# Patient Record
Sex: MALE | Race: BLACK OR AFRICAN AMERICAN | NOT HISPANIC OR LATINO | Employment: UNEMPLOYED | URBAN - METROPOLITAN AREA
[De-identification: names, ages, dates, MRNs, and addresses within clinical notes are randomized per-mention and may not be internally consistent; named-entity substitution may affect disease eponyms.]

---

## 2017-06-01 ENCOUNTER — HOSPITAL ENCOUNTER (INPATIENT)
Facility: HOSPITAL | Age: 32
LOS: 3 days | Discharge: HOME | End: 2017-06-04
Attending: EMERGENCY MEDICINE | Admitting: INTERNAL MEDICINE

## 2017-06-01 ENCOUNTER — APPOINTMENT (OUTPATIENT)
Dept: GENERAL RADIOLOGY | Facility: HOSPITAL | Age: 32
End: 2017-06-01

## 2017-06-01 DIAGNOSIS — J45.52 SEVERE PERSISTENT ASTHMA WITH STATUS ASTHMATICUS: Primary | ICD-10-CM

## 2017-06-01 LAB
ALBUMIN SERPL-MCNC: 4.2 G/DL (ref 3.5–5.2)
ALBUMIN/GLOB SERPL: 1.1 G/DL
ALP SERPL-CCNC: 61 U/L (ref 39–117)
ALT SERPL W P-5'-P-CCNC: 21 U/L (ref 1–41)
ANION GAP SERPL CALCULATED.3IONS-SCNC: 11.2 MMOL/L
ARTERIAL PATENCY WRIST A: POSITIVE
AST SERPL-CCNC: 22 U/L (ref 1–40)
ATMOSPHERIC PRESS: 750 MMHG
BASE EXCESS BLDA CALC-SCNC: 0.6 MMOL/L (ref 0–2)
BASOPHILS # BLD AUTO: 0.08 10*3/MM3 (ref 0–0.2)
BASOPHILS NFR BLD AUTO: 0.8 % (ref 0–1.5)
BDY SITE: ABNORMAL
BILIRUB SERPL-MCNC: 0.6 MG/DL (ref 0.1–1.2)
BUN BLD-MCNC: 10 MG/DL (ref 6–20)
BUN/CREAT SERPL: 8.6 (ref 7–25)
CALCIUM SPEC-SCNC: 9.2 MG/DL (ref 8.6–10.5)
CHLORIDE SERPL-SCNC: 98 MMOL/L (ref 98–107)
CO2 SERPL-SCNC: 27.8 MMOL/L (ref 22–29)
CREAT BLD-MCNC: 1.16 MG/DL (ref 0.76–1.27)
DEPRECATED RDW RBC AUTO: 45.3 FL (ref 37–54)
EOSINOPHIL # BLD AUTO: 1.84 10*3/MM3 (ref 0–0.7)
EOSINOPHIL NFR BLD AUTO: 17.6 % (ref 0.3–6.2)
ERYTHROCYTE [DISTWIDTH] IN BLOOD BY AUTOMATED COUNT: 14.1 % (ref 11.5–14.5)
GFR SERPL CREATININE-BSD FRML MDRD: 74 ML/MIN/1.73
GFR SERPL CREATININE-BSD FRML MDRD: 90 ML/MIN/1.73
GLOBULIN UR ELPH-MCNC: 3.9 GM/DL
GLUCOSE BLD-MCNC: 89 MG/DL (ref 65–99)
GLUCOSE BLDC GLUCOMTR-MCNC: 115 MG/DL (ref 70–130)
GLUCOSE BLDC GLUCOMTR-MCNC: 116 MG/DL (ref 70–130)
GLUCOSE BLDC GLUCOMTR-MCNC: 82 MG/DL (ref 70–130)
HCO3 BLDA-SCNC: 26.2 MMOL/L (ref 22–28)
HCT VFR BLD AUTO: 47.2 % (ref 40.4–52.2)
HGB BLD-MCNC: 16 G/DL (ref 13.7–17.6)
HOROWITZ INDEX BLD+IHG-RTO: 40 %
IMM GRANULOCYTES # BLD: 0.03 10*3/MM3 (ref 0–0.03)
IMM GRANULOCYTES NFR BLD: 0.3 % (ref 0–0.5)
INR PPP: 1.11 (ref 0.9–1.1)
LYMPHOCYTES # BLD AUTO: 3.73 10*3/MM3 (ref 0.9–4.8)
LYMPHOCYTES NFR BLD AUTO: 35.7 % (ref 19.6–45.3)
MCH RBC QN AUTO: 29.9 PG (ref 27–32.7)
MCHC RBC AUTO-ENTMCNC: 33.9 G/DL (ref 32.6–36.4)
MCV RBC AUTO: 88.2 FL (ref 79.8–96.2)
MODALITY: ABNORMAL
MONOCYTES # BLD AUTO: 1.38 10*3/MM3 (ref 0.2–1.2)
MONOCYTES NFR BLD AUTO: 13.2 % (ref 5–12)
NEUTROPHILS # BLD AUTO: 3.38 10*3/MM3 (ref 1.9–8.1)
NEUTROPHILS NFR BLD AUTO: 32.4 % (ref 42.7–76)
NT-PROBNP SERPL-MCNC: 29.7 PG/ML (ref 5–450)
O2 A-A PPRESDIFF RESPIRATORY: 0.5 MMHG
PCO2 BLDA: 44.1 MM HG (ref 35–45)
PH BLDA: 7.38 PH UNITS (ref 7.35–7.45)
PLATELET # BLD AUTO: 274 10*3/MM3 (ref 140–500)
PMV BLD AUTO: 11 FL (ref 6–12)
PO2 BLDA: 120.3 MM HG (ref 80–100)
POTASSIUM BLD-SCNC: 4.1 MMOL/L (ref 3.5–5.2)
PROT SERPL-MCNC: 8.1 G/DL (ref 6–8.5)
PROTHROMBIN TIME: 13.9 SECONDS (ref 11.7–14.2)
RBC # BLD AUTO: 5.35 10*6/MM3 (ref 4.6–6)
SAO2 % BLDCOA: 98.6 % (ref 92–99)
SET MECH RESP RATE: 14
SODIUM BLD-SCNC: 137 MMOL/L (ref 136–145)
TOTAL RATE: 15 BREATHS/MINUTE
TROPONIN T SERPL-MCNC: <0.01 NG/ML (ref 0–0.03)
WBC NRBC COR # BLD: 10.44 10*3/MM3 (ref 4.5–10.7)

## 2017-06-01 PROCEDURE — 85610 PROTHROMBIN TIME: CPT | Performed by: EMERGENCY MEDICINE

## 2017-06-01 PROCEDURE — 94660 CPAP INITIATION&MGMT: CPT

## 2017-06-01 PROCEDURE — 99285 EMERGENCY DEPT VISIT HI MDM: CPT

## 2017-06-01 PROCEDURE — 94799 UNLISTED PULMONARY SVC/PX: CPT

## 2017-06-01 PROCEDURE — 25010000002 ENOXAPARIN PER 10 MG: Performed by: INTERNAL MEDICINE

## 2017-06-01 PROCEDURE — 93010 ELECTROCARDIOGRAM REPORT: CPT | Performed by: INTERNAL MEDICINE

## 2017-06-01 PROCEDURE — 82803 BLOOD GASES ANY COMBINATION: CPT

## 2017-06-01 PROCEDURE — 93005 ELECTROCARDIOGRAM TRACING: CPT | Performed by: EMERGENCY MEDICINE

## 2017-06-01 PROCEDURE — 36600 WITHDRAWAL OF ARTERIAL BLOOD: CPT

## 2017-06-01 PROCEDURE — 80053 COMPREHEN METABOLIC PANEL: CPT | Performed by: EMERGENCY MEDICINE

## 2017-06-01 PROCEDURE — 36415 COLL VENOUS BLD VENIPUNCTURE: CPT

## 2017-06-01 PROCEDURE — 25010000002 METHYLPREDNISOLONE PER 125 MG: Performed by: INTERNAL MEDICINE

## 2017-06-01 PROCEDURE — 71010 HC CHEST PA OR AP: CPT

## 2017-06-01 PROCEDURE — 25010000002 METHYLPREDNISOLONE PER 125 MG: Performed by: EMERGENCY MEDICINE

## 2017-06-01 PROCEDURE — 83880 ASSAY OF NATRIURETIC PEPTIDE: CPT | Performed by: EMERGENCY MEDICINE

## 2017-06-01 PROCEDURE — 85025 COMPLETE CBC W/AUTO DIFF WBC: CPT | Performed by: EMERGENCY MEDICINE

## 2017-06-01 PROCEDURE — 82962 GLUCOSE BLOOD TEST: CPT

## 2017-06-01 PROCEDURE — 84484 ASSAY OF TROPONIN QUANT: CPT | Performed by: EMERGENCY MEDICINE

## 2017-06-01 PROCEDURE — 25010000002 MAGNESIUM SULFATE 2 GM/50ML SOLUTION: Performed by: EMERGENCY MEDICINE

## 2017-06-01 RX ORDER — IPRATROPIUM BROMIDE AND ALBUTEROL SULFATE 2.5; .5 MG/3ML; MG/3ML
3 SOLUTION RESPIRATORY (INHALATION) EVERY 6 HOURS PRN
Status: DISCONTINUED | OUTPATIENT
Start: 2017-06-01 | End: 2017-06-01

## 2017-06-01 RX ORDER — IPRATROPIUM BROMIDE AND ALBUTEROL SULFATE 2.5; .5 MG/3ML; MG/3ML
3 SOLUTION RESPIRATORY (INHALATION)
Status: DISCONTINUED | OUTPATIENT
Start: 2017-06-01 | End: 2017-06-04 | Stop reason: HOSPADM

## 2017-06-01 RX ORDER — ONDANSETRON 4 MG/1
4 TABLET, FILM COATED ORAL EVERY 6 HOURS PRN
Status: DISCONTINUED | OUTPATIENT
Start: 2017-06-01 | End: 2017-06-04 | Stop reason: HOSPADM

## 2017-06-01 RX ORDER — SODIUM CHLORIDE 0.9 % (FLUSH) 0.9 %
10 SYRINGE (ML) INJECTION AS NEEDED
Status: DISCONTINUED | OUTPATIENT
Start: 2017-06-01 | End: 2017-06-03

## 2017-06-01 RX ORDER — ALBUTEROL SULFATE 2.5 MG/3ML
2.5 SOLUTION RESPIRATORY (INHALATION) AS NEEDED
Status: DISCONTINUED | OUTPATIENT
Start: 2017-06-01 | End: 2017-06-01

## 2017-06-01 RX ORDER — BUDESONIDE 0.5 MG/2ML
0.5 INHALANT ORAL
Status: DISCONTINUED | OUTPATIENT
Start: 2017-06-01 | End: 2017-06-01

## 2017-06-01 RX ORDER — IPRATROPIUM BROMIDE AND ALBUTEROL SULFATE 2.5; .5 MG/3ML; MG/3ML
3 SOLUTION RESPIRATORY (INHALATION) EVERY 4 HOURS PRN
Status: DISCONTINUED | OUTPATIENT
Start: 2017-06-01 | End: 2017-06-04 | Stop reason: HOSPADM

## 2017-06-01 RX ORDER — ONDANSETRON 4 MG/1
4 TABLET, ORALLY DISINTEGRATING ORAL EVERY 6 HOURS PRN
Status: DISCONTINUED | OUTPATIENT
Start: 2017-06-01 | End: 2017-06-04 | Stop reason: HOSPADM

## 2017-06-01 RX ORDER — CALCIUM CARBONATE 200(500)MG
2 TABLET,CHEWABLE ORAL 2 TIMES DAILY PRN
Status: DISCONTINUED | OUTPATIENT
Start: 2017-06-01 | End: 2017-06-04 | Stop reason: HOSPADM

## 2017-06-01 RX ORDER — ACETAMINOPHEN 650 MG/1
650 SUPPOSITORY RECTAL EVERY 4 HOURS PRN
Status: DISCONTINUED | OUTPATIENT
Start: 2017-06-01 | End: 2017-06-04 | Stop reason: HOSPADM

## 2017-06-01 RX ORDER — ONDANSETRON 2 MG/ML
4 INJECTION INTRAMUSCULAR; INTRAVENOUS EVERY 6 HOURS PRN
Status: DISCONTINUED | OUTPATIENT
Start: 2017-06-01 | End: 2017-06-04 | Stop reason: HOSPADM

## 2017-06-01 RX ORDER — IPRATROPIUM BROMIDE AND ALBUTEROL SULFATE 2.5; .5 MG/3ML; MG/3ML
3 SOLUTION RESPIRATORY (INHALATION) ONCE
Status: COMPLETED | OUTPATIENT
Start: 2017-06-01 | End: 2017-06-01

## 2017-06-01 RX ORDER — ALBUTEROL SULFATE 2.5 MG/3ML
2.5 SOLUTION RESPIRATORY (INHALATION)
Status: COMPLETED | OUTPATIENT
Start: 2017-06-01 | End: 2017-06-01

## 2017-06-01 RX ORDER — SODIUM CHLORIDE 0.9 % (FLUSH) 0.9 %
1-10 SYRINGE (ML) INJECTION AS NEEDED
Status: DISCONTINUED | OUTPATIENT
Start: 2017-06-01 | End: 2017-06-04 | Stop reason: HOSPADM

## 2017-06-01 RX ORDER — METHYLPREDNISOLONE SODIUM SUCCINATE 125 MG/2ML
60 INJECTION, POWDER, LYOPHILIZED, FOR SOLUTION INTRAMUSCULAR; INTRAVENOUS EVERY 6 HOURS
Status: DISCONTINUED | OUTPATIENT
Start: 2017-06-01 | End: 2017-06-01

## 2017-06-01 RX ORDER — MAGNESIUM HYDROXIDE/ALUMINUM HYDROXICE/SIMETHICONE 120; 1200; 1200 MG/30ML; MG/30ML; MG/30ML
30 SUSPENSION ORAL EVERY 6 HOURS PRN
Status: DISCONTINUED | OUTPATIENT
Start: 2017-06-01 | End: 2017-06-04 | Stop reason: HOSPADM

## 2017-06-01 RX ORDER — METHYLPREDNISOLONE SODIUM SUCCINATE 125 MG/2ML
60 INJECTION, POWDER, LYOPHILIZED, FOR SOLUTION INTRAMUSCULAR; INTRAVENOUS EVERY 6 HOURS
Status: DISCONTINUED | OUTPATIENT
Start: 2017-06-01 | End: 2017-06-02

## 2017-06-01 RX ORDER — ALBUTEROL SULFATE 2.5 MG/3ML
2.5 SOLUTION RESPIRATORY (INHALATION) ONCE
Status: COMPLETED | OUTPATIENT
Start: 2017-06-01 | End: 2017-06-01

## 2017-06-01 RX ORDER — ALBUTEROL SULFATE 2.5 MG/3ML
SOLUTION RESPIRATORY (INHALATION)
Status: COMPLETED
Start: 2017-06-01 | End: 2017-06-01

## 2017-06-01 RX ORDER — BISACODYL 10 MG
10 SUPPOSITORY, RECTAL RECTAL DAILY PRN
Status: DISCONTINUED | OUTPATIENT
Start: 2017-06-01 | End: 2017-06-04 | Stop reason: HOSPADM

## 2017-06-01 RX ORDER — ACETAMINOPHEN 325 MG/1
650 TABLET ORAL EVERY 4 HOURS PRN
Status: DISCONTINUED | OUTPATIENT
Start: 2017-06-01 | End: 2017-06-04 | Stop reason: HOSPADM

## 2017-06-01 RX ORDER — MAGNESIUM SULFATE HEPTAHYDRATE 40 MG/ML
2 INJECTION, SOLUTION INTRAVENOUS ONCE
Status: COMPLETED | OUTPATIENT
Start: 2017-06-01 | End: 2017-06-01

## 2017-06-01 RX ORDER — LISINOPRIL 5 MG/1
5 TABLET ORAL DAILY
COMMUNITY
End: 2022-11-13

## 2017-06-01 RX ORDER — BISACODYL 5 MG/1
5 TABLET, DELAYED RELEASE ORAL DAILY PRN
Status: DISCONTINUED | OUTPATIENT
Start: 2017-06-01 | End: 2017-06-04 | Stop reason: HOSPADM

## 2017-06-01 RX ORDER — BUDESONIDE 0.5 MG/2ML
0.5 INHALANT ORAL
Status: DISCONTINUED | OUTPATIENT
Start: 2017-06-01 | End: 2017-06-04 | Stop reason: HOSPADM

## 2017-06-01 RX ORDER — BUDESONIDE AND FORMOTEROL FUMARATE DIHYDRATE 160; 4.5 UG/1; UG/1
2 AEROSOL RESPIRATORY (INHALATION)
COMMUNITY
End: 2018-07-31 | Stop reason: HOSPADM

## 2017-06-01 RX ORDER — METHYLPREDNISOLONE SODIUM SUCCINATE 125 MG/2ML
125 INJECTION, POWDER, LYOPHILIZED, FOR SOLUTION INTRAMUSCULAR; INTRAVENOUS ONCE
Status: COMPLETED | OUTPATIENT
Start: 2017-06-01 | End: 2017-06-01

## 2017-06-01 RX ADMIN — IPRATROPIUM BROMIDE AND ALBUTEROL SULFATE 3 ML: .5; 3 SOLUTION RESPIRATORY (INHALATION) at 19:53

## 2017-06-01 RX ADMIN — ALBUTEROL SULFATE 2.5 MG: 2.5 SOLUTION RESPIRATORY (INHALATION) at 13:23

## 2017-06-01 RX ADMIN — METHYLPREDNISOLONE SODIUM SUCCINATE 125 MG: 125 INJECTION, POWDER, FOR SOLUTION INTRAMUSCULAR; INTRAVENOUS at 13:21

## 2017-06-01 RX ADMIN — Medication 10 ML: at 18:27

## 2017-06-01 RX ADMIN — ALBUTEROL SULFATE 2.5 MG: 2.5 SOLUTION RESPIRATORY (INHALATION) at 14:08

## 2017-06-01 RX ADMIN — ACETAMINOPHEN 650 MG: 325 TABLET ORAL at 20:16

## 2017-06-01 RX ADMIN — IPRATROPIUM BROMIDE AND ALBUTEROL SULFATE 3 ML: .5; 3 SOLUTION RESPIRATORY (INHALATION) at 17:07

## 2017-06-01 RX ADMIN — IPRATROPIUM BROMIDE AND ALBUTEROL SULFATE 3 ML: .5; 3 SOLUTION RESPIRATORY (INHALATION) at 13:23

## 2017-06-01 RX ADMIN — MAGNESIUM SULFATE HEPTAHYDRATE 2 G: 40 INJECTION, SOLUTION INTRAVENOUS at 13:22

## 2017-06-01 RX ADMIN — ENOXAPARIN SODIUM 40 MG: 40 INJECTION SUBCUTANEOUS at 14:54

## 2017-06-01 RX ADMIN — BUDESONIDE 0.5 MG: 0.5 INHALANT RESPIRATORY (INHALATION) at 19:53

## 2017-06-01 RX ADMIN — ALBUTEROL SULFATE 2.5 MG: 2.5 SOLUTION RESPIRATORY (INHALATION) at 13:24

## 2017-06-01 RX ADMIN — ALBUTEROL SULFATE 2.5 MG: 2.5 SOLUTION RESPIRATORY (INHALATION) at 14:23

## 2017-06-01 RX ADMIN — ALBUTEROL SULFATE 2.5 MG: 2.5 SOLUTION RESPIRATORY (INHALATION) at 14:38

## 2017-06-01 RX ADMIN — METHYLPREDNISOLONE SODIUM SUCCINATE 60 MG: 125 INJECTION, POWDER, FOR SOLUTION INTRAMUSCULAR; INTRAVENOUS at 18:27

## 2017-06-01 NOTE — H&P
[place] [facility]  HISTORY AND PHYSICAL   [facility]        Patient Identification:  Name: Craig Bolanos  Age: 31 y.o.  Sex: male  :  1985  MRN: [id number]                     Primary Care Physician: No Known Provider    Chief Complaint:  Shortness of breath with wheezing    History of Present Illness:   31-year-old Afro-American male with history of asthma presents with increasing shortness of breath that started earlier today.  Saturations 88% on nonrebreather per EMS.  He's had previous hospitalizations at [facility] with intubation ×3 in the past.  He's been out of his medications in the last several days but didn't get them filled and has been using his inhaled medicines much more frequently last 24 hours without much benefit.  Denies productive cough.  Complains of chest tightness.  Started on BiPAP in the emergency room for increased work of breathing.  Received bronchodilators continuous IV steroids.  Will require admission to the intensive care unit for further treatment of status asthmaticus.  If worsens further will likely require intubation and mechanical ventilation again.  Shortness of breath with wheezing and cough is described as severe.  Cough is nonproductive.  Chest pain is a squeezing like without radiation.    Past Medical History:  Past Medical History:   Diagnosis Date   • Asthma    • COPD (chronic obstructive pulmonary disease)    • Hypertension      Past Surgical History:  No past surgical history on file.   Home Meds:    (Not in a hospital admission)    Allergies:  No Known Allergies  Immunizations:    There is no immunization history on file for this patient.  Social History:   Social History     Social History Narrative   • No narrative on file     Social History   Substance Use Topics   • Smoking status: Not on file   • Smokeless tobacco: Not on file   • Alcohol use Not on file     Family History:  No family history on file.     Review of Systems  Denies  "fevers or chills  Denies nausea or vomiting  No new vision or hearing changes  Positive chest tightness with shortness of breath and wheezing  No diarrhea, hematemesis or hematochezia, no dysuria or frequency  No musculoskeletal complaints  No heat or cold intolerance  No skin rashes  No dizziness or confusion.  No seizure activity  No new anxiety or depression  12 system review of systems performed and all else negative    Objective:  tMax 24 hrs: Temp (24hrs), Av.8 °F (37.1 °C), Min:98.8 °F (37.1 °C), Max:98.8 °F (37.1 °C)    Vitals Ranges:   Temp:  [98.8 °F (37.1 °C)] 98.8 °F (37.1 °C)  Heart Rate:  [123-140] 136  Resp:  [26-28] 28  BP: (129-149)/(100-109) 149/109      Exam:  BP (!) 149/109  Pulse (!) 136  Temp 98.8 °F (37.1 °C) (Tympanic)   Resp 28  Ht 68\" (172.7 cm)  Wt 140 lb (63.5 kg)  SpO2 95%  BMI 21.29 kg/m2    GENERAL APPEARANCE:   · Well developed  · Well nourished  · Moderate acute respiratory distress   EYES:    · PERRL                                                                           · Conjunctiva normal  · Sclera non-icteric.  HENT:   · Atraumatic, normocephalic  · External ears and nose normal  · Moist mucus membranes and no ulcers  NECK:  · Thyroid not enlarged  · Trachea midline   RESPIRATORY:    · labored breathing   · Diminished bilateral breath sounds  · No rales.  Diffuse bilateral wheezing  · No dullness  CARDIOVASCULAR:    · Tachycardic  · Normal S1, S2  · No murmur  · Lower extremity edema: none    GI:   · Bowel sounds normal  · Abdomen soft , non-distended, non-tender  · No abdominal masses.    MUSCULOSKELETAL:  · Normal movement of extremities  · No tenderness, no deformities  · No clubbing or cyanosis   Skin:    · No visible rashes  · No palpable nodules  PSYCHIATRIC:  · Speech and behavior appropriate  · Normal mood and affect  · Oriented to person, place and time  NEUROLOGIC:  .    Data Review:  Comprehensive Metabolic Panel [[id number]] Collected: 17 1318    "     Lab Status: Final result Specimen: Blood Updated: 06/01/17 1351        Glucose 89 mg/dL         BUN 10 mg/dL         Creatinine 1.16 mg/dL         Sodium 137 mmol/L         Potassium 4.1 mmol/L         Chloride 98 mmol/L         CO2 27.8 mmol/L         Calcium 9.2 mg/dL         Total Protein 8.1 g/dL         Albumin 4.20 g/dL         ALT (SGPT) 21 U/L         AST (SGOT) 22 U/L         Alkaline Phosphatase 61 U/L         Total Bilirubin 0.6 mg/dL         eGFR Non African Amer 74 mL/min/1.73         eGFR  African Amer 90 mL/min/1.73         Globulin 3.9 gm/dL         A/G Ratio 1.1 g/dL         BUN/Creatinine Ratio 8.6        Anion Gap 11.2 mmol/L        Protime-INR [[id number]] (Abnormal) Collected: 06/01/17 1318       Lab Status: Final result Specimen: Blood Updated: 06/01/17 1341        Protime 13.9 Seconds         INR 1.11 (H)       Troponin [[id number]] (Normal) Collected: 06/01/17 1318       Lab Status: Final result Specimen: Blood Updated: 06/01/17 1351        Troponin T <0.010 ng/mL        Narrative:         Troponin T Reference Ranges:  Less than 0.03 ng/mL:    Negative for AMI  0.03 to 0.09 ng/mL:      Indeterminant for AMI  Greater than 0.09 ng/mL: Positive for AMI       BNP [[id number]] (Normal) Collected: 06/01/17 1318       Lab Status: Final result Specimen: Blood Updated: 06/01/17 1349        proBNP 29.7 pg/mL        Narrative:         Among patients with dyspnea, NT-proBNP is highly sensitive for the detection of acute congestive heart failure. In addition NT-proBNP of <300 pg/ml effectively rules out acute congestive heart failure with 99% negative predictive value.       CBC Auto Differential [[id number]] (Abnormal) Collected: 06/01/17 1318       Lab Status: Final result Specimen: Blood Updated: 06/01/17 1332        WBC 10.44 10*3/mm3         RBC 5.35 10*6/mm3         Hemoglobin 16.0 g/dL         Hematocrit 47.2 %         MCV 88.2 fL         MCH 29.9 pg         MCHC 33.9 g/dL         RDW 14.1 %          RDW-SD 45.3 fl         MPV 11.0 fL         Platelets 274 10*3/mm3         Neutrophil % 32.4 (L) %         Lymphocyte % 35.7 %         Monocyte % 13.2 (H) %         Eosinophil % 17.6 (H) %         Basophil % 0.8 %         Immature Grans % 0.3 %         Neutrophils, Absolute 3.38 10*3/mm3         Lymphocytes, Absolute 3.73 10*3/mm3         Monocytes, Absolute 1.38 (H) 10*3/mm3         Eosinophils, Absolute 1.84 (H) 10*3/mm3         Basophils, Absolute 0.08 10*3/mm3         Immature Grans, Absolute 0.03 10*3/mm3               chest X-ray: No acute disease    Assessment:  Status asthmaticus  Acute hypoxemic respiratory failure secondary to above  History hypertension    Plan:  We'll require admission to the intensive care unit.  Continue scheduled bronchodilators.  Continue inhaled and IV steroids.  If worsens will require intubation.  On BiPAP for increased work of breathing.  Stat ABG has been ordered but results are not back yet.  ICU core measures    Cristian Andrea MD  [facility], [facility]  Pulmonary and Critical Care Medicine    6/1/2017  3:12 PM

## 2017-06-01 NOTE — ED PROVIDER NOTES
EMERGENCY DEPARTMENT ENCOUNTER    CHIEF COMPLAINT  Chief Complaint: SOA  History given by: patient, EMS  History limited by: pt cannot speak due to severity of SOA  Room Number: 12/12  PMD: No Known Provider      HPI:  Pt is a 31 y.o. male w/ hx of asthma who presents complaining of SOA onset earlier today. Per EMS pt was 88% on non-rebreather PTA post MN x 1. Pt denies any other complaints, medical problems or hx. Pt has been intubated previously and admitted to ICU x3. Pt is prescribed medications for hypertension but has not taken them recently.      Duration:  Onset earlier today  Onset: gradual  Timing: constant  Location: chest  Radiation: n/a  Quality: difficulty breathing  Intensity/Severity: severe  Progression: worsening  Associated Symptoms: none  Aggravating Factors: none  Alleviating Factors: none  Previous Episodes: hx of asthma  Treatment before arrival: multiple albuterol including 1 per EMS    PAST MEDICAL HISTORY  Active Ambulatory Problems     Diagnosis Date Noted   • No Active Ambulatory Problems     Resolved Ambulatory Problems     Diagnosis Date Noted   • No Resolved Ambulatory Problems     Past Medical History:   Diagnosis Date   • Asthma    • COPD (chronic obstructive pulmonary disease)    • Hypertension        PAST SURGICAL HISTORY  No past surgical history on file.    FAMILY HISTORY  No family history on file.    SOCIAL HISTORY  Social History     Social History   • Marital status: Single     Spouse name: N/A   • Number of children: N/A   • Years of education: N/A     Occupational History   • Not on file.     Social History Main Topics   • Smoking status: Not on file   • Smokeless tobacco: Not on file   • Alcohol use Not on file   • Drug use: Not on file   • Sexual activity: Not on file     Other Topics Concern   • Not on file     Social History Narrative   • No narrative on file       ALLERGIES  Review of patient's allergies indicates no known allergies.    REVIEW OF SYSTEMS  Review of  Systems   Unable to perform ROS: Acuity of condition   Respiratory: Positive for shortness of breath.        PHYSICAL EXAM  ED Triage Vitals   Temp Heart Rate Resp BP SpO2   -- 06/01/17 1315 06/01/17 1315 -- 06/01/17 1316    123 26  100 %      Temp src Heart Rate Source Patient Position BP Location FiO2 (%)   -- 06/01/17 1315 -- -- --    Monitor          Physical Exam   Constitutional: He is oriented to person, place, and time. He appears distressed (severely).   HENT:   Head: Normocephalic and atraumatic.   Eyes: EOM are normal.   Neck: Normal range of motion.   Cardiovascular: Regular rhythm, normal heart sounds and intact distal pulses.  Tachycardia present.    No murmur heard.  Pulses:       Posterior tibial pulses are 2+ on the right side, and 2+ on the left side.   Pulmonary/Chest: Accessory muscle usage present. Tachypnea noted. He is in respiratory distress (severe). He has decreased breath sounds. He has wheezes. He has rhonchi. He has no rales.   Poor air movement heard bilaterally.    Abdominal: Soft. Bowel sounds are normal. There is no tenderness.   Musculoskeletal: Normal range of motion. He exhibits no edema.   Neurological: He is alert and oriented to person, place, and time.   Skin: Skin is warm and dry.   Psychiatric: Affect normal.   Nursing note and vitals reviewed.      LAB RESULTS  Lab Results (last 24 hours)     Procedure Component Value Units Date/Time    Comprehensive Metabolic Panel [[id number]] Collected:  06/01/17 1318    Specimen:  Blood Updated:  06/01/17 1351     Glucose 89 mg/dL      BUN 10 mg/dL      Creatinine 1.16 mg/dL      Sodium 137 mmol/L      Potassium 4.1 mmol/L      Chloride 98 mmol/L      CO2 27.8 mmol/L      Calcium 9.2 mg/dL      Total Protein 8.1 g/dL      Albumin 4.20 g/dL      ALT (SGPT) 21 U/L      AST (SGOT) 22 U/L      Alkaline Phosphatase 61 U/L      Total Bilirubin 0.6 mg/dL      eGFR Non African Amer 74 mL/min/1.73      eGFR  African Amer 90 mL/min/1.73       Globulin 3.9 gm/dL      A/G Ratio 1.1 g/dL      BUN/Creatinine Ratio 8.6     Anion Gap 11.2 mmol/L     CBC & Differential [[id number]] Collected:  06/01/17 1318    Specimen:  Blood Updated:  06/01/17 1332    Narrative:       The following orders were created for panel order CBC & Differential.  Procedure                               Abnormality         Status                     ---------                               -----------         ------                     CBC Auto Differential[[id number]]        Abnormal            Final result                 Please view results for these tests on the individual orders.    Protime-INR [[id number]]  (Abnormal) Collected:  06/01/17 1318    Specimen:  Blood Updated:  06/01/17 1341     Protime 13.9 Seconds      INR 1.11 (H)    Troponin [[id number]]  (Normal) Collected:  06/01/17 1318    Specimen:  Blood Updated:  06/01/17 1351     Troponin T <0.010 ng/mL     Narrative:       Troponin T Reference Ranges:  Less than 0.03 ng/mL:    Negative for AMI  0.03 to 0.09 ng/mL:      Indeterminant for AMI  Greater than 0.09 ng/mL: Positive for AMI    BNP [[id number]]  (Normal) Collected:  06/01/17 1318    Specimen:  Blood Updated:  06/01/17 1349     proBNP 29.7 pg/mL     Narrative:       Among patients with dyspnea, NT-proBNP is highly sensitive for the detection of acute congestive heart failure. In addition NT-proBNP of <300 pg/ml effectively rules out acute congestive heart failure with 99% negative predictive value.    CBC Auto Differential [[id number]]  (Abnormal) Collected:  06/01/17 1318    Specimen:  Blood Updated:  06/01/17 1332     WBC 10.44 10*3/mm3      RBC 5.35 10*6/mm3      Hemoglobin 16.0 g/dL      Hematocrit 47.2 %      MCV 88.2 fL      MCH 29.9 pg      MCHC 33.9 g/dL      RDW 14.1 %      RDW-SD 45.3 fl      MPV 11.0 fL      Platelets 274 10*3/mm3      Neutrophil % 32.4 (L) %      Lymphocyte % 35.7 %      Monocyte % 13.2 (H) %      Eosinophil % 17.6 (H) %      Basophil % 0.8 %       Immature Grans % 0.3 %      Neutrophils, Absolute 3.38 10*3/mm3      Lymphocytes, Absolute 3.73 10*3/mm3      Monocytes, Absolute 1.38 (H) 10*3/mm3      Eosinophils, Absolute 1.84 (H) 10*3/mm3      Basophils, Absolute 0.08 10*3/mm3      Immature Grans, Absolute 0.03 10*3/mm3         I ordered the above labs and reviewed the results      RADIOLOGY  XR Chest 1 View   Final Result   The lungs are well-expanded and clear and the heart and hilar structures  are normal. There is no acute disease.     I ordered the above noted radiological studies. Interpreted by radiologist. Reviewed by me in PACS.       PROCEDURES  Critical Care  Performed by: TEVIN WONG  Authorized by: TEVIN WONG   Total critical care time: 35 minutes  Critical care time was exclusive of separately billable procedures and treating other patients.  Critical care was necessary to treat or prevent imminent or life-threatening deterioration of the following conditions: respiratory failure.  Critical care was time spent personally by me on the following activities: discussions with consultants, evaluation of patient's response to treatment, obtaining history from patient or surrogate, ordering and review of laboratory studies, pulse oximetry, review of old charts, development of treatment plan with patient or surrogate, examination of patient, ordering and performing treatments and interventions, ordering and review of radiographic studies and re-evaluation of patient's condition.      EKG           EKG time: 1335  Rhythm/Rate: Sinus tach rate in 130s  P waves and SC: normal  QRS, axis: normal   ST and T waves: normal     Interpreted Contemporaneously by me, independently viewed  No old for comparison      PROGRESS AND CONSULTS  ED Course     1309  Ordered blood work, PT-INR, troponin, BNP for further evaluation.    1312  Pt is 98% on non-rebreather.    1314  Ordered Duo-Neb, albuterol, Solu-medrol for SOA.    1317  No pneumothorax seen on  CXR.    1337  Rechecked pt who is more comfortable and able to speak a few sentences. Pt states he feels that he needs to cough but is unable to and that he has family en route. Pt understands and agrees with the plan. All questions answered.    1401  Rechecked pt who is c/o increased SOA. D/w pt plan to give more breathing tx and admit pt to ICU. Pt able to speak in short sentences and requests MD to call his mother. Called pt's mother who states family can be at the hospital w/in the next 30 minutes.   O2 96 % non-rebreather, HR 130s, /109  Ordered repeat albuterol. Placed call to admit pt to CCU.    1412  Call returned from Dr. Andrea, pulmonology, who will admit pt to CCU and recommends putting pt on BiPAP.  Rechecked pt who states he has not had a BiPAP before. D/w pt plan to BiPAP pt. Pt understands and agrees with the plan. All questions answered.  Ordered BiPAP for SOA.    1431  BP: 129/100 HR: (!) 136 Temp: 98.8 °F (37.1 °C) (Tympanic) O2 sat: 95%  Rechecked pt who appears to be more comfortable. Spoke w/ RT who are on the way w/ BiPAP.    1502  Rechecked pt who appears more comfortable on BiPAP.  BP: (!) 132/96  HR: (!) 120 Temp: 98.8 °F (37.1 °C) (Tympanic) O2 sat: 95%      MEDICAL DECISION MAKING  Results were reviewed/discussed with the patient and they were also made aware of online access. Pt also made aware that some labs, such as cultures, will not be resulted during ER visit and follow up with PMD is necessary.     MDM  Number of Diagnoses or Management Options  Severe persistent asthma with status asthmaticus:      Amount and/or Complexity of Data Reviewed  Clinical lab tests: ordered and reviewed (Lab work is unremarkable.)  Tests in the radiology section of CPT®: ordered and reviewed (CXR is negative acute.)  Tests in the medicine section of CPT®: reviewed and ordered (See EKG procedure note.)  Decide to obtain previous medical records or to obtain history from someone other than the  patient: yes  Obtain history from someone other than the patient: yes  Review and summarize past medical records: yes  Discuss the patient with other providers: yes (Dr. Andrea, pulmonology)  Independent visualization of images, tracings, or specimens: yes    Critical Care  Total time providing critical care: 30-74 minutes    Patient Progress  Patient progress: stable         DIAGNOSIS  Final diagnoses:   Severe persistent asthma with status asthmaticus       DISPOSITION  ADMISSION TO CCU    Discussed treatment plan and reason for admission with pt/family and admitting physician.  Pt/family voiced understanding of the plan for admission for further testing/treatment as needed.       Latest Documented Vital Signs:  As of 3:20 PM  BP- 132/96 HR- 86 Temp- 98.8 °F (37.1 °C) (Tympanic) O2 sat- 95%    --  Documentation assistance provided by mike Walker for Dr. Oconnell.  Information recorded by the scribe was done at my direction and has been verified and validated by me.     Abdulaziz Walker  06/01/17 1520       Sofia Oconnell MD  06/02/17 0103

## 2017-06-01 NOTE — PLAN OF CARE
Problem: Patient Care Overview (Adult)  Goal: Plan of Care Review  Outcome: Ongoing (interventions implemented as appropriate)    06/01/17 1646   Coping/Psychosocial Response Interventions   Plan Of Care Reviewed With patient;mother   Patient Care Overview   Progress improving   Outcome Evaluation   Outcome Summary/Follow up Plan Admitted from ER for asthma exacerbation. Patient tolerated transfer on 2 liters NC, however patient placed back on bipap d/t c/o increased SOA and chest tightness. Symptoms improved once placed back on Bipap. No c/o pain. VSS. No skin issues. NPO until tolerate BiPAP being off for extended period of time. Continue to monitor.       Goal: Adult Individualization and Mutuality  Outcome: Ongoing (interventions implemented as appropriate)  Goal: Discharge Needs Assessment  Outcome: Ongoing (interventions implemented as appropriate)    Problem: Activity Intolerance (Adult)  Goal: Identify Related Risk Factors and Signs and Symptoms  Outcome: Outcome(s) achieved Date Met:  06/01/17 06/01/17 1646   Activity Intolerance   Activity Intolerance: Related Risk Factors impaired pulmonary function;O2 supply/demand imbalance   Signs and Symptoms (Activity Intolerance) dyspnea/shortness of breath       Goal: Activity Tolerance  Outcome: Ongoing (interventions implemented as appropriate)    06/01/17 1646   Activity Intolerance (Adult)   Activity Tolerance making progress toward outcome       Goal: Effective Energy Conservation Techniques  Outcome: Ongoing (interventions implemented as appropriate)    06/01/17 1646   Activity Intolerance (Adult)   Effective Energy Conservation Techniques making progress toward outcome         Problem: Anxiety (Adult)  Goal: Identify Related Risk Factors and Signs and Symptoms  Outcome: Outcome(s) achieved Date Met:  06/01/17 06/01/17 1646   Anxiety   Related Risk Factors (Anxiety) perceived threat   Signs and Symptoms (Anxiety) perceived dangers/fears;sense of  impending doom       Goal: Reduction/Resolution  Outcome: Ongoing (interventions implemented as appropriate)    06/01/17 1646   Anxiety (Adult)   Reduction/Resolution making progress toward outcome         Problem: Respiratory Insufficiency (Adult)  Goal: Identify Related Risk Factors and Signs and Symptoms  Outcome: Outcome(s) achieved Date Met:  06/01/17 06/01/17 1646   Respiratory Insufficiency   Related Risk Factors (Respiratory Insufficiency) activity intolerance;other (see comments);physiological factors  (asthma)   Signs and Symptoms (Respiratory Insufficiency) abnormal breath sounds;blood pressure/heart rate changes;breathing pattern changes;decreased oxygen saturation;dyspnea;shortness of breath;respiratory rate alterations;use of accessory muscles       Goal: Acid/Base Balance  Outcome: Ongoing (interventions implemented as appropriate)    06/01/17 1646   Respiratory Insufficiency (Adult)   Acid/Base Balance making progress toward outcome       Goal: Effective Ventilation  Outcome: Ongoing (interventions implemented as appropriate)    06/01/17 1646   Respiratory Insufficiency (Adult)   Effective Ventilation making progress toward outcome

## 2017-06-02 LAB
ARTERIAL PATENCY WRIST A: POSITIVE
ATMOSPHERIC PRESS: 751 MMHG
BASE EXCESS BLDA CALC-SCNC: 2.3 MMOL/L (ref 0–2)
BDY SITE: ABNORMAL
GAS FLOW AIRWAY: 3 LPM
GLUCOSE BLDC GLUCOMTR-MCNC: 109 MG/DL (ref 70–130)
GLUCOSE BLDC GLUCOMTR-MCNC: 123 MG/DL (ref 70–130)
GLUCOSE BLDC GLUCOMTR-MCNC: 132 MG/DL (ref 70–130)
GLUCOSE BLDC GLUCOMTR-MCNC: 140 MG/DL (ref 70–130)
HCO3 BLDA-SCNC: 27.4 MMOL/L (ref 22–28)
MODALITY: ABNORMAL
PCO2 BLDA: 42.8 MM HG (ref 35–45)
PH BLDA: 7.41 PH UNITS (ref 7.35–7.45)
PO2 BLDA: 95.6 MM HG (ref 80–100)
SAO2 % BLDCOA: 97.5 % (ref 92–99)
TOTAL RATE: 20 BREATHS/MINUTE

## 2017-06-02 PROCEDURE — 25010000002 ENOXAPARIN PER 10 MG: Performed by: INTERNAL MEDICINE

## 2017-06-02 PROCEDURE — 36600 WITHDRAWAL OF ARTERIAL BLOOD: CPT

## 2017-06-02 PROCEDURE — 82962 GLUCOSE BLOOD TEST: CPT

## 2017-06-02 PROCEDURE — 94799 UNLISTED PULMONARY SVC/PX: CPT

## 2017-06-02 PROCEDURE — 25010000002 METHYLPREDNISOLONE PER 125 MG: Performed by: INTERNAL MEDICINE

## 2017-06-02 PROCEDURE — 82803 BLOOD GASES ANY COMBINATION: CPT

## 2017-06-02 RX ORDER — LISINOPRIL 5 MG/1
5 TABLET ORAL
Status: DISCONTINUED | OUTPATIENT
Start: 2017-06-02 | End: 2017-06-04 | Stop reason: HOSPADM

## 2017-06-02 RX ORDER — METHYLPREDNISOLONE SODIUM SUCCINATE 125 MG/2ML
60 INJECTION, POWDER, LYOPHILIZED, FOR SOLUTION INTRAMUSCULAR; INTRAVENOUS EVERY 12 HOURS
Status: DISCONTINUED | OUTPATIENT
Start: 2017-06-03 | End: 2017-06-03

## 2017-06-02 RX ADMIN — IPRATROPIUM BROMIDE AND ALBUTEROL SULFATE 3 ML: .5; 3 SOLUTION RESPIRATORY (INHALATION) at 07:53

## 2017-06-02 RX ADMIN — IPRATROPIUM BROMIDE AND ALBUTEROL SULFATE 3 ML: .5; 3 SOLUTION RESPIRATORY (INHALATION) at 23:18

## 2017-06-02 RX ADMIN — ENOXAPARIN SODIUM 40 MG: 40 INJECTION SUBCUTANEOUS at 08:27

## 2017-06-02 RX ADMIN — METHYLPREDNISOLONE SODIUM SUCCINATE 60 MG: 125 INJECTION, POWDER, FOR SOLUTION INTRAMUSCULAR; INTRAVENOUS at 23:42

## 2017-06-02 RX ADMIN — ACETAMINOPHEN 650 MG: 325 TABLET ORAL at 12:01

## 2017-06-02 RX ADMIN — METHYLPREDNISOLONE SODIUM SUCCINATE 60 MG: 125 INJECTION, POWDER, FOR SOLUTION INTRAMUSCULAR; INTRAVENOUS at 06:45

## 2017-06-02 RX ADMIN — IPRATROPIUM BROMIDE AND ALBUTEROL SULFATE 3 ML: .5; 3 SOLUTION RESPIRATORY (INHALATION) at 12:21

## 2017-06-02 RX ADMIN — LISINOPRIL 5 MG: 5 TABLET ORAL at 11:57

## 2017-06-02 RX ADMIN — BUDESONIDE 0.5 MG: 0.5 INHALANT RESPIRATORY (INHALATION) at 07:53

## 2017-06-02 RX ADMIN — IPRATROPIUM BROMIDE AND ALBUTEROL SULFATE 3 ML: .5; 3 SOLUTION RESPIRATORY (INHALATION) at 15:52

## 2017-06-02 RX ADMIN — METHYLPREDNISOLONE SODIUM SUCCINATE 60 MG: 125 INJECTION, POWDER, FOR SOLUTION INTRAMUSCULAR; INTRAVENOUS at 01:16

## 2017-06-02 RX ADMIN — IPRATROPIUM BROMIDE AND ALBUTEROL SULFATE 3 ML: .5; 3 SOLUTION RESPIRATORY (INHALATION) at 04:23

## 2017-06-02 RX ADMIN — BUDESONIDE 0.5 MG: 0.5 INHALANT RESPIRATORY (INHALATION) at 19:41

## 2017-06-02 RX ADMIN — IPRATROPIUM BROMIDE AND ALBUTEROL SULFATE 3 ML: .5; 3 SOLUTION RESPIRATORY (INHALATION) at 00:07

## 2017-06-02 RX ADMIN — IPRATROPIUM BROMIDE AND ALBUTEROL SULFATE 3 ML: .5; 3 SOLUTION RESPIRATORY (INHALATION) at 19:41

## 2017-06-02 RX ADMIN — METHYLPREDNISOLONE SODIUM SUCCINATE 60 MG: 125 INJECTION, POWDER, FOR SOLUTION INTRAMUSCULAR; INTRAVENOUS at 12:01

## 2017-06-02 NOTE — PAYOR COMM NOTE
"Alex Bolanos (31 y.o. Male)              ATTENTION; CRISSY GARZONS FOR YOUR REVIEW, PATIENT ADMITTED TO CORONARY CARE UNIT.          REPLY TO UR DEPT, BY  [phone]       Date of Birth Social Security Number Address Home Phone MRN    1985  [id number] [facility] [address] [phone] [id number]    [employer] Marital Status          None Single       Admission Date Admission Type Admitting Provider Attending Provider Department, Room/Bed    17 Emergency Cristian Andrea MD Esterle, Cristian Herron MD [facility] CORONARY CARE, /    Discharge Date Discharge Disposition Discharge Destination                      Attending Provider: Cristian Andrea MD     Allergies:  No Known Allergies    Isolation:  None   Infection:  None   Code Status:  FULL    Ht:  69\" (175.3 cm)   Wt:  135 lb 9.6 oz (61.5 kg)    Admission Cmt:  None   Principal Problem:  None                Active Insurance as of 2017     Primary Coverage     Payor Plan Insurance Group Employer/Plan Group    PASSPORT PASSPORT      Payor Plan Address Payor Plan Phone Number Effective From Effective To    [address] [phone] 1997     [place] [address]       Subscriber Name Subscriber Birth Date Member ID       ALEX BOLANOS 1985 [id number]                 Emergency Contacts      (Rel.) Home Phone Work Phone Mobile Phone    Ann Bolanos (Mother) [phone] -- --               History & Physical      Cristian Andrea MD at 2017  3:12 PM              [facility] PULMONARY C[facility]  HISTORY AND PHYSICAL   [facility]        Patient Identification:  Name: Alex Bolanos  Age: 31 y.o.  Sex: male  :  1985  MRN: [id number]                     Primary Care Physician: No Known Provider    Chief Complaint:  Shortness of breath with wheezing    History of Present Illness:   31-year-old Afro-American male with history of asthma presents with increasing " shortness of breath that started earlier today.  Saturations 88% on nonrebreather per EMS.  He's had previous hospitalizations at [facility] with intubation ×3 in the past.  He's been out of his medications in the last several days but didn't get them filled and has been using his inhaled medicines much more frequently last 24 hours without much benefit.  Denies productive cough.  Complains of chest tightness.  Started on BiPAP in the emergency room for increased work of breathing.  Received bronchodilators continuous IV steroids.  Will require admission to the intensive care unit for further treatment of status asthmaticus.  If worsens further will likely require intubation and mechanical ventilation again.  Shortness of breath with wheezing and cough is described as severe.  Cough is nonproductive.  Chest pain is a squeezing like without radiation.    Past Medical History:  Past Medical History:   Diagnosis Date   • Asthma    • COPD (chronic obstructive pulmonary disease)    • Hypertension      Past Surgical History:  No past surgical history on file.   Home Meds:    (Not in a hospital admission)    Allergies:  No Known Allergies  Immunizations:  Family History:  No family history on file.     Review of Systems  Denies fevers or chills  Denies nausea or vomiting  No new vision or hearing changes  Positive chest tightness with shortness of breath and wheezing  No diarrhea, hematemesis or hematochezia, no dysuria or frequency  No musculoskeletal complaints  No heat or cold intolerance  No skin rashes  No dizziness or confusion.  No seizure activity  No new anxiety or depression  12 system review of systems performed and all else negative    Objective:  tMax 24 hrs: Temp (24hrs), Av.8 °F (37.1 °C), Min:98.8 °F (37.1 °C), Max:98.8 °F (37.1 °C)    Vitals Ranges:   Temp:  [98.8 °F (37.1 °C)] 98.8 °F (37.1 °C)  Heart Rate:  [123-140] 136  Resp:  [26-28] 28  BP: (129-149)/(100-109) 149/109      Exam:  BP (!) 149/109   "Pulse (!) 136  Temp 98.8 °F (37.1 °C) (Tympanic)   Resp 28  Ht 68\" (172.7 cm)  Wt 140 lb (63.5 kg)  SpO2 95%  BMI 21.29 kg/m2    GENERAL APPEARANCE:   · Well developed  · Well nourished  · Moderate acute respiratory distress   EYES:    · PERRL                                                                           · Conjunctiva normal  · Sclera non-icteric.  HENT:   · Atraumatic, normocephalic  · External ears and nose normal  · Moist mucus membranes and no ulcers  NECK:  · Thyroid not enlarged  · Trachea midline   RESPIRATORY:    · labored breathing   · Diminished bilateral breath sounds  · No rales.  Diffuse bilateral wheezing  · No dullness  CARDIOVASCULAR:    · Tachycardic  · Normal S1, S2  · No murmur  · Lower extremity edema: none    GI:   · Bowel sounds normal  · Abdomen soft , non-distended, non-tender  · No abdominal masses.    MUSCULOSKELETAL:  · Normal movement of extremities  · No tenderness, no deformities  · No clubbing or cyanosis   Skin:    · No visible rashes  · No palpable nodules  PSYCHIATRIC:  · Speech and behavior appropriate  · Normal mood and affect  · Oriented to person, place and time  NEUROLOGIC:  .    Data Review:  Comprehensive Metabolic Panel [[id number]] Collected: 06/01/17 1318        Lab Status: Final result Specimen: Blood Updated: 06/01/17 1351        Glucose 89 mg/dL         BUN 10 mg/dL         Creatinine 1.16 mg/dL         Sodium 137 mmol/L         Potassium 4.1 mmol/L         Chloride 98 mmol/L         CO2 27.8 mmol/L         Calcium 9.2 mg/dL         Total Protein 8.1 g/dL         Albumin 4.20 g/dL         ALT (SGPT) 21 U/L         AST (SGOT) 22 U/L         Alkaline Phosphatase 61 U/L         Total Bilirubin 0.6 mg/dL         eGFR Non African Amer 74 mL/min/1.73         eGFR  African Amer 90 mL/min/1.73         Globulin 3.9 gm/dL         A/G Ratio 1.1 g/dL         BUN/Creatinine Ratio 8.6        Anion Gap 11.2 mmol/L        Protime-INR [[id number]] (Abnormal) Collected: " 06/01/17 1318       Lab Status: Final result Specimen: Blood Updated: 06/01/17 1341        Protime 13.9 Seconds         INR 1.11 (H)       Troponin [[id number]] (Normal) Collected: 06/01/17 1318       Lab Status: Final result Specimen: Blood Updated: 06/01/17 1351        Troponin T <0.010 ng/mL        Narrative:         Troponin T Reference Ranges:  Less than 0.03 ng/mL:    Negative for AMI  0.03 to 0.09 ng/mL:      Indeterminant for AMI  Greater than 0.09 ng/mL: Positive for AMI       BNP [[id number]] (Normal) Collected: 06/01/17 1318       Lab Status: Final result Specimen: Blood Updated: 06/01/17 1349        proBNP 29.7 pg/mL        Narrative:         Among patients with dyspnea, NT-proBNP is highly sensitive for the detection of acute congestive heart failure. In addition NT-proBNP of <300 pg/ml effectively rules out acute congestive heart failure with 99% negative predictive value.       CBC Auto Differential [[id number]] (Abnormal) Collected: 06/01/17 1318       Lab Status: Final result Specimen: Blood Updated: 06/01/17 1332        WBC 10.44 10*3/mm3         RBC 5.35 10*6/mm3         Hemoglobin 16.0 g/dL         Hematocrit 47.2 %         MCV 88.2 fL         MCH 29.9 pg         MCHC 33.9 g/dL         RDW 14.1 %         RDW-SD 45.3 fl         MPV 11.0 fL         Platelets 274 10*3/mm3         Neutrophil % 32.4 (L) %         Lymphocyte % 35.7 %         Monocyte % 13.2 (H) %         Eosinophil % 17.6 (H) %         Basophil % 0.8 %         Immature Grans % 0.3 %         Neutrophils, Absolute 3.38 10*3/mm3         Lymphocytes, Absolute 3.73 10*3/mm3         Monocytes, Absolute 1.38 (H) 10*3/mm3         Eosinophils, Absolute 1.84 (H) 10*3/mm3         Basophils, Absolute 0.08 10*3/mm3         Immature Grans, Absolute 0.03 10*3/mm3               chest X-ray: No acute disease    Assessment:  Status asthmaticus  Acute hypoxemic respiratory failure secondary to above  History hypertension    Plan:  We'll require admission to  the intensive care unit.  Continue scheduled bronchodilators.  Continue inhaled and IV steroids.  If worsens will require intubation.  On BiPAP for increased work of breathing.  Stat ABG has been ordered but results are not back yet.  ICU core measures    Cristian Andrea MD  [facility], [facility]  Pulmonary and Critical Care Medicine    6/1/2017  3:12 PM     Electronically signed by Cristian Andrea MD at 6/1/2017  3:17 PM           Emergency Department Notes      Sofia Oconnell MD at 6/1/2017  1:10 PM      Procedure Orders:    1. Critical Care [[id number]] ordered by Sofia Oconnell MD at 06/01/17 1406                 EMERGENCY DEPARTMENT ENCOUNTER    CHIEF COMPLAINT  Chief Complaint: SOA  History given by: patient, EMS  History limited by: pt cannot speak due to severity of SOA  Room Number: 12/12  PMD: No Known Provider      HPI:  Pt is a 31 y.o. male w/ hx of asthma who presents complaining of SOA onset earlier today. Per EMS pt was 88% on non-rebreather PTA post MN x 1. Pt denies any other complaints, medical problems or hx. Pt has been intubated previously and admitted to ICU x3. Pt is prescribed medications for hypertension but has not taken them recently.      Duration:  Onset earlier today  Onset: gradual  Timing: constant  Location: chest  Radiation: n/a  Quality: difficulty breathing  Intensity/Severity: severe  Progression: worsening  Associated Symptoms: none  Aggravating Factors: none  Alleviating Factors: none  Previous Episodes: hx of asthma  Treatment before arrival: multiple albuterol including 1 per EMS    PAST MEDICAL HISTORY  Active Ambulatory Problems     Diagnosis Date Noted   • No Active Ambulatory Problems     Resolved Ambulatory Problems     Diagnosis Date Noted   • No Resolved Ambulatory Problems     Past Medical History:   Diagnosis Date   • Asthma    • COPD (chronic obstructive pulmonary disease)    • Hypertension        PAST SURGICAL HISTORY  No past surgical history on  file.    FAMILY HISTORY  No family history on file.    SOCIAL HISTORY  Social History     Social History   • Marital status: Single     Spouse name: N/A   • Number of children: N/A   • Years of education: N/A     Occupational History   • Not on file.     Social History Main Topics   • Smoking status: Not on file   • Smokeless tobacco: Not on file   • Alcohol use Not on file   • Drug use: Not on file   • Sexual activity: Not on file     Other Topics Concern   • Not on file     Social History Narrative   • No narrative on file       ALLERGIES  Review of patient's allergies indicates no known allergies.    REVIEW OF SYSTEMS  Review of Systems   Unable to perform ROS: Acuity of condition   Respiratory: Positive for shortness of breath.        PHYSICAL EXAM  ED Triage Vitals   Temp Heart Rate Resp BP SpO2   -- 06/01/17 1315 06/01/17 1315 -- 06/01/17 1316    123 26  100 %      Temp src Heart Rate Source Patient Position BP Location FiO2 (%)   -- 06/01/17 1315 -- -- --    Monitor          Physical Exam   Constitutional: He is oriented to person, place, and time. He appears distressed (severely).   HENT:   Head: Normocephalic and atraumatic.   Eyes: EOM are normal.   Neck: Normal range of motion.   Cardiovascular: Regular rhythm, normal heart sounds and intact distal pulses.  Tachycardia present.    No murmur heard.  Pulses:       Posterior tibial pulses are 2+ on the right side, and 2+ on the left side.   Pulmonary/Chest: Accessory muscle usage present. Tachypnea noted. He is in respiratory distress (severe). He has decreased breath sounds. He has wheezes. He has rhonchi. He has no rales.   Poor air movement heard bilaterally.    Abdominal: Soft. Bowel sounds are normal. There is no tenderness.   Musculoskeletal: Normal range of motion. He exhibits no edema.   Neurological: He is alert and oriented to person, place, and time.   Skin: Skin is warm and dry.   Psychiatric: Affect normal.   Nursing note and vitals  reviewed.      LAB RESULTS  Lab Results (last 24 hours)     Procedure Component Value Units Date/Time    Comprehensive Metabolic Panel [[id number]] Collected:  06/01/17 1318    Specimen:  Blood Updated:  06/01/17 1351     Glucose 89 mg/dL      BUN 10 mg/dL      Creatinine 1.16 mg/dL      Sodium 137 mmol/L      Potassium 4.1 mmol/L      Chloride 98 mmol/L      CO2 27.8 mmol/L      Calcium 9.2 mg/dL      Total Protein 8.1 g/dL      Albumin 4.20 g/dL      ALT (SGPT) 21 U/L      AST (SGOT) 22 U/L      Alkaline Phosphatase 61 U/L      Total Bilirubin 0.6 mg/dL      eGFR Non African Amer 74 mL/min/1.73      eGFR  African Amer 90 mL/min/1.73      Globulin 3.9 gm/dL      A/G Ratio 1.1 g/dL      BUN/Creatinine Ratio 8.6     Anion Gap 11.2 mmol/L     CBC & Differential [[id number]] Collected:  06/01/17 1318    Specimen:  Blood Updated:  06/01/17 1332    Narrative:       The following orders were created for panel order CBC & Differential.  Procedure                               Abnormality         Status                     ---------                               -----------         ------                     CBC Auto Differential[[id number]]        Abnormal            Final result                 Please view results for these tests on the individual orders.    Protime-INR [[id number]]  (Abnormal) Collected:  06/01/17 1318    Specimen:  Blood Updated:  06/01/17 1341     Protime 13.9 Seconds      INR 1.11 (H)    Troponin [[id number]]  (Normal) Collected:  06/01/17 1318    Specimen:  Blood Updated:  06/01/17 1351     Troponin T <0.010 ng/mL     Narrative:       Troponin T Reference Ranges:  Less than 0.03 ng/mL:    Negative for AMI  0.03 to 0.09 ng/mL:      Indeterminant for AMI  Greater than 0.09 ng/mL: Positive for AMI    BNP [[id number]]  (Normal) Collected:  06/01/17 1318    Specimen:  Blood Updated:  06/01/17 1349     proBNP 29.7 pg/mL     Narrative:       Among patients with dyspnea, NT-proBNP is highly sensitive for the  detection of acute congestive heart failure. In addition NT-proBNP of <300 pg/ml effectively rules out acute congestive heart failure with 99% negative predictive value.    CBC Auto Differential [[id number]]  (Abnormal) Collected:  06/01/17 1318    Specimen:  Blood Updated:  06/01/17 1332     WBC 10.44 10*3/mm3      RBC 5.35 10*6/mm3      Hemoglobin 16.0 g/dL      Hematocrit 47.2 %      MCV 88.2 fL      MCH 29.9 pg      MCHC 33.9 g/dL      RDW 14.1 %      RDW-SD 45.3 fl      MPV 11.0 fL      Platelets 274 10*3/mm3      Neutrophil % 32.4 (L) %      Lymphocyte % 35.7 %      Monocyte % 13.2 (H) %      Eosinophil % 17.6 (H) %      Basophil % 0.8 %      Immature Grans % 0.3 %      Neutrophils, Absolute 3.38 10*3/mm3      Lymphocytes, Absolute 3.73 10*3/mm3      Monocytes, Absolute 1.38 (H) 10*3/mm3      Eosinophils, Absolute 1.84 (H) 10*3/mm3      Basophils, Absolute 0.08 10*3/mm3      Immature Grans, Absolute 0.03 10*3/mm3         I ordered the above labs and reviewed the results      RADIOLOGY  XR Chest 1 View   Final Result   The lungs are well-expanded and clear and the heart and hilar structures  are normal. There is no acute disease.     I ordered the above noted radiological studies. Interpreted by radiologist. Reviewed by me in PACS.       PROCEDURES  Critical Care  Performed by: TEVIN WONG  Authorized by: TEVIN WONG   Total critical care time: 35 minutes  Critical care time was exclusive of separately billable procedures and treating other patients.  Critical care was necessary to treat or prevent imminent or life-threatening deterioration of the following conditions: respiratory failure.  Critical care was time spent personally by me on the following activities: discussions with consultants, evaluation of patient's response to treatment, obtaining history from patient or surrogate, ordering and review of laboratory studies, pulse oximetry, review of old charts, development of treatment plan with patient or  surrogate, examination of patient, ordering and performing treatments and interventions, ordering and review of radiographic studies and re-evaluation of patient's condition.      EKG           EKG time: 1335  Rhythm/Rate: Sinus tach rate in 130s  P waves and [place]: normal  QRS, axis: normal   ST and T waves: normal     Interpreted Contemporaneously by me, independently viewed  No old for comparison      PROGRESS AND CONSULTS  ED Course     1309  Ordered blood work, PT-INR, troponin, BNP for further evaluation.    1312  Pt is 98% on non-rebreather.    1314  Ordered Duo-Neb, albuterol, Solu-medrol for SOA.    1317  No pneumothorax seen on CXR.    1337  Rechecked pt who is more comfortable and able to speak a few sentences. Pt states he feels that he needs to cough but is unable to and that he has family en route. Pt understands and agrees with the plan. All questions answered.    1401  Rechecked pt who is c/o increased SOA. D/w pt plan to give more breathing tx and admit pt to ICU. Pt able to speak in short sentences and requests MD to call his mother. Called pt's mother who states family can be at the hospital w/in the next 30 minutes.   O2 96 % non-rebreather, HR 130s, /109  Ordered repeat albuterol. Placed call to admit pt to CCU.    1412  Call returned from Dr. Andrea, pulmonology, who will admit pt to CCU and recommends putting pt on BiPAP.  Rechecked pt who states he has not had a BiPAP before. D/w pt plan to BiPAP pt. Pt understands and agrees with the plan. All questions answered.  Ordered BiPAP for SOA.    1431  BP: 129/100 HR: (!) 136 Temp: 98.8 °F (37.1 °C) (Tympanic) O2 sat: 95%  Rechecked pt who appears to be more comfortable. Spoke w/ RT who are on the way w/ BiPAP.    1502  Rechecked pt who appears more comfortable on BiPAP.  BP: (!) 132/96  HR: (!) 120 Temp: 98.8 °F (37.1 °C) (Tympanic) O2 sat: 95%      MEDICAL DECISION MAKING  Results were reviewed/discussed with the patient and they were also  made aware of online access. Pt also made aware that some labs, such as cultures, will not be resulted during ER visit and follow up with PMD is necessary.     MDM  Number of Diagnoses or Management Options  Severe persistent asthma with status asthmaticus:      Amount and/or Complexity of Data Reviewed  Clinical lab tests: ordered and reviewed (Lab work is unremarkable.)  Tests in the radiology section of CPT®:  ordered and reviewed (CXR is negative acute.)  Tests in the medicine section of CPT®:  reviewed and ordered (See EKG procedure note.)  Decide to obtain previous medical records or to obtain history from someone other than the patient: yes  Obtain history from someone other than the patient: yes  Review and summarize past medical records: yes  Discuss the patient with other providers: yes (Dr. Andrea, pulmonology)  Independent visualization of images, tracings, or specimens: yes    Critical Care  Total time providing critical care: 30-74 minutes    Patient Progress  Patient progress: stable         DIAGNOSIS  Final diagnoses:   Severe persistent asthma with status asthmaticus       DISPOSITION  ADMISSION TO CCU    Discussed treatment plan and reason for admission with pt/family and admitting physician.  Pt/family voiced understanding of the plan for admission for further testing/treatment as needed.       Latest Documented Vital Signs:  As of 3:20 PM  BP- 132/96 HR- 86 Temp- 98.8 °F (37.1 °C) (Tympanic) O2 sat- 95%    --  Documentation assistance provided by mike Walker for Dr. Oconnell.  Information recorded by the scribe was done at my direction and has been verified and validated by me.     Abdulaziz Walker  06/01/17 1520       Sofia Oconnell MD  06/02/17 0103       Electronically signed by Sofia Oconnell MD at 6/2/2017  1:03 AM        Vital Signs (last 24 hours)       06/01 0700  -  06/02 0659 06/02 0700  -  06/02 0942   Most Recent    Temp (°F) 98.2 -  98.8      97.5     97.5 (36.4)    Heart Rate 74  "-  (!)140    71 -  88     88    Resp 16 -  28      18     18    /61 -  (!) 149/109    124/73 -  126/76     124/73    SpO2 (%) 90 -  100    95 -  97     95          Lines, Drains & Airways    Active LDAs     Name:   Placement date:   Placement time:   Site:   Days:    Peripheral IV Line - Single Lumen 06/01/17 1315 median cubital vein (antecubital fossa), left 20 gauge  06/01/17    1315      less than 1    Peripheral IV Line - Single Lumen 06/01/17 1316 basilic vein (medial side of arm), right 20 gauge  06/01/17    1316      less than 1         Inactive LDAs     None                Hospital Medications (active)       Dose Frequency Start End    acetaminophen (TYLENOL) suppository 650 mg 650 mg Every 4 Hours PRN 6/1/2017     Sig - Route: Insert 1 suppository into the rectum Every 4 (Four) Hours As Needed for Fever (temperature greater than 101.5 F or headache). - Rectal    Linked Group 1:  \"Or\" Linked Group Details        acetaminophen (TYLENOL) tablet 650 mg 650 mg Every 4 Hours PRN 6/1/2017     Sig - Route: Take 2 tablets by mouth Every 4 (Four) Hours As Needed for Headache or Fever (fever greater than 101.5 F). - Oral    Linked Group 1:  \"Or\" Linked Group Details        albuterol (PROVENTIL) nebulizer solution 0.083% 2.5 mg/3mL 2.5 mg Once 6/1/2017 6/1/2017    Sig - Route: Take 2.5 mg by nebulization 1 (One) Time. - Nebulization    albuterol (PROVENTIL) nebulizer solution 0.083% 2.5 mg/3mL 2.5 mg Once 6/1/2017 6/1/2017    Sig - Route: Take 2.5 mg by nebulization 1 (One) Time. - Nebulization    albuterol (PROVENTIL) nebulizer solution 0.083% 2.5 mg/3mL 2.5 mg Every 15 Minutes 6/1/2017 6/1/2017    Sig - Route: Take 2.5 mg by nebulization Every 15 (Fifteen) Minutes. - Nebulization    aluminum-magnesium hydroxide-simethicone (MAALOX/MYLANTA) suspension 30 mL 30 mL Every 6 Hours PRN 6/1/2017     Sig - Route: Take 30 mL by mouth Every 6 (Six) Hours As Needed for Heartburn. - Oral    bisacodyl (DULCOLAX) EC tablet 5 " mg 5 mg Daily PRN 6/1/2017     Sig - Route: Take 1 tablet by mouth Daily As Needed for Constipation. - Oral    bisacodyl (DULCOLAX) suppository 10 mg 10 mg Daily PRN 6/1/2017     Sig - Route: Insert 1 suppository into the rectum Daily As Needed for Constipation. - Rectal    budesonide (PULMICORT) nebulizer solution 0.5 mg 0.5 mg 2 Times Daily - RT 6/1/2017     Sig - Route: Take 2 mL by nebulization 2 (Two) Times a Day. - Nebulization    calcium carbonate (TUMS) chewable tablet 500 mg (200 mg elemental) 2 tablet 2 Times Daily PRN 6/1/2017     Sig - Route: Chew 1,000 mg 2 (Two) Times a Day As Needed for Heartburn. - Oral    enoxaparin (LOVENOX) syringe 40 mg 40 mg Daily 6/1/2017     Sig - Route: Inject 0.4 mL under the skin Daily. - Subcutaneous    ipratropium-albuterol (DUO-NEB) nebulizer solution 3 mL 3 mL Once 6/1/2017 6/1/2017    Sig - Route: Take 3 mL by nebulization 1 (One) Time. - Nebulization    ipratropium-albuterol (DUO-NEB) nebulizer solution 3 mL 3 mL Every 4 Hours - RT 6/1/2017     Sig - Route: Take 3 mL by nebulization Every 4 (Four) Hours. - Nebulization    ipratropium-albuterol (DUO-NEB) nebulizer solution 3 mL 3 mL Every 4 Hours PRN 6/1/2017     Sig - Route: Take 3 mL by nebulization Every 4 (Four) Hours As Needed for Shortness of Air. - Nebulization    magnesium sulfate 2g/50 mL (PREMIX) infusion 2 g Once 6/1/2017 6/1/2017    Sig - Route: Infuse 50 mL into a venous catheter 1 (One) Time. - Intravenous    methylPREDNISolone sodium succinate (SOLU-Medrol) injection 125 mg 125 mg Once 6/1/2017 6/1/2017    Sig - Route: Infuse 2 mL into a venous catheter 1 (One) Time. - Intravenous    methylPREDNISolone sodium succinate (SOLU-Medrol) injection 60 mg 60 mg Every 6 Hours 6/1/2017     Sig - Route: Infuse 0.96 mL into a venous catheter Every 6 (Six) Hours. - Intravenous    ondansetron (ZOFRAN) injection 4 mg 4 mg Every 6 Hours PRN 6/1/2017     Sig - Route: Infuse 2 mL into a venous catheter Every 6 (Six)  "Hours As Needed for Nausea or Vomiting. - Intravenous    Linked Group 2:  \"Or\" Linked Group Details        ondansetron (ZOFRAN) tablet 4 mg 4 mg Every 6 Hours PRN 6/1/2017     Sig - Route: Take 1 tablet by mouth Every 6 (Six) Hours As Needed for Nausea or Vomiting. - Oral    Linked Group 2:  \"Or\" Linked Group Details        ondansetron ODT (ZOFRAN-ODT) disintegrating tablet 4 mg 4 mg Every 6 Hours PRN 6/1/2017     Sig - Route: Take 1 tablet by mouth Every 6 (Six) Hours As Needed for Nausea or Vomiting. - Oral    Linked Group 2:  \"Or\" Linked Group Details        pneumococcal polysaccharide 23-valent (PNEUMOVAX-23) vaccine 0.5 mL 0.5 mL During Hospitalization 6/1/2017     Sig - Route: Inject 0.5 mL into the shoulder, thigh, or buttocks During Hospitalization for Immunization. - Intramuscular    sodium chloride 0.9 % flush 1-10 mL 1-10 mL As Needed 6/1/2017     Sig - Route: Infuse 1-10 mL into a venous catheter As Needed for Line Care. - Intravenous    sodium chloride 0.9 % flush 10 mL 10 mL As Needed 6/1/2017     Sig - Route: Infuse 10 mL into a venous catheter As Needed for Line Care. - Intravenous    Linked Group 3:  \"And\" Linked Group Details        albuterol (PROVENTIL) nebulizer solution 0.083% 2.5 mg/3mL (Discontinued) 2.5 mg As Needed 6/1/2017 6/1/2017    Sig - Route: Take 2.5 mg by nebulization As Needed for Wheezing or Shortness of Air. - Nebulization    budesonide (PULMICORT) nebulizer solution 0.5 mg (Discontinued) 0.5 mg 2 Times Daily - RT 6/1/2017 6/1/2017    Sig - Route: Take 2 mL by nebulization 2 (Two) Times a Day. - Nebulization    ipratropium-albuterol (DUO-NEB) nebulizer solution 3 mL (Discontinued) 3 mL Every 6 Hours PRN 6/1/2017 6/1/2017    Sig - Route: Take 3 mL by nebulization Every 6 (Six) Hours As Needed for Wheezing or Shortness of Air. - Nebulization    methylPREDNISolone sodium succinate (SOLU-Medrol) injection 60 mg (Discontinued) 60 mg Every 6 Hours 6/1/2017 6/1/2017    Sig - Route: " Infuse 0.96 mL into a venous catheter Every 6 (Six) Hours. - Intravenous                       Plan of Care  Date of Service: 6/2/2017 6:29 AM  Martha Wheeler RN      Problem: Patient Care Overview (Adult)  Goal: Plan of Care Review  Outcome: Ongoing (interventions implemented as appropriate)     06/02/17 0627   Coping/Psychosocial Response Interventions   Plan Of Care Reviewed With patient   Patient Care Overview   Progress improving   Outcome Evaluation   Outcome Summary/Follow up Plan Pt. off bipap all evening. Oxygen sats 94-96% on 3L NC. No reports of CP or SOA. Wheezes still heard on expiration. VSS through night. Morning ABGs improved. Continuing to monitor.           Problem: Activity Intolerance (Adult)  Goal: Activity Tolerance  Outcome: Ongoing (interventions implemented as appropriate)     Problem: Anxiety (Adult)  Goal: Reduction/Resolution  Outcome: Ongoing (interventions implemented as appropriate)     Problem: Respiratory Insufficiency (Adult)  Goal: Acid/Base Balance  Outcome: Ongoing (interventions implemented as appropriate)  Goal: Effective Ventilation  Outcome: Ongoing (interventions implemented as appropriate)

## 2017-06-02 NOTE — PLAN OF CARE
Problem: Patient Care Overview (Adult)  Goal: Plan of Care Review  Outcome: Ongoing (interventions implemented as appropriate)    06/02/17 0307   Coping/Psychosocial Response Interventions   Plan Of Care Reviewed With patient   Patient Care Overview   Progress improving   Outcome Evaluation   Outcome Summary/Follow up Plan Patient not requiring bipap today, tolerating 2-3L NC. Patient now with productive cough, reports intermittent headache, resolves with tylenol. Breathing tight but wheezing near gone. Patient awaiting for tele bed, questions answered and plan of care discussed. VS stable, will continue to monitor.          Problem: Activity Intolerance (Adult)  Goal: Activity Tolerance  Outcome: Ongoing (interventions implemented as appropriate)  Goal: Effective Energy Conservation Techniques  Outcome: Ongoing (interventions implemented as appropriate)    Problem: Anxiety (Adult)  Goal: Reduction/Resolution  Outcome: Ongoing (interventions implemented as appropriate)    Problem: Respiratory Insufficiency (Adult)  Goal: Acid/Base Balance  Outcome: Ongoing (interventions implemented as appropriate)  Goal: Effective Ventilation  Outcome: Ongoing (interventions implemented as appropriate)

## 2017-06-02 NOTE — PROGRESS NOTES
PROGRESS NOTE   LOS: 1 day   Patient Care Team:  No Known Provider as PCP - General    Chief Complaint: Shortness of breath and coughing spells    Interval History: 31-year-old gentleman with history of asthma and previous hospitalization with mechanical ventilation for asthma attacks who came in through the emergency room and was admitted and was started on IV steroid.  He was admitted to the intensive care unit because he was requiring noninvasive positive pressure ventilation.  Over the course of the following day the patient was doing better and was able to come off the BiPAP for several hours and he is tolerating by mouth he is still coughing he still wheezing and he is on nasal cannula oxygen but does have relative significant improvement compared to the presentation.  There is no fever or chills there is no hemoptysis.  No GI or  complaints.    REVIEW OF SYSTEMS:   CARDIOVASCULAR: No chest pain, chest pressure ,chest discomfo chest discomfort is  related to his cough . No palpitations or edema.   RESPIRATORY: Shortness of breath especially with coughing spells with chest tightness and wheezing.   GASTROINTESTINAL: No anorexia, nausea, vomiting or diarrhea. No abdominal pain or blood.   HEMATOLOGIC: No bleeding or bruising.     Ventilator/Non-Invasive Ventilation Settings     Start     Ordered    06/01/17 1416  . BIPAP; IPAP: 14; EPAP: 7; Titrate for SPO2: 94%  Until Discontinued,   Status:  Canceled     Question Answer Comment   . BIPAP    IPAP 14    EPAP 7    Titrate for SPO2 94%        06/01/17 1415            Vital Signs  Temp:  [97.5 °F (36.4 °C)-98.8 °F (37.1 °C)] 98.3 °F (36.8 °C)  Heart Rate:  [] 96  Resp:  [16-28] 18  BP: (107-149)/() 120/66  SpO2:  [90 %-100 %] 93 %  on  Flow (L/min):  [2-15] 3 O2 Device: nasal cannula    Intake/Output Summary (Last 24 hours) at 06/02/17 1236  Last data filed at 06/02/17 1202   Gross per 24 hour   Intake                0 ml   Output              850  "ml   Net             -850 ml     Flowsheet Rows         First Filed Value    Admission Height  68\" (172.7 cm) Documented at 06/01/2017 1315    Admission Weight  140 lb (63.5 kg) Documented at 06/01/2017 1315        Last 3 weights    06/01/17  1315 06/01/17  1555 06/02/17  0439   Weight: 140 lb (63.5 kg) 136 lb 6.4 oz (61.9 kg) 135 lb 9.6 oz (61.5 kg)       Physical Exam:  GEN:  No acute distress, alert, cooperative, well developed   EYES:   Sclera clear. No icterus. PERRL. Normal EOM  ENT:   External ears/nose normal, no oral lesions, no thrush, mucous membranes moist  NECK:  Supple, midline trachea, no JVD  LUNGS: Normal chest on inspection, Positive expiratory and poor lower extent inspiratory wheezes. No rhonchi. No crackles. Respirations regular, even and unlabored.   CV:  Regular rhythm and rate. Normal S1/S2. No murmurs, gallops, or rubs noted.  ABD:  Soft, non-tender and non-distended. Normal bowel sounds. No guarding  EXT:  Moves all extremities well. No cyanosis. No redness. No edema.   Skin: dry, intact, no bleeding    Results Review:        Results from last 7 days  Lab Units 06/01/17  1318   SODIUM mmol/L 137   POTASSIUM mmol/L 4.1   CHLORIDE mmol/L 98   TOTAL CO2 mmol/L 27.8   BUN mg/dL 10   CREATININE mg/dL 1.16   CALCIUM mg/dL 9.2   AST (SGOT) U/L 22   ALT (SGPT) U/L 21   ANION GAP mmol/L 11.2   ALBUMIN g/dL 4.20       Results from last 7 days  Lab Units 06/01/17  1318   TROPONIN T ng/mL <0.010       Results from last 7 days  Lab Units 06/01/17  1318   WBC 10*3/mm3 10.44   HEMOGLOBIN g/dL 16.0   HEMATOCRIT % 47.2   PLATELETS 10*3/mm3 274   NEUTROPHIL % % 32.4*   LYMPHOCYTE % % 35.7   MONOCYTES % % 13.2*   EOSINOPHIL % % 17.6*   BASOPHIL % % 0.8   IMM GRAN % % 0.3       Results from last 7 days  Lab Units 06/01/17  1318   INR  1.11*               Results from last 7 days  Lab Units 06/02/17  0428 06/01/17  1740   PH, ARTERIAL pH units 7.414 7.381   PO2 ART mm Hg 95.6 120.3*   PCO2, ARTERIAL mm Hg 42.8 " 44.1   HCO3 ART mmol/L 27.4 26.2         Glucose   Date/Time Value Ref Range Status   06/02/2017 1200 140 (H) 70 - 130 mg/dL Final   06/02/2017 0729 109 70 - 130 mg/dL Final   06/02/2017 0329 123 70 - 130 mg/dL Final   06/01/2017 2342 116 70 - 130 mg/dL Final   06/01/2017 2215 115 70 - 130 mg/dL Final   06/01/2017 1559 82 70 - 130 mg/dL Final           Results from last 7 days  Lab Units 06/01/17  1318   PROBNP pg/mL 29.7                       Imaging Results (all)     Procedure Component Value Units Date/Time    XR Chest 1 View [[id number]] Collected:  06/01/17 1416     Updated:  06/01/17 1426    Narrative:       ONE VIEW PORTABLE CHEST     HISTORY: Shortness of breath. Asthma.     The lungs are well-expanded and clear and the heart and hilar structures  are normal. There is no acute disease.     This report was finalized on 6/1/2017 2:23 PM by Dr. Tex Haji MD.             I reviewed the patient's new clinical results.  I personally viewed and interpreted the patient's imaging results:No acute disease        Medication Review:     budesonide 0.5 mg Nebulization BID - RT   enoxaparin 40 mg Subcutaneous Daily   ipratropium-albuterol 3 mL Nebulization Q4H - RT   lisinopril 5 mg Oral Q24H   methylPREDNISolone sodium succinate 60 mg Intravenous Q6H            ASSESSMENT:   1. Status asthmaticus  2. Acute hypoxemic respiratory failure secondary to the above  3. history of hypertension    PLAN:  Continue with the steroid and reduce the dose to every 12 hours  Continue with the bronchodilator and with inhaled corticosteroid  We'll move to a stepdown unit and hopefully home in   2 or 3 days depending on his progress and response to treatment  Disposition: Home    Kiarra Davison MD  06/02/17  12:36 PM          EMR Dragon/Transcription disclaimer:   Much of this encounter note is an electronic transcription/translation of spoken language to printed text. The electronic translation of spoken language may permit erroneous,  or at times, nonsensical words or phrases to be inadvertently transcribed; Although I have reviewed the note for such errors, some may still exist.

## 2017-06-02 NOTE — PLAN OF CARE
Problem: Patient Care Overview (Adult)  Goal: Plan of Care Review  Outcome: Ongoing (interventions implemented as appropriate)    06/02/17 0627   Coping/Psychosocial Response Interventions   Plan Of Care Reviewed With patient   Patient Care Overview   Progress improving   Outcome Evaluation   Outcome Summary/Follow up Plan Pt. off bipap all evening. Oxygen sats 94-96% on 3L NC. No reports of CP or SOA. Wheezes still heard on expiration. VSS through night. Morning ABGs improved. Continuing to monitor.          Problem: Activity Intolerance (Adult)  Goal: Activity Tolerance  Outcome: Ongoing (interventions implemented as appropriate)    Problem: Anxiety (Adult)  Goal: Reduction/Resolution  Outcome: Ongoing (interventions implemented as appropriate)    Problem: Respiratory Insufficiency (Adult)  Goal: Acid/Base Balance  Outcome: Ongoing (interventions implemented as appropriate)  Goal: Effective Ventilation  Outcome: Ongoing (interventions implemented as appropriate)

## 2017-06-03 LAB — GLUCOSE BLDC GLUCOMTR-MCNC: 109 MG/DL (ref 70–130)

## 2017-06-03 PROCEDURE — 82962 GLUCOSE BLOOD TEST: CPT

## 2017-06-03 PROCEDURE — 25010000002 METHYLPREDNISOLONE PER 125 MG: Performed by: INTERNAL MEDICINE

## 2017-06-03 PROCEDURE — 94799 UNLISTED PULMONARY SVC/PX: CPT

## 2017-06-03 PROCEDURE — 25010000002 ENOXAPARIN PER 10 MG: Performed by: INTERNAL MEDICINE

## 2017-06-03 PROCEDURE — 63710000001 PREDNISONE PER 5 MG: Performed by: INTERNAL MEDICINE

## 2017-06-03 RX ADMIN — LISINOPRIL 5 MG: 5 TABLET ORAL at 08:05

## 2017-06-03 RX ADMIN — IPRATROPIUM BROMIDE AND ALBUTEROL SULFATE 3 ML: .5; 3 SOLUTION RESPIRATORY (INHALATION) at 19:19

## 2017-06-03 RX ADMIN — METHYLPREDNISOLONE SODIUM SUCCINATE 60 MG: 125 INJECTION, POWDER, FOR SOLUTION INTRAMUSCULAR; INTRAVENOUS at 11:37

## 2017-06-03 RX ADMIN — BUDESONIDE 0.5 MG: 0.5 INHALANT RESPIRATORY (INHALATION) at 08:10

## 2017-06-03 RX ADMIN — BUDESONIDE 0.5 MG: 0.5 INHALANT RESPIRATORY (INHALATION) at 19:19

## 2017-06-03 RX ADMIN — IPRATROPIUM BROMIDE AND ALBUTEROL SULFATE 3 ML: .5; 3 SOLUTION RESPIRATORY (INHALATION) at 03:21

## 2017-06-03 RX ADMIN — PREDNISONE 60 MG: 50 TABLET ORAL at 14:12

## 2017-06-03 RX ADMIN — IPRATROPIUM BROMIDE AND ALBUTEROL SULFATE 3 ML: .5; 3 SOLUTION RESPIRATORY (INHALATION) at 08:10

## 2017-06-03 RX ADMIN — ACETAMINOPHEN 650 MG: 325 TABLET ORAL at 22:07

## 2017-06-03 RX ADMIN — IPRATROPIUM BROMIDE AND ALBUTEROL SULFATE 3 ML: .5; 3 SOLUTION RESPIRATORY (INHALATION) at 16:06

## 2017-06-03 RX ADMIN — ENOXAPARIN SODIUM 40 MG: 40 INJECTION SUBCUTANEOUS at 08:05

## 2017-06-03 RX ADMIN — IPRATROPIUM BROMIDE AND ALBUTEROL SULFATE 3 ML: .5; 3 SOLUTION RESPIRATORY (INHALATION) at 11:58

## 2017-06-03 RX ADMIN — IPRATROPIUM BROMIDE AND ALBUTEROL SULFATE 3 ML: .5; 3 SOLUTION RESPIRATORY (INHALATION) at 23:37

## 2017-06-03 NOTE — PLAN OF CARE
Problem: Patient Care Overview (Adult)  Goal: Plan of Care Review  Outcome: Ongoing (interventions implemented as appropriate)    06/03/17 0626   Coping/Psychosocial Response Interventions   Plan Of Care Reviewed With patient   Patient Care Overview   Progress progress toward functional goals as expected   Outcome Evaluation   Outcome Summary/Follow up Plan pt v/s stable, no problems thru night.          Problem: Activity Intolerance (Adult)  Goal: Activity Tolerance  Outcome: Ongoing (interventions implemented as appropriate)    Problem: Respiratory Insufficiency (Adult)  Goal: Acid/Base Balance  Outcome: Ongoing (interventions implemented as appropriate)

## 2017-06-03 NOTE — PLAN OF CARE
Problem: Patient Care Overview (Adult)  Goal: Plan of Care Review  Outcome: Ongoing (interventions implemented as appropriate)    06/03/17 7665   Coping/Psychosocial Response Interventions   Plan Of Care Reviewed With patient   Patient Care Overview   Progress improving   Outcome Evaluation   Outcome Summary/Follow up Plan Patient weaned to RA, able to tolerate activity. Steroids changed to PO, ambulating independently. Status changed to M/S, anticipate dc in 1-2 days. Patient questions answered, plan of care discussed. VS stable, will continue to monitor.          Problem: Activity Intolerance (Adult)  Goal: Activity Tolerance  Outcome: Ongoing (interventions implemented as appropriate)  Goal: Effective Energy Conservation Techniques  Outcome: Ongoing (interventions implemented as appropriate)    Problem: Anxiety (Adult)  Goal: Reduction/Resolution  Outcome: Outcome(s) achieved Date Met:  06/03/17    Problem: Respiratory Insufficiency (Adult)  Goal: Acid/Base Balance  Outcome: Ongoing (interventions implemented as appropriate)  Goal: Effective Ventilation  Outcome: Ongoing (interventions implemented as appropriate)

## 2017-06-03 NOTE — PROGRESS NOTES
LOS: 2 days   Patient Care Team:  No Known Provider as PCP - General    Chief Complaint:  Shortness of breath    Subjective: Patient reports she is doing much better today he sitting up in bed eating he is now on 3 L nasal cannula O2 his oxygen saturations are about 96%.  No chest pain mild cough no sputum          Review of Systems:   Patient reports he takes his Symbicort regularly twice a day and uses Combivent when necessary he does not smoke he has had difficult asthma for years      Objective     Vital Signs  Temp:  [97.5 °F (36.4 °C)-99 °F (37.2 °C)] 98.4 °F (36.9 °C)  Heart Rate:  [] 98  Resp:  [16-18] 18  BP: (111-139)/(66-87) 132/86  Body mass index is 20.05 kg/(m^2).    Intake/Output Summary (Last 24 hours) at 06/03/17 1216  Last data filed at 06/03/17 0811   Gross per 24 hour   Intake              240 ml   Output              775 ml   Net             -535 ml     I/O this shift:  In: -   Out: 275 [Urine:275]    Physical Exam:  General Appearance: Well-developed black male resting comfortably in bed in no apparent distress  Eyes: Conjunctiva are clear and anicteric  ENT: Mucous membranes moist no erythema or exudates Mallampati 1 airway  Neck: No adenopathy or thyromegaly no jugular venous distention trachea midline  Lungs: He has expiratory wheezes more on the left than the right with their course she doesn't have the real tight expiratory wheezes and he is nonlabored and moving air well talking in full sentences  Cardiac: Regular rate and rhythm no murmur  Abdomen: Soft no palpable organomegaly or masses  : Not examined  Musc/Skel: Grossly normal  Skin: No Jaundice no petechiae no rashes noted  Neuro: He is alert oriented cooperative following commands moving all extremities grossly intact  Extremities/P Vascular: No clubbing cyanosis or edema he has palpable radial dorsalis pedis pulses  MSE: Seems to be in very good spirits today        Labs:  WBC No results found for: WBCS   HGB  Hemoglobin   Date Value Ref Range Status   06/01/2017 16.0 13.7 - 17.6 g/dL Final      HCT Hematocrit   Date Value Ref Range Status   06/01/2017 47.2 40.4 - 52.2 % Final      Platlets No results found for: LABPLAT     PT/INR:    Protime   Date Value Ref Range Status   06/01/2017 13.9 11.7 - 14.2 Seconds Final   /  INR   Date Value Ref Range Status   06/01/2017 1.11 (H) 0.90 - 1.10 Final       Sodium Sodium   Date Value Ref Range Status   06/01/2017 137 136 - 145 mmol/L Final      Potassium Potassium   Date Value Ref Range Status   06/01/2017 4.1 3.5 - 5.2 mmol/L Final      Chloride Chloride   Date Value Ref Range Status   06/01/2017 98 98 - 107 mmol/L Final      Bicarbonate No results found for: PLASMABICARB   BUN BUN   Date Value Ref Range Status   06/01/2017 10 6 - 20 mg/dL Final      Creatinine Creatinine   Date Value Ref Range Status   06/01/2017 1.16 0.76 - 1.27 mg/dL Final      Calcium Calcium   Date Value Ref Range Status   06/01/2017 9.2 8.6 - 10.5 mg/dL Final      Magnesium No results found for: MG         pH pH, Arterial   Date Value Ref Range Status   06/02/2017 7.414 7.350 - 7.450 pH units Final   06/01/2017 7.381 7.350 - 7.450 pH units Final      pO2 pO2, Arterial   Date Value Ref Range Status   06/02/2017 95.6 80.0 - 100.0 mm Hg Final   06/01/2017 120.3 (H) 80.0 - 100.0 mm Hg Final      pCO2 pCO2, Arterial   Date Value Ref Range Status   06/02/2017 42.8 35.0 - 45.0 mm Hg Final   06/01/2017 44.1 35.0 - 45.0 mm Hg Final      HCO3 HCO3, Arterial   Date Value Ref Range Status   06/02/2017 27.4 22.0 - 28.0 mmol/L Final   06/01/2017 26.2 22.0 - 28.0 mmol/L Final          budesonide 0.5 mg Nebulization BID - RT   enoxaparin 40 mg Subcutaneous Daily   ipratropium-albuterol 3 mL Nebulization Q4H - RT   lisinopril 5 mg Oral Q24H   predniSONE 60 mg Oral Daily          Diagnostics:  Imaging Results (last 24 hours)     ** No results found for the last 24 hours. **          Didn't look at his admission chest x-ray  definite active disease        Assessment/Plan     Patient Active Problem List   Diagnosis Code   • Severe persistent asthma with status asthmaticus J45.52       Impression status asthmaticus much improved although still wheezing although patient tells me he always wheezes I think he needs more aggressive therapy at home and I will discuss therapy and he is going need close follow-up.  I think we can downgrade him from a telemetry to a regular MedSurg bed.  #2 hypertension blood pressure controlled continue home medications    Plan for disposition: Home in the next day or 2 hopefully    Yannick Ruiz MD  06/03/17  12:16 PM    Time:

## 2017-06-04 VITALS
HEART RATE: 89 BPM | WEIGHT: 142.2 LBS | DIASTOLIC BLOOD PRESSURE: 77 MMHG | BODY MASS INDEX: 21.06 KG/M2 | RESPIRATION RATE: 16 BRPM | SYSTOLIC BLOOD PRESSURE: 133 MMHG | TEMPERATURE: 98.6 F | OXYGEN SATURATION: 94 % | HEIGHT: 69 IN

## 2017-06-04 LAB
GLUCOSE BLDC GLUCOMTR-MCNC: 83 MG/DL (ref 70–130)
GLUCOSE BLDC GLUCOMTR-MCNC: 86 MG/DL (ref 70–130)
GLUCOSE BLDC GLUCOMTR-MCNC: 89 MG/DL (ref 70–130)

## 2017-06-04 PROCEDURE — 63710000001 PREDNISONE PER 5 MG: Performed by: INTERNAL MEDICINE

## 2017-06-04 PROCEDURE — 94760 N-INVAS EAR/PLS OXIMETRY 1: CPT

## 2017-06-04 PROCEDURE — 82962 GLUCOSE BLOOD TEST: CPT

## 2017-06-04 PROCEDURE — 94799 UNLISTED PULMONARY SVC/PX: CPT

## 2017-06-04 PROCEDURE — 25010000002 ENOXAPARIN PER 10 MG: Performed by: INTERNAL MEDICINE

## 2017-06-04 RX ORDER — PREDNISONE 20 MG/1
60 TABLET ORAL DAILY
Qty: 15 TABLET | Refills: 0 | Status: SHIPPED | OUTPATIENT
Start: 2017-06-04 | End: 2017-09-19 | Stop reason: HOSPADM

## 2017-06-04 RX ORDER — IPRATROPIUM BROMIDE AND ALBUTEROL SULFATE 2.5; .5 MG/3ML; MG/3ML
3 SOLUTION RESPIRATORY (INHALATION) EVERY 4 HOURS PRN
Qty: 360 ML | Refills: 0 | Status: SHIPPED | OUTPATIENT
Start: 2017-06-04 | End: 2022-11-13 | Stop reason: RX

## 2017-06-04 RX ADMIN — PREDNISONE 60 MG: 50 TABLET ORAL at 09:23

## 2017-06-04 RX ADMIN — IPRATROPIUM BROMIDE AND ALBUTEROL SULFATE 3 ML: .5; 3 SOLUTION RESPIRATORY (INHALATION) at 15:21

## 2017-06-04 RX ADMIN — IPRATROPIUM BROMIDE AND ALBUTEROL SULFATE 3 ML: .5; 3 SOLUTION RESPIRATORY (INHALATION) at 11:08

## 2017-06-04 RX ADMIN — IPRATROPIUM BROMIDE AND ALBUTEROL SULFATE 3 ML: .5; 3 SOLUTION RESPIRATORY (INHALATION) at 07:28

## 2017-06-04 RX ADMIN — LISINOPRIL 5 MG: 5 TABLET ORAL at 09:23

## 2017-06-04 RX ADMIN — ENOXAPARIN SODIUM 40 MG: 40 INJECTION SUBCUTANEOUS at 09:23

## 2017-06-04 RX ADMIN — BUDESONIDE 0.5 MG: 0.5 INHALANT RESPIRATORY (INHALATION) at 07:28

## 2017-06-04 NOTE — PLAN OF CARE
Problem: Patient Care Overview (Adult)  Goal: Plan of Care Review  Outcome: Ongoing (interventions implemented as appropriate)    06/04/17 0116   Coping/Psychosocial Response Interventions   Plan Of Care Reviewed With patient   Patient Care Overview   Progress improving   Outcome Evaluation   Outcome Summary/Follow up Plan Pt. arrived from [facility] this evening. Pt. VS WNL. Pt. now on RA sats wnl. C/o back pain relieved by tylenol. Pt. still recieving duonebs. Pt. resting comfortably, will continue to monitor closely.       Goal: Adult Individualization and Mutuality  Outcome: Ongoing (interventions implemented as appropriate)  Goal: Discharge Needs Assessment  Outcome: Ongoing (interventions implemented as appropriate)    Problem: Activity Intolerance (Adult)  Goal: Activity Tolerance  Outcome: Ongoing (interventions implemented as appropriate)  Goal: Effective Energy Conservation Techniques  Outcome: Ongoing (interventions implemented as appropriate)    Problem: Respiratory Insufficiency (Adult)  Goal: Acid/Base Balance  Outcome: Ongoing (interventions implemented as appropriate)  Goal: Effective Ventilation  Outcome: Ongoing (interventions implemented as appropriate)

## 2017-06-04 NOTE — PLAN OF CARE
Problem: Patient Care Overview (Adult)  Goal: Plan of Care Review  Outcome: Ongoing (interventions implemented as appropriate)    06/04/17 1602   Coping/Psychosocial Response Interventions   Plan Of Care Reviewed With patient   Outcome Evaluation   Outcome Summary/Follow up Plan Continue to monitor pts oxygen saturations on room air, inspiratory wheezes with no reports of shortness of air, pt recieving DN, anxious to be discharged.        Goal: Adult Individualization and Mutuality  Outcome: Ongoing (interventions implemented as appropriate)  Goal: Discharge Needs Assessment  Outcome: Ongoing (interventions implemented as appropriate)

## 2017-06-04 NOTE — PROGRESS NOTES
LOS: 3 days   Patient Care Team:  No Known Provider as PCP - General    Chief Complaint:  Shortness of breath    Subjective: She reports he is doing much better today he's been walking the halls without any difficulty and he would like to go home.          Review of Systems:   Patient reports he takes his Symbicort regularly twice a day and uses Combivent when necessary he does not smoke he has had difficult asthma for years      Objective     Vital Signs  Temp:  [98.2 °F (36.8 °C)-98.6 °F (37 °C)] 98.6 °F (37 °C)  Heart Rate:  [] 89  Resp:  [12-18] 16  BP: (111-133)/(64-81) 133/77  Body mass index is 21 kg/(m^2).  No intake or output data in the 24 hours ending 06/04/17 6653       Physical Exam:  General Appearance: Well-developed black male resting comfortably in bed in no apparent distress  Eyes: Conjunctiva are clear and anicteric  ENT: Mucous membranes moist no erythema or exudates Mallampati 1 airway  Neck: No adenopathy or thyromegaly no jugular venous distention trachea midline  Lungs: Improved he is moving air well I heard a couple scattered expiratory wheezes in the bases coarse nonlabored speaking in full sentences  Cardiac: Regular rate and rhythm no murmur  Abdomen: Soft no palpable organomegaly or masses  : Not examined  Musc/Skel: Grossly normal  Skin: No Jaundice no petechiae no rashes noted  Neuro: He is alert oriented cooperative following commands moving all extremities grossly intact  Extremities/P Vascular: No clubbing cyanosis or edema he has palpable radial dorsalis pedis pulses  MSE: Seems to be in very good spirits today        Labs:  WBC No results found for: WBCS   HGB No results found for: HGB   HCT No results found for: HCT   Platlets No results found for: LABPLAT     PT/INR:    No results found for: PROTIME/  No results found for: INR    Sodium No results found for: NA   Potassium No results found for: K   Chloride No results found for: CL   Bicarbonate No results  found for: PLASMABICARB   BUN No results found for: BUN   Creatinine No results found for: CREATININE   Calcium No results found for: CALCIUM   Magnesium No results found for: MG         pH pH, Arterial   Date Value Ref Range Status   06/02/2017 7.414 7.350 - 7.450 pH units Final      pO2 pO2, Arterial   Date Value Ref Range Status   06/02/2017 95.6 80.0 - 100.0 mm Hg Final      pCO2 pCO2, Arterial   Date Value Ref Range Status   06/02/2017 42.8 35.0 - 45.0 mm Hg Final      HCO3 HCO3, Arterial   Date Value Ref Range Status   06/02/2017 27.4 22.0 - 28.0 mmol/L Final          budesonide 0.5 mg Nebulization BID - RT   enoxaparin 40 mg Subcutaneous Daily   ipratropium-albuterol 3 mL Nebulization Q4H - RT   lisinopril 5 mg Oral Q24H   predniSONE 60 mg Oral Daily          Diagnostics:  Imaging Results (last 24 hours)     ** No results found for the last 24 hours. **                  Assessment/Plan     Patient Active Problem List   Diagnosis Code   • Severe persistent asthma with status asthmaticus J45.52       Impression status asthmaticus much improved Sounds dramatically better today he wants to go home I think we can probably send him home today with by mouth steroids he has a nebulizer and bronchodilators and continue Symbicort.  He follows with Dr. Dorado he will see him in follow-up in a week or 2  #2 hypertension blood pressure controlled continue home medications    Plan for disposition: Home today    Yannick Ruiz MD  06/04/17  6:33 PM    Time:

## 2017-06-04 NOTE — PROGRESS NOTES
Discharge summary    Diagnoses:  #1 status asthmaticus  #2 hypertension      Hospital course patient presented with status asthmaticus he responded very well to steroids and bronchodilators dramatic improvement he wants to go home and I think he is stable to go home he follows with Dr. Ave VELAZQUEZ he will see him in follow-up asking to try and get in semen the next week he is going home with 5 more days of prednisone and he will resume his Symbicort 160 twice a day 2 puffs and when necessary duo nebs or Combivent

## 2017-07-06 ENCOUNTER — HOSPITAL ENCOUNTER (EMERGENCY)
Facility: HOSPITAL | Age: 32
Discharge: HOME | End: 2017-07-06
Attending: EMERGENCY MEDICINE | Admitting: EMERGENCY MEDICINE

## 2017-07-06 ENCOUNTER — APPOINTMENT (OUTPATIENT)
Dept: GENERAL RADIOLOGY | Facility: HOSPITAL | Age: 32
End: 2017-07-06

## 2017-07-06 ENCOUNTER — APPOINTMENT (OUTPATIENT)
Dept: CT IMAGING | Facility: HOSPITAL | Age: 32
End: 2017-07-06

## 2017-07-06 VITALS
TEMPERATURE: 98 F | BODY MASS INDEX: 25.92 KG/M2 | DIASTOLIC BLOOD PRESSURE: 87 MMHG | RESPIRATION RATE: 17 BRPM | OXYGEN SATURATION: 93 % | SYSTOLIC BLOOD PRESSURE: 121 MMHG | HEIGHT: 69 IN | HEART RATE: 96 BPM | WEIGHT: 175 LBS

## 2017-07-06 DIAGNOSIS — J18.9 HOSPITAL-ACQUIRED PNEUMONIA: Primary | ICD-10-CM

## 2017-07-06 DIAGNOSIS — Z87.09 HISTORY OF ASTHMA: ICD-10-CM
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
## 2017-07-06 LAB
ALBUMIN SERPL-MCNC: 4 G/DL (ref 3.5–5.2)
ALBUMIN/GLOB SERPL: 1.1 G/DL
ALP SERPL-CCNC: 60 U/L (ref 39–117)
ALT SERPL W P-5'-P-CCNC: 18 U/L (ref 1–41)
ANION GAP SERPL CALCULATED.3IONS-SCNC: 13 MMOL/L
AST SERPL-CCNC: 20 U/L (ref 1–40)
BASOPHILS # BLD AUTO: 0.04 10*3/MM3 (ref 0–0.2)
BASOPHILS NFR BLD AUTO: 0.4 % (ref 0–1.5)
BILIRUB SERPL-MCNC: 0.8 MG/DL (ref 0.1–1.2)
BUN BLD-MCNC: 9 MG/DL (ref 6–20)
BUN/CREAT SERPL: 8.7 (ref 7–25)
CALCIUM SPEC-SCNC: 8.8 MG/DL (ref 8.6–10.5)
CHLORIDE SERPL-SCNC: 99 MMOL/L (ref 98–107)
CO2 SERPL-SCNC: 22 MMOL/L (ref 22–29)
CREAT BLD-MCNC: 1.04 MG/DL (ref 0.76–1.27)
D DIMER PPP FEU-MCNC: 1.94 MCGFEU/ML (ref 0–0.49)
DEPRECATED RDW RBC AUTO: 43.7 FL (ref 37–54)
EOSINOPHIL # BLD AUTO: 0.65 10*3/MM3 (ref 0–0.7)
EOSINOPHIL NFR BLD AUTO: 7.1 % (ref 0.3–6.2)
ERYTHROCYTE [DISTWIDTH] IN BLOOD BY AUTOMATED COUNT: 13.7 % (ref 11.5–14.5)
GFR SERPL CREATININE-BSD FRML MDRD: 102 ML/MIN/1.73
GLOBULIN UR ELPH-MCNC: 3.7 GM/DL
GLUCOSE BLD-MCNC: 95 MG/DL (ref 65–99)
HCT VFR BLD AUTO: 44 % (ref 40.4–52.2)
HETEROPH AB SER QL LA: NEGATIVE
HGB BLD-MCNC: 15 G/DL (ref 13.7–17.6)
IMM GRANULOCYTES # BLD: 0.02 10*3/MM3 (ref 0–0.03)
IMM GRANULOCYTES NFR BLD: 0.2 % (ref 0–0.5)
LYMPHOCYTES # BLD AUTO: 1.4 10*3/MM3 (ref 0.9–4.8)
LYMPHOCYTES NFR BLD AUTO: 15.3 % (ref 19.6–45.3)
MCH RBC QN AUTO: 30 PG (ref 27–32.7)
MCHC RBC AUTO-ENTMCNC: 34.1 G/DL (ref 32.6–36.4)
MCV RBC AUTO: 88 FL (ref 79.8–96.2)
MONOCYTES # BLD AUTO: 1.54 10*3/MM3 (ref 0.2–1.2)
MONOCYTES NFR BLD AUTO: 16.8 % (ref 5–12)
NEUTROPHILS # BLD AUTO: 5.53 10*3/MM3 (ref 1.9–8.1)
NEUTROPHILS NFR BLD AUTO: 60.2 % (ref 42.7–76)
PLATELET # BLD AUTO: 195 10*3/MM3 (ref 140–500)
PMV BLD AUTO: 11.3 FL (ref 6–12)
POTASSIUM BLD-SCNC: 3.5 MMOL/L (ref 3.5–5.2)
PROT SERPL-MCNC: 7.7 G/DL (ref 6–8.5)
RBC # BLD AUTO: 5 10*6/MM3 (ref 4.6–6)
S PYO AG THROAT QL: NEGATIVE
SODIUM BLD-SCNC: 134 MMOL/L (ref 136–145)
WBC NRBC COR # BLD: 9.18 10*3/MM3 (ref 4.5–10.7)

## 2017-07-06 PROCEDURE — 99284 EMERGENCY DEPT VISIT MOD MDM: CPT

## 2017-07-06 PROCEDURE — 85025 COMPLETE CBC W/AUTO DIFF WBC: CPT | Performed by: PHYSICIAN ASSISTANT

## 2017-07-06 PROCEDURE — 0 IOPAMIDOL PER 1 ML: Performed by: EMERGENCY MEDICINE

## 2017-07-06 PROCEDURE — 86308 HETEROPHILE ANTIBODY SCREEN: CPT | Performed by: PHYSICIAN ASSISTANT

## 2017-07-06 PROCEDURE — 85379 FIBRIN DEGRADATION QUANT: CPT | Performed by: PHYSICIAN ASSISTANT

## 2017-07-06 PROCEDURE — 71275 CT ANGIOGRAPHY CHEST: CPT

## 2017-07-06 PROCEDURE — 87081 CULTURE SCREEN ONLY: CPT | Performed by: PHYSICIAN ASSISTANT

## 2017-07-06 PROCEDURE — 87880 STREP A ASSAY W/OPTIC: CPT | Performed by: PHYSICIAN ASSISTANT

## 2017-07-06 PROCEDURE — 71020 HC CHEST PA AND LATERAL: CPT

## 2017-07-06 PROCEDURE — 94799 UNLISTED PULMONARY SVC/PX: CPT

## 2017-07-06 PROCEDURE — 93005 ELECTROCARDIOGRAM TRACING: CPT | Performed by: PHYSICIAN ASSISTANT

## 2017-07-06 PROCEDURE — 80053 COMPREHEN METABOLIC PANEL: CPT | Performed by: PHYSICIAN ASSISTANT

## 2017-07-06 RX ORDER — IBUPROFEN 400 MG/1
400 TABLET ORAL ONCE
Status: COMPLETED | OUTPATIENT
Start: 2017-07-06 | End: 2017-07-06

## 2017-07-06 RX ORDER — SODIUM CHLORIDE 0.9 % (FLUSH) 0.9 %
10 SYRINGE (ML) INJECTION AS NEEDED
Status: DISCONTINUED | OUTPATIENT
Start: 2017-07-06 | End: 2017-07-06 | Stop reason: HOSPADM

## 2017-07-06 RX ORDER — LEVOFLOXACIN 750 MG/1
750 TABLET ORAL DAILY
Qty: 5 TABLET | Refills: 0 | Status: SHIPPED | OUTPATIENT
Start: 2017-07-06 | End: 2017-07-11

## 2017-07-06 RX ORDER — ALBUTEROL SULFATE 2.5 MG/3ML
2.5 SOLUTION RESPIRATORY (INHALATION) ONCE
Status: COMPLETED | OUTPATIENT
Start: 2017-07-06 | End: 2017-07-06

## 2017-07-06 RX ADMIN — ALBUTEROL SULFATE 2.5 MG: 2.5 SOLUTION RESPIRATORY (INHALATION) at 16:20

## 2017-07-06 RX ADMIN — IBUPROFEN 400 MG: 400 TABLET ORAL at 17:54

## 2017-07-06 RX ADMIN — IOPAMIDOL 95 ML: 755 INJECTION, SOLUTION INTRAVENOUS at 19:29

## 2017-07-06 NOTE — ED PROVIDER NOTES
EMERGENCY DEPARTMENT ENCOUNTER    CHIEF COMPLAINT  Chief Complaint: sore throat  History given by: pt   History limited by: none   Room Number: 30/30  PMD: Diane De Jesus MD      HPI:  Pt is a 31 y.o. male who presents with a sore throat for the past 2 days. Pt also c/o sharp CP, cough for 4 days, hot and cold flashes, and diarrhea for 2 days. Pt states that coughing makes his CP worse. Pt denies SOA, fever. Pt states that he is compliant with is medications. Pt states that he has taken nyquil and dayquil with no relief.     Duration: 2 days  Timing: constant   Location: throat   Radiation: none stated   Quality: pain  Intensity/Severity: moderate   Progression: unchanged   Associated Symptoms:CP, cough, hot and cold flashes, diarrhea   Aggravating Factors: none stated   Alleviating Factors: none stated   Previous Episodes:Hx asthma, COPD   Treatment before arrival: dayquil, nyquil     MEDICAL RECORD REVIEW  Hx of asthma, pt had a recent admission from 6/1-6/4 for status asthmaticus and was treated with steroids and bronchodilators, but no antibiotics.     PAST MEDICAL HISTORY  Active Ambulatory Problems     Diagnosis Date Noted   • Severe persistent asthma with status asthmaticus 06/01/2017     Resolved Ambulatory Problems     Diagnosis Date Noted   • No Resolved Ambulatory Problems     Past Medical History:   Diagnosis Date   • Asthma    • COPD (chronic obstructive pulmonary disease)    • Hypertension    • Migraine        PAST SURGICAL HISTORY  History reviewed. No pertinent surgical history.    FAMILY HISTORY  Family History   Problem Relation Age of Onset   • Diabetes Mother        SOCIAL HISTORY  Social History     Social History   • Marital status: Single     Spouse name: N/A   • Number of children: N/A   • Years of education: N/A     Occupational History   • Not on file.     Social History Main Topics   • Smoking status: Former Smoker     Packs/day: 1.00     Years: 10.00     Types: Cigarettes     Quit  date: 6/1/2013   • Smokeless tobacco: Not on file   • Alcohol use No   • Drug use: Yes     Special: Marijuana      Comment: occassionally   • Sexual activity: Defer     Other Topics Concern   • Not on file     Social History Narrative       ALLERGIES  Review of patient's allergies indicates no known allergies.    REVIEW OF SYSTEMS  Review of Systems   Constitutional: Positive for chills (and hot flashes). Negative for activity change, appetite change and fever.   HENT: Positive for sore throat. Negative for congestion.    Eyes: Negative.    Respiratory: Positive for cough. Negative for shortness of breath.    Cardiovascular: Positive for chest pain. Negative for leg swelling.   Gastrointestinal: Positive for diarrhea. Negative for abdominal pain and vomiting.   Endocrine: Negative.    Genitourinary: Negative for decreased urine volume and dysuria.   Musculoskeletal: Negative for neck pain.   Skin: Negative for rash and wound.   Allergic/Immunologic: Negative.    Neurological: Negative for weakness, numbness and headaches.   Hematological: Negative.    Psychiatric/Behavioral: Negative.    All other systems reviewed and are negative.      PHYSICAL EXAM  ED Triage Vitals   Temp Heart Rate Resp BP SpO2   07/06/17 1550 07/06/17 1550 07/06/17 1550 -- 07/06/17 1552   99.9 °F (37.7 °C) 125 18  99 %      Temp src Heart Rate Source Patient Position BP Location FiO2 (%)   07/06/17 1550 07/06/17 1550 -- -- --   Tympanic Monitor          Physical Exam   Constitutional: He is oriented to person, place, and time and well-developed, well-nourished, and in no distress. No distress.   HENT:   Head: Normocephalic and atraumatic.   Mouth/Throat: Uvula is midline and oropharynx is clear and moist. No posterior oropharyngeal erythema.   Eyes: EOM are normal. Pupils are equal, round, and reactive to light.   Neck: Normal range of motion. Neck supple.   Cardiovascular: Regular rhythm and normal heart sounds.  Tachycardia present.    Pulse  112   Pulmonary/Chest: Effort normal. No respiratory distress. He has wheezes in the right upper field and the left upper field. He exhibits tenderness (sternal).   Abdominal: Soft. He exhibits no distension. There is no tenderness. There is no rebound and no guarding.   Musculoskeletal: Normal range of motion. He exhibits no edema.   Lymphadenopathy:     He has cervical adenopathy (bilateral).   Neurological: He is alert and oriented to person, place, and time.   Skin: Skin is warm and dry. No rash noted. No pallor.   Psychiatric: Mood, memory, affect and judgment normal.   Nursing note and vitals reviewed.      LAB RESULTS  Recent Results (from the past 24 hour(s))   Comprehensive Metabolic Panel    Collection Time: 07/06/17  5:03 PM   Result Value Ref Range    Glucose 95 65 - 99 mg/dL    BUN 9 6 - 20 mg/dL    Creatinine 1.04 0.76 - 1.27 mg/dL    Sodium 134 (L) 136 - 145 mmol/L    Potassium 3.5 3.5 - 5.2 mmol/L    Chloride 99 98 - 107 mmol/L    CO2 22.0 22.0 - 29.0 mmol/L    Calcium 8.8 8.6 - 10.5 mg/dL    Total Protein 7.7 6.0 - 8.5 g/dL    Albumin 4.00 3.50 - 5.20 g/dL    ALT (SGPT) 18 1 - 41 U/L    AST (SGOT) 20 1 - 40 U/L    Alkaline Phosphatase 60 39 - 117 U/L    Total Bilirubin 0.8 0.1 - 1.2 mg/dL    eGFR  African Amer 102 >60 mL/min/1.73    Globulin 3.7 gm/dL    A/G Ratio 1.1 g/dL    BUN/Creatinine Ratio 8.7 7.0 - 25.0    Anion Gap 13.0 mmol/L   D-dimer, Quantitative    Collection Time: 07/06/17  5:03 PM   Result Value Ref Range    D-Dimer, Quantitative 1.94 (H) 0.00 - 0.49 MCGFEU/mL   Mononucleosis Screen    Collection Time: 07/06/17  5:03 PM   Result Value Ref Range    Monospot Negative Negative   CBC Auto Differential    Collection Time: 07/06/17  5:03 PM   Result Value Ref Range    WBC 9.18 4.50 - 10.70 10*3/mm3    RBC 5.00 4.60 - 6.00 10*6/mm3    Hemoglobin 15.0 13.7 - 17.6 g/dL    Hematocrit 44.0 40.4 - 52.2 %    MCV 88.0 79.8 - 96.2 fL    MCH 30.0 27.0 - 32.7 pg    MCHC 34.1 32.6 - 36.4 g/dL    RDW  13.7 11.5 - 14.5 %    RDW-SD 43.7 37.0 - 54.0 fl    MPV 11.3 6.0 - 12.0 fL    Platelets 195 140 - 500 10*3/mm3    Neutrophil % 60.2 42.7 - 76.0 %    Lymphocyte % 15.3 (L) 19.6 - 45.3 %    Monocyte % 16.8 (H) 5.0 - 12.0 %    Eosinophil % 7.1 (H) 0.3 - 6.2 %    Basophil % 0.4 0.0 - 1.5 %    Immature Grans % 0.2 0.0 - 0.5 %    Neutrophils, Absolute 5.53 1.90 - 8.10 10*3/mm3    Lymphocytes, Absolute 1.40 0.90 - 4.80 10*3/mm3    Monocytes, Absolute 1.54 (H) 0.20 - 1.20 10*3/mm3    Eosinophils, Absolute 0.65 0.00 - 0.70 10*3/mm3    Basophils, Absolute 0.04 0.00 - 0.20 10*3/mm3    Immature Grans, Absolute 0.02 0.00 - 0.03 10*3/mm3   Rapid Strep A Screen    Collection Time: 07/06/17  5:04 PM   Result Value Ref Range    Strep A Ag Negative Negative       I ordered the above labs and reviewed the results    RADIOLOGY  CT Angiogram Chest With Contrast   Preliminary Result   1.  No evidence of pulmonary embolus.   2.  Right hilar lymphadenopathy.   3.  Mild bilateral bronchiectasis with micronodular infiltrate in both   lungs, most severe in the right upper lobe. Please correlate for signs   of acute pneumonia. Follow-up CT of the chest also recommended.          XR Chest 2 View   Preliminary Result   Possible mild right upper lobe pneumonia.              I ordered the above noted radiological studies and reviewed the images on the PACS system.      EKG  EKG           EKG time: 1649  Rhythm/Rate: sinus rhythm 92  P waves and WV: normal  QRS, axis: normal  ST and T waves: normal     Interpreted Contemporaneously by me, independently viewed    PROCEDURES      COURSE & MEDICAL DECISION MAKING  Pertinent Labs and Imaging studies that were ordered and reviewed are noted above.  Results were reviewed/discussed with the patient and they were also made aware of online assess.  Pt also made aware that some labs, such as cultures, will not be resulted during ER visit and follow up with PMD is necessary.       PROGRESS AND  CONSULTS    Progress Notes:  1624  Ordered CXR, EKG, and labs for further evaluation and proventil for breathing.     1648  The pt is PERC negative.     1708  Reviewed pt's history and workup with Dr. Oconnell.  After a bedside evaluation; Dr Oconnell agrees with the plan of care.    1718  Ordered advil for pain.    1720  Ordered labs for further evaluation.     1738  Pt has an elevated D-dimer. Will order a CTA chest.     1750  Updated Dr. Oconnell on the pt.     1804  As per the RN, the pt's O2 sats have dropped to the 80s on room air.     2020  Reviewed the pt's CTs and CTAs.     2033  As per the RN, the pt's O2 sats dropped to 90 on ambulation, but returned with rest.     2042  Rechecked pt who is resting comfortably. Discussed the pt's labs and radiology results, showing pneumonia. Offered the pt admission to the hospital.  Pt declines admission, and is resting with O2 sats of 94% and in NAD. I feel patient is a good candidate for oral antibiotics and close followup.  Plan to d/c pt with oral antibiotics. He is to see his family physician next week for recheck.  Pt understands and agrees with the plan, and all questions were answered.     The patient's history, physical exam, and lab findings were discussed with the physician, who also performed a face to face history and physical exam.  I discussed all results and noted any abnormalities with patient.  Discussed absoute need to recheck abnormalities with their family physician.  I answered any of the patient's questions.  Discussed plan for discharge, as there is no emergent indication for admission.  Pt is agreeable and understands need for follow up and repeat testing.  Pt is aware that discharge does not mean that nothing is wrong but it indicates no emergency is present and they must continue care with their family physician.  Pt is discharged with instructions to follow up with primary care doctor to have their blood pressure rechecked.     MEDICATIONS GIVEN IN  "ER  Medications   sodium chloride 0.9 % flush 10 mL (not administered)   albuterol (PROVENTIL) nebulizer solution 0.083% 2.5 mg/3mL (2.5 mg Nebulization Given 7/6/17 1620)   ibuprofen (ADVIL,MOTRIN) tablet 400 mg (400 mg Oral Given 7/6/17 1754)   iopamidol (ISOVUE-370) 76 % injection 100 mL (95 mL Intravenous Given 7/6/17 1929)       /99  Pulse 99  Temp (!) 100.7 °F (38.2 °C) (Tympanic)   Resp 18  Ht 69\" (175.3 cm)  Wt 175 lb (79.4 kg)  SpO2 96%  BMI 25.84 kg/m2      DIAGNOSIS  Final diagnoses:   Hospital-acquired pneumonia   History of asthma       FOLLOW UP   Diane De Jesus MD  [facility] [address]  [phone]            RX     Medication List      New Prescriptions          levoFLOXacin 750 MG tablet   Commonly known as:  LEVAQUIN   Take 1 tablet by mouth Daily for 5 days.         Stop          lisinopril 5 MG tablet   Commonly known as:  PRINIVIL,ZESTRIL       LISINOPRIL PO       predniSONE 20 MG tablet   Commonly known as:  DELTASONE           I personally scribed for Caryl Ricks PA-C on 7/6/2017 at 8:49 PM.  Electronically signed by Bryan Dexter on 7/6/2017 at time 8:49 PM.     Bryan Dexter  07/06/17 2049       Caryl Ricks PA-C  07/06/17 2054    "

## 2017-07-06 NOTE — ED PROVIDER NOTES
The patient presents complaining of sore throat onset 2 days ago. Pt reports CP, cough, and diarrhea. The pt states he is compliant with his medication.     Limited physical exam:  Patient is nontoxic appearing and in mild distress. The pt is alert and oriented X 3. The pt's oropharynx is within normal limits. Normal voice  Lungs/cardiovascular: The pt's heart is RRR. The pt has good air movement with occasional rhonchi.  Abdomen: The pt's abd is soft and nontender.  Back/extremities: The pt has no swelling to the extremities. The pt has intact distal pulses.      I supervised care provided by the midlevel provider.  We have discussed this patient's history, physical exam, and treatment plan.  I have reviewed the note and personally saw and examined the patient and agree with the plan of care.    CAROLINE Gay  07/06/17 [id number]       Sofia cOonnell MD  07/08/17 [id number]

## 2017-07-06 NOTE — ED NOTES
Pt moved to ER #[id number] via stretcher for ease of monitoring, pt request/comfort.  Moved per CATALINO Hutson and ANTOINE Harden.  Awaiting CTA chest.     Ivon Rodriguez RN  07/06/17 [id number]

## 2017-07-07 NOTE — DISCHARGE INSTRUCTIONS
PLEASE READ AND REVIEW ALL DISCHARGE INSTRUCTIONS.     Please follow up with your primary care physician for any further evaluation/treatment and further management of your blood pressure.     Recheck in emergency department for any worsening and/or concerning symptoms.     Take all prescribed medicine as written and continue chronic medication.

## 2017-07-08 LAB — BACTERIA SPEC AEROBE CULT: NORMAL

## 2017-09-17 ENCOUNTER — HOSPITAL ENCOUNTER (INPATIENT)
Facility: HOSPITAL | Age: 32
LOS: 2 days | Discharge: HOME | End: 2017-09-19
Attending: EMERGENCY MEDICINE | Admitting: INTERNAL MEDICINE

## 2017-09-17 ENCOUNTER — APPOINTMENT (OUTPATIENT)
Dept: GENERAL RADIOLOGY | Facility: HOSPITAL | Age: 32
End: 2017-09-17

## 2017-09-17 DIAGNOSIS — R09.02 HYPOXIA: ICD-10-CM

## 2017-09-17 DIAGNOSIS — J44.1 COPD EXACERBATION (HCC): Primary | ICD-10-CM

## 2017-09-17 PROBLEM — I10 BENIGN ESSENTIAL HTN: Status: ACTIVE | Noted: 2017-09-17

## 2017-09-17 PROBLEM — J96.01 ACUTE RESPIRATORY FAILURE WITH HYPOXIA: Status: ACTIVE | Noted: 2017-09-17

## 2017-09-17 LAB
ANION GAP SERPL CALCULATED.3IONS-SCNC: 13.5 MMOL/L
BASOPHILS # BLD AUTO: 0.03 10*3/MM3 (ref 0–0.2)
BASOPHILS NFR BLD AUTO: 0.4 % (ref 0–1.5)
BUN BLD-MCNC: 9 MG/DL (ref 6–20)
BUN/CREAT SERPL: 9.2 (ref 7–25)
CALCIUM SPEC-SCNC: 9.3 MG/DL (ref 8.6–10.5)
CHLORIDE SERPL-SCNC: 104 MMOL/L (ref 98–107)
CO2 SERPL-SCNC: 23.5 MMOL/L (ref 22–29)
CREAT BLD-MCNC: 0.98 MG/DL (ref 0.76–1.27)
DEPRECATED RDW RBC AUTO: 44.8 FL (ref 37–54)
EOSINOPHIL # BLD AUTO: 0.79 10*3/MM3 (ref 0–0.7)
EOSINOPHIL NFR BLD AUTO: 11.6 % (ref 0.3–6.2)
ERYTHROCYTE [DISTWIDTH] IN BLOOD BY AUTOMATED COUNT: 13.7 % (ref 11.5–14.5)
GFR SERPL CREATININE-BSD FRML MDRD: 109 ML/MIN/1.73
GLUCOSE BLD-MCNC: 95 MG/DL (ref 65–99)
HCT VFR BLD AUTO: 43 % (ref 40.4–52.2)
HGB BLD-MCNC: 14.4 G/DL (ref 13.7–17.6)
IMM GRANULOCYTES # BLD: 0 10*3/MM3 (ref 0–0.03)
IMM GRANULOCYTES NFR BLD: 0 % (ref 0–0.5)
LYMPHOCYTES # BLD AUTO: 2 10*3/MM3 (ref 0.9–4.8)
LYMPHOCYTES NFR BLD AUTO: 29.4 % (ref 19.6–45.3)
MCH RBC QN AUTO: 29.9 PG (ref 27–32.7)
MCHC RBC AUTO-ENTMCNC: 33.5 G/DL (ref 32.6–36.4)
MCV RBC AUTO: 89.4 FL (ref 79.8–96.2)
MONOCYTES # BLD AUTO: 0.67 10*3/MM3 (ref 0.2–1.2)
MONOCYTES NFR BLD AUTO: 9.8 % (ref 5–12)
NEUTROPHILS # BLD AUTO: 3.32 10*3/MM3 (ref 1.9–8.1)
NEUTROPHILS NFR BLD AUTO: 48.8 % (ref 42.7–76)
PLATELET # BLD AUTO: 282 10*3/MM3 (ref 140–500)
PMV BLD AUTO: 10.8 FL (ref 6–12)
POTASSIUM BLD-SCNC: 4.1 MMOL/L (ref 3.5–5.2)
RBC # BLD AUTO: 4.81 10*6/MM3 (ref 4.6–6)
SODIUM BLD-SCNC: 141 MMOL/L (ref 136–145)
WBC NRBC COR # BLD: 6.81 10*3/MM3 (ref 4.5–10.7)

## 2017-09-17 PROCEDURE — 85025 COMPLETE CBC W/AUTO DIFF WBC: CPT | Performed by: EMERGENCY MEDICINE

## 2017-09-17 PROCEDURE — 80048 BASIC METABOLIC PNL TOTAL CA: CPT | Performed by: EMERGENCY MEDICINE

## 2017-09-17 PROCEDURE — 94640 AIRWAY INHALATION TREATMENT: CPT

## 2017-09-17 PROCEDURE — 25010000002 ENOXAPARIN PER 10 MG: Performed by: INTERNAL MEDICINE

## 2017-09-17 PROCEDURE — 94799 UNLISTED PULMONARY SVC/PX: CPT

## 2017-09-17 PROCEDURE — 25010000002 METHYLPREDNISOLONE PER 125 MG: Performed by: INTERNAL MEDICINE

## 2017-09-17 PROCEDURE — 25010000002 METHYLPREDNISOLONE PER 125 MG: Performed by: EMERGENCY MEDICINE

## 2017-09-17 PROCEDURE — 99284 EMERGENCY DEPT VISIT MOD MDM: CPT

## 2017-09-17 PROCEDURE — 71020 HC CHEST PA AND LATERAL: CPT

## 2017-09-17 RX ORDER — MONTELUKAST SODIUM 10 MG/1
10 TABLET ORAL NIGHTLY
Status: DISCONTINUED | OUTPATIENT
Start: 2017-09-17 | End: 2017-09-19 | Stop reason: HOSPADM

## 2017-09-17 RX ORDER — METHYLPREDNISOLONE SODIUM SUCCINATE 125 MG/2ML
60 INJECTION, POWDER, LYOPHILIZED, FOR SOLUTION INTRAMUSCULAR; INTRAVENOUS EVERY 6 HOURS
Status: DISCONTINUED | OUTPATIENT
Start: 2017-09-17 | End: 2017-09-18

## 2017-09-17 RX ORDER — NITROGLYCERIN 0.4 MG/1
0.4 TABLET SUBLINGUAL
Status: DISCONTINUED | OUTPATIENT
Start: 2017-09-17 | End: 2017-09-19 | Stop reason: HOSPADM

## 2017-09-17 RX ORDER — SODIUM CHLORIDE 0.9 % (FLUSH) 0.9 %
10 SYRINGE (ML) INJECTION AS NEEDED
Status: DISCONTINUED | OUTPATIENT
Start: 2017-09-17 | End: 2017-09-19 | Stop reason: HOSPADM

## 2017-09-17 RX ORDER — ONDANSETRON 2 MG/ML
4 INJECTION INTRAMUSCULAR; INTRAVENOUS EVERY 6 HOURS PRN
Status: DISCONTINUED | OUTPATIENT
Start: 2017-09-17 | End: 2017-09-19 | Stop reason: HOSPADM

## 2017-09-17 RX ORDER — IPRATROPIUM BROMIDE AND ALBUTEROL SULFATE 2.5; .5 MG/3ML; MG/3ML
3 SOLUTION RESPIRATORY (INHALATION)
Status: DISCONTINUED | OUTPATIENT
Start: 2017-09-17 | End: 2017-09-19 | Stop reason: HOSPADM

## 2017-09-17 RX ORDER — ONDANSETRON 4 MG/1
4 TABLET, FILM COATED ORAL EVERY 6 HOURS PRN
Status: DISCONTINUED | OUTPATIENT
Start: 2017-09-17 | End: 2017-09-19 | Stop reason: HOSPADM

## 2017-09-17 RX ORDER — IPRATROPIUM BROMIDE AND ALBUTEROL SULFATE 2.5; .5 MG/3ML; MG/3ML
3 SOLUTION RESPIRATORY (INHALATION) EVERY 4 HOURS PRN
Status: DISCONTINUED | OUTPATIENT
Start: 2017-09-17 | End: 2017-09-19 | Stop reason: HOSPADM

## 2017-09-17 RX ORDER — BUDESONIDE AND FORMOTEROL FUMARATE DIHYDRATE 160; 4.5 UG/1; UG/1
2 AEROSOL RESPIRATORY (INHALATION)
Status: DISCONTINUED | OUTPATIENT
Start: 2017-09-17 | End: 2017-09-19 | Stop reason: HOSPADM

## 2017-09-17 RX ORDER — IPRATROPIUM BROMIDE AND ALBUTEROL SULFATE 2.5; .5 MG/3ML; MG/3ML
3 SOLUTION RESPIRATORY (INHALATION) ONCE
Status: COMPLETED | OUTPATIENT
Start: 2017-09-17 | End: 2017-09-17

## 2017-09-17 RX ORDER — IPRATROPIUM BROMIDE AND ALBUTEROL SULFATE 2.5; .5 MG/3ML; MG/3ML
3 SOLUTION RESPIRATORY (INHALATION) ONCE
Status: DISCONTINUED | OUTPATIENT
Start: 2017-09-17 | End: 2017-09-19 | Stop reason: HOSPADM

## 2017-09-17 RX ORDER — METHYLPREDNISOLONE SODIUM SUCCINATE 125 MG/2ML
125 INJECTION, POWDER, LYOPHILIZED, FOR SOLUTION INTRAMUSCULAR; INTRAVENOUS ONCE
Status: COMPLETED | OUTPATIENT
Start: 2017-09-17 | End: 2017-09-17

## 2017-09-17 RX ORDER — SODIUM CHLORIDE 0.9 % (FLUSH) 0.9 %
1-10 SYRINGE (ML) INJECTION AS NEEDED
Status: DISCONTINUED | OUTPATIENT
Start: 2017-09-17 | End: 2017-09-19 | Stop reason: HOSPADM

## 2017-09-17 RX ORDER — ALBUTEROL SULFATE 2.5 MG/3ML
2.5 SOLUTION RESPIRATORY (INHALATION) ONCE
Status: COMPLETED | OUTPATIENT
Start: 2017-09-17 | End: 2017-09-17

## 2017-09-17 RX ORDER — CODEINE PHOSPHATE AND GUAIFENESIN 10; 100 MG/5ML; MG/5ML
5 SOLUTION ORAL EVERY 6 HOURS PRN
Status: DISCONTINUED | OUTPATIENT
Start: 2017-09-17 | End: 2017-09-19 | Stop reason: HOSPADM

## 2017-09-17 RX ORDER — ONDANSETRON 4 MG/1
4 TABLET, ORALLY DISINTEGRATING ORAL EVERY 6 HOURS PRN
Status: DISCONTINUED | OUTPATIENT
Start: 2017-09-17 | End: 2017-09-19 | Stop reason: HOSPADM

## 2017-09-17 RX ORDER — ACETAMINOPHEN 325 MG/1
650 TABLET ORAL EVERY 4 HOURS PRN
Status: DISCONTINUED | OUTPATIENT
Start: 2017-09-17 | End: 2017-09-19 | Stop reason: HOSPADM

## 2017-09-17 RX ADMIN — ENOXAPARIN SODIUM 40 MG: 40 INJECTION SUBCUTANEOUS at 16:53

## 2017-09-17 RX ADMIN — METHYLPREDNISOLONE SODIUM SUCCINATE 60 MG: 125 INJECTION, POWDER, FOR SOLUTION INTRAMUSCULAR; INTRAVENOUS at 22:42

## 2017-09-17 RX ADMIN — IPRATROPIUM BROMIDE AND ALBUTEROL SULFATE 3 ML: .5; 3 SOLUTION RESPIRATORY (INHALATION) at 11:14

## 2017-09-17 RX ADMIN — IPRATROPIUM BROMIDE AND ALBUTEROL SULFATE 3 ML: .5; 3 SOLUTION RESPIRATORY (INHALATION) at 15:11

## 2017-09-17 RX ADMIN — ALBUTEROL SULFATE 2.5 MG: 2.5 SOLUTION RESPIRATORY (INHALATION) at 11:52

## 2017-09-17 RX ADMIN — METHYLPREDNISOLONE SODIUM SUCCINATE 60 MG: 125 INJECTION, POWDER, FOR SOLUTION INTRAMUSCULAR; INTRAVENOUS at 16:54

## 2017-09-17 RX ADMIN — IPRATROPIUM BROMIDE AND ALBUTEROL SULFATE 3 ML: .5; 3 SOLUTION RESPIRATORY (INHALATION) at 19:42

## 2017-09-17 RX ADMIN — METHYLPREDNISOLONE SODIUM SUCCINATE 125 MG: 125 INJECTION, POWDER, FOR SOLUTION INTRAMUSCULAR; INTRAVENOUS at 11:15

## 2017-09-17 RX ADMIN — IPRATROPIUM BROMIDE AND ALBUTEROL SULFATE 3 ML: .5; 3 SOLUTION RESPIRATORY (INHALATION) at 23:48

## 2017-09-17 RX ADMIN — BUDESONIDE AND FORMOTEROL FUMARATE DIHYDRATE 2 PUFF: 160; 4.5 AEROSOL RESPIRATORY (INHALATION) at 19:42

## 2017-09-17 RX ADMIN — MONTELUKAST 10 MG: 10 TABLET, FILM COATED ORAL at 22:42

## 2017-09-17 NOTE — PLAN OF CARE
Problem: Patient Care Overview (Adult)  Goal: Plan of Care Review  Outcome: Ongoing (interventions implemented as appropriate)  Goal: Discharge Needs Assessment  Outcome: Ongoing (interventions implemented as appropriate)    Problem: Respiratory Insufficiency (Adult)  Goal: Identify Related Risk Factors and Signs and Symptoms  Outcome: Ongoing (interventions implemented as appropriate)  Goal: Acid/Base Balance  Outcome: Ongoing (interventions implemented as appropriate)  Goal: Effective Ventilation  Outcome: Ongoing (interventions implemented as appropriate)    Problem: Activity Intolerance (Adult)  Goal: Identify Related Risk Factors and Signs and Symptoms  Outcome: Ongoing (interventions implemented as appropriate)  Goal: Activity Tolerance  Outcome: Ongoing (interventions implemented as appropriate)  Goal: Effective Energy Conservation Techniques  Outcome: Ongoing (interventions implemented as appropriate)    Problem: Fall Risk (Adult)  Goal: Identify Related Risk Factors and Signs and Symptoms  Outcome: Ongoing (interventions implemented as appropriate)  Goal: Absence of Falls  Outcome: Ongoing (interventions implemented as appropriate)

## 2017-09-17 NOTE — H&P
Patient Care Team:  Diane De Jesus MD as PCP - General (Internal Medicine)    Chief complaint: soa    Subjective     COPD   Associated symptoms include coughing (slight).   32 yo with history of asthma. He's had previous hospitalizations at [facility] with intubation ×3 in the past. Was admitted to [facility] ICU on 6/1/17 for asthma exacerbation and discharged on 6/4/17. Also of note he was treated for a PNA as an outpatient within the past 2 weeks and just finished his antibiotics.    Presents with SOA. Started at 8PM last night. Took multiple breathing treatments at home but not enough improvement. Came to [facility] ER. GIven IV steroids and bronchodilators in the ER which helped some but still wheezing and hypoxia so will be admitted.    Desats to 89% off oxygen so was placed on oxygen. Feels a little better.     Review of Systems   Constitutional: Negative.    HENT: Negative.    Eyes: Negative.    Respiratory: Positive for cough (slight) and shortness of breath.    Cardiovascular: Negative.    Gastrointestinal: Negative.    Endocrine: Negative.    Genitourinary: Negative.    Skin: Negative.    Allergic/Immunologic: Negative.    Neurological: Negative.    Hematological: Negative.    Psychiatric/Behavioral: Negative.         Past Medical History:   Diagnosis Date   • Asthma    • COPD (chronic obstructive pulmonary disease)    • Hypertension    • Migraine      History reviewed. No pertinent surgical history.  Family History   Problem Relation Age of Onset   • Diabetes Mother      Social History   Substance Use Topics   • Smoking status: Former Smoker     Packs/day: 1.00     Years: 10.00     Types: Cigarettes     Quit date: 6/1/2013   • Smokeless tobacco: None   • Alcohol use No     Prescriptions Prior to Admission   Medication Sig Dispense Refill Last Dose   • budesonide-formoterol (SYMBICORT) 160-4.5 MCG/ACT inhaler Inhale 2 puffs 2 (Two) Times a Day.   9/17/2017 at Unknown time   • ipratropium-albuterol (COMBIVENT  RESPIMAT)  MCG/ACT inhaler Inhale 1 puff 4 (Four) Times a Day As Needed for Wheezing.   9/17/2017 at Unknown time   • ipratropium-albuterol (DUO-NEB) 0.5-2.5 mg/mL nebulizer Take 3 mL by nebulization Every 4 (Four) Hours As Needed for Shortness of Air. 360 mL 0 9/17/2017 at Unknown time   • Montelukast Sodium (SINGULAIR PO) Take  by mouth Daily.   9/16/2017 at Unknown time   • HYDROCHLOROTHIAZIDE PO Take  by mouth.   More than a month at Unknown time   • lisinopril (PRINIVIL,ZESTRIL) 5 MG tablet Take 5 mg by mouth Daily.   More than a month at Unknown time   • LISINOPRIL PO Take 5 mg by mouth Daily.      • predniSONE (DELTASONE) 20 MG tablet Take 3 tablets by mouth Daily. 15 tablet 0      Allergies:  Review of patient's allergies indicates no known allergies.    Objective      Vital Signs  Temp:  [98.2 °F (36.8 °C)-98.3 °F (36.8 °C)] 98.2 °F (36.8 °C)  Heart Rate:  [72-90] 90  Resp:  [16-20] 20  BP: (140-150)/() 148/94    Physical Exam   Constitutional: He is oriented to person, place, and time. He appears well-developed and well-nourished. No distress.   HENT:   Head: Normocephalic and atraumatic.   Mouth/Throat: Oropharynx is clear and moist.   Eyes: Conjunctivae and EOM are normal. No scleral icterus.   Neck: Normal range of motion. Neck supple. No JVD present.   Cardiovascular: Normal rate, regular rhythm and normal heart sounds.    No murmur heard.  Pulmonary/Chest: He is in respiratory distress (slight). He has wheezes (slight). He has rales (slight).   Abdominal: Soft. Bowel sounds are normal. There is no tenderness.   Genitourinary:   Genitourinary Comments: Deferred    Musculoskeletal: Normal range of motion. He exhibits no edema.   Neurological: He is alert and oriented to person, place, and time. No cranial nerve deficit. He exhibits normal muscle tone.   Skin: Skin is warm and dry. He is not diaphoretic.   Psychiatric: He has a normal mood and affect. His behavior is normal. Thought content  normal.   Nursing note and vitals reviewed.      Results Review:   I reviewed the patient's new clinical results.  XR Chest 2 View [[id number]] Not Reviewed       Order Status: Completed Collected: 09/17/17 1159      Updated: 09/17/17 1206     Narrative:       XR CHEST 2 VW-     HISTORY: Male who is 31 years-old, short of breath, asthma     TECHNIQUE: Frontal and lateral views of the chest     COMPARISON: 07/06/2017     FINDINGS: Heart, mediastinum and pulmonary vasculature are unremarkable.  No focal pulmonary consolidation, pleural effusion, or pneumothorax.  Mild pulmonary hyperinflation, as well as peribronchial thickening  (especially in the right upper lung) compatible with stated history of  asthma; appearance is similar to the prior exam. No acute osseous  process.        Impression:       No focal pulmonary consolidation. Persistent prominence of  the lung markings mild pulmonary hyperinflation.     Basic Metabolic Panel [[id number]] Collected: 09/17/17 1112        Lab Status: Final result Specimen: Blood Updated: 09/17/17 1143        Glucose 95 mg/dL         BUN 9 mg/dL         Creatinine 0.98 mg/dL         Sodium 141 mmol/L         Potassium 4.1 mmol/L         Chloride 104 mmol/L         CO2 23.5 mmol/L         Calcium 9.3 mg/dL         eGFR  African Amer 109 mL/min/1.73         BUN/Creatinine Ratio 9.2        Anion Gap 13.5 mmol/L        Narrative:         GFR Normal >60  Chronic Kidney Disease <60  Kidney Failure <15       CBC Auto Differential [[id number]] (Abnormal) Collected: 09/17/17 1112       Lab Status: Final result Specimen: Blood Updated: 09/17/17 1122        WBC 6.81 10*3/mm3         RBC 4.81 10*6/mm3         Hemoglobin 14.4 g/dL         Hematocrit 43.0 %         MCV 89.4 fL         MCH 29.9 pg         MCHC 33.5 g/dL         RDW 13.7 %         RDW-SD 44.8 fl         MPV 10.8 fL         Platelets 282 10*3/mm3         Neutrophil % 48.8 %         Lymphocyte % 29.4 %         Monocyte % 9.8 %          Eosinophil % 11.6 (H) %         Basophil % 0.4 %         Immature Grans % 0.0 %         Neutrophils, Absolute 3.32 10*3/mm3         Lymphocytes, Absolute 2.00 10*3/mm3         Monocytes, Absolute 0.67 10*3/mm3         Eosinophils, Absolute 0.79 (H) 10*3/mm3         Basophils, Absolute 0.03 10*3/mm3         Immature Grans, Absolute 0.00 10*3/mm3           Assessment/Plan     Active Problems:    COPD exacerbation    Acute respiratory failure with hypoxia    Benign essential HTN    -IV steroids  -O2 to keep sats above 90%  -Bronchodilators  -Continue most home medicines     Assessment & Plan    I discussed the patients findings and my recommendations with patient and consulting provider  Reviewed previous records  Dennis Ferguson MD  09/17/17  3:49 PM

## 2017-09-17 NOTE — ED PROVIDER NOTES
EMERGENCY DEPARTMENT ENCOUNTER    CHIEF COMPLAINT  Chief Complaint: SOB  History given by: patient  History limited by: nothing  Room Number: 13/13  PMD: Diane De Jesus MD  Pulmonology- Dr. Nava     HPI:  Pt is a 31 y.o. male with a history of COPD, who presents complaining of SOB since 0800 yesterday. Pt denies fever or hemoptysis but complains of a productive cough with white sputum. :Pt states that his symptoms are similar to his prior COPD exacerbations and that he last took steroids in June 2017. Pt states that he has taken 7 breathing treatments in the last 24 hours without relief of his SOB.     Duration:  1.5 days  Onset: gradual  Timing: constant  Location: generalized  Radiation: N/A  Quality: SOB  Intensity/Severity: moderate to severe  Progression: worsening  Associated Symptoms: productive cough  Aggravating Factors: none  Alleviating Factors: none  Previous Episodes: Pt states that his symptoms are similar to his prior COPD exacerbations.  Treatment before arrival: Pt states that he has taken 7 breathing treatments in the last 24 hours without relief of his SOB.     PAST MEDICAL HISTORY  Active Ambulatory Problems     Diagnosis Date Noted   • Severe persistent asthma with status asthmaticus 06/01/2017     Resolved Ambulatory Problems     Diagnosis Date Noted   • No Resolved Ambulatory Problems     Past Medical History:   Diagnosis Date   • Asthma    • COPD (chronic obstructive pulmonary disease)    • Hypertension    • Migraine        PAST SURGICAL HISTORY  History reviewed. No pertinent surgical history.    FAMILY HISTORY  Family History   Problem Relation Age of Onset   • Diabetes Mother        SOCIAL HISTORY  Social History     Social History   • Marital status: Single     Spouse name: N/A   • Number of children: N/A   • Years of education: N/A     Occupational History   • Not on file.     Social History Main Topics   • Smoking status: Former Smoker     Packs/day: 1.00     Years: 10.00     Types:  Cigarettes     Quit date: 6/1/2013   • Smokeless tobacco: Not on file   • Alcohol use No   • Drug use: Yes     Special: Marijuana      Comment: occassionally   • Sexual activity: Defer     Other Topics Concern   • Not on file     Social History Narrative       ALLERGIES  Review of patient's allergies indicates no known allergies.    REVIEW OF SYSTEMS  Review of Systems   Constitutional: Negative for activity change, appetite change and fever.   HENT: Negative for congestion and sore throat.    Eyes: Negative.    Respiratory: Positive for cough (productive) and shortness of breath.    Cardiovascular: Negative for chest pain and leg swelling.   Gastrointestinal: Negative for abdominal pain, diarrhea and vomiting.   Endocrine: Negative.    Genitourinary: Negative for decreased urine volume and dysuria.   Musculoskeletal: Negative for neck pain.   Skin: Negative for rash and wound.   Allergic/Immunologic: Negative.    Neurological: Negative for weakness, numbness and headaches.   Hematological: Negative.    Psychiatric/Behavioral: Negative.    All other systems reviewed and are negative.      PHYSICAL EXAM  ED Triage Vitals   Temp Heart Rate Resp BP SpO2   09/17/17 1049 09/17/17 1043 09/17/17 1044 09/17/17 1049 09/17/17 1043   98.3 °F (36.8 °C) 90 16 150/112 90 %      Temp src Heart Rate Source Patient Position BP Location FiO2 (%)   09/17/17 1049 09/17/17 1043 -- -- --   Oral Monitor          Physical Exam   Constitutional: He is oriented to person, place, and time and well-developed, well-nourished, and in no distress.   HENT:   Head: Normocephalic and atraumatic.   Eyes: EOM are normal. Pupils are equal, round, and reactive to light.   Neck: Normal range of motion. Neck supple.   Cardiovascular: Regular rhythm and normal heart sounds.  Tachycardia present.    Pulmonary/Chest: Effort normal. No respiratory distress. He has decreased breath sounds (diffusely). He has wheezes (diffusely). He has rhonchi (diffusely).    Pt is actively coughing.   Abdominal: Soft. There is no tenderness. There is no rebound and no guarding.   Musculoskeletal: Normal range of motion. He exhibits no edema.   Neurological: He is alert and oriented to person, place, and time. He has normal sensation and normal strength.   Skin: Skin is warm and dry.   Psychiatric: Mood and affect normal.   Nursing note and vitals reviewed.      LAB RESULTS  Lab Results (last 24 hours)     Procedure Component Value Units Date/Time    CBC & Differential [[id number]] Collected:  09/17/17 1112    Specimen:  Blood Updated:  09/17/17 1122    Narrative:       The following orders were created for panel order CBC & Differential.  Procedure                               Abnormality         Status                     ---------                               -----------         ------                     CBC Auto Differential[[id number]]        Abnormal            Final result                 Please view results for these tests on the individual orders.    Basic Metabolic Panel [[id number]] Collected:  09/17/17 1112    Specimen:  Blood Updated:  09/17/17 1143     Glucose 95 mg/dL      BUN 9 mg/dL      Creatinine 0.98 mg/dL      Sodium 141 mmol/L      Potassium 4.1 mmol/L      Chloride 104 mmol/L      CO2 23.5 mmol/L      Calcium 9.3 mg/dL      eGFR  African Amer 109 mL/min/1.73      BUN/Creatinine Ratio 9.2     Anion Gap 13.5 mmol/L     Narrative:       GFR Normal >60  Chronic Kidney Disease <60  Kidney Failure <15    CBC Auto Differential [[id number]]  (Abnormal) Collected:  09/17/17 1112    Specimen:  Blood Updated:  09/17/17 1122     WBC 6.81 10*3/mm3      RBC 4.81 10*6/mm3      Hemoglobin 14.4 g/dL      Hematocrit 43.0 %      MCV 89.4 fL      MCH 29.9 pg      MCHC 33.5 g/dL      RDW 13.7 %      RDW-SD 44.8 fl      MPV 10.8 fL      Platelets 282 10*3/mm3      Neutrophil % 48.8 %      Lymphocyte % 29.4 %      Monocyte % 9.8 %      Eosinophil % 11.6 (H) %      Basophil % 0.4 %       Immature Grans % 0.0 %      Neutrophils, Absolute 3.32 10*3/mm3      Lymphocytes, Absolute 2.00 10*3/mm3      Monocytes, Absolute 0.67 10*3/mm3      Eosinophils, Absolute 0.79 (H) 10*3/mm3      Basophils, Absolute 0.03 10*3/mm3      Immature Grans, Absolute 0.00 10*3/mm3           I ordered the above labs and reviewed the results    RADIOLOGY  XR Chest 2 View   Final Result   No focal pulmonary consolidation. Persistent prominence of   the lung markings mild pulmonary hyperinflation.       This report was finalized on 9/17/2017 12:03 PM by Dr. Gal Castellanos MD.               I ordered the above noted radiological studies. Interpreted by radiologist. Reviewed by me in PACS.       PROCEDURES  Procedures      PROGRESS AND CONSULTS  ED Course     1110- Ordered blood work and CXR for further evaluation. Ordered solu-medrol, duo-neb and albuterol breathing treatments for SOB.    1223- Rechecked pt. Pt states that his breathing has improved after the breathing treatments. On re-exam, the pt's lungs have increased air movement but continue to have diffuse decreased breath sounds and wheezing. Notified pt of his unremarkable lab and imaging results. Discussed the plan to observe the pt's oxygen saturations on room air. Pt agrees with the plan and all questions were addressed.    1227- Pt's oxygen saturations dropped to 88% on RA. I will admit the pt and placed the pt on 2L O2.     1235- Placed call to [facility] for admission.    1256- Discussed the pt's case with Dr. Ferguson ([facility]) who agrees to admit the pt to a telemetry bed.    MEDICAL DECISION MAKING  Results were reviewed/discussed with the patient and they were also made aware of online access. Pt also made aware that some labs, such as cultures, will not be resulted during ER visit and follow up with PMD is necessary.     MDM  Number of Diagnoses or Management Options     Amount and/or Complexity of Data Reviewed  Clinical lab tests: ordered and reviewed  (WBC=6.81)  Tests in the radiology section of CPT®: ordered and reviewed (CXR shows nothing acute)  Decide to obtain previous medical records or to obtain history from someone other than the patient: yes  Review and summarize past medical records: yes (Pt was last seen in the ED on 7-6-17 for hospital acquired pneumonia)  Discuss the patient with other providers: yes (D/w Dr. Ferguson ([facility]))  Independent visualization of images, tracings, or specimens: yes           DIAGNOSIS  Final diagnoses:   COPD exacerbation   Hypoxia       DISPOSITION  ADMISSION    Discussed treatment plan and reason for admission with pt/family and admitting physician.  Pt/family voiced understanding of the plan for admission for further testing/treatment as needed.     Latest Documented Vital Signs:  As of 12:35 PM  BP- (!) 150/112 HR- 80 Temp- 98.3 °F (36.8 °C) (Oral) O2 sat- 94%    --  Documentation assistance provided by mike Clifford for Dr. Tam.  Information recorded by the scribe was done at my direction and has been verified and validated by me.     Nunu Clifford  09/17/17 0862       Kenn Tam MD  09/17/17 7298

## 2017-09-18 ENCOUNTER — APPOINTMENT (OUTPATIENT)
Dept: CT IMAGING | Facility: HOSPITAL | Age: 32
End: 2017-09-18

## 2017-09-18 LAB
B PERT DNA SPEC QL NAA+PROBE: NOT DETECTED
C PNEUM DNA NPH QL NAA+NON-PROBE: NOT DETECTED
D DIMER PPP FEU-MCNC: 1.23 MCGFEU/ML (ref 0–0.49)
FLUAV H1 2009 PAND RNA NPH QL NAA+PROBE: NOT DETECTED
FLUAV H1 HA GENE NPH QL NAA+PROBE: NOT DETECTED
FLUAV H3 RNA NPH QL NAA+PROBE: NOT DETECTED
FLUAV SUBTYP SPEC NAA+PROBE: NOT DETECTED
FLUBV RNA ISLT QL NAA+PROBE: NOT DETECTED
HADV DNA SPEC NAA+PROBE: NOT DETECTED
HCOV 229E RNA SPEC QL NAA+PROBE: NOT DETECTED
HCOV HKU1 RNA SPEC QL NAA+PROBE: NOT DETECTED
HCOV NL63 RNA SPEC QL NAA+PROBE: NOT DETECTED
HCOV OC43 RNA SPEC QL NAA+PROBE: NOT DETECTED
HMPV RNA NPH QL NAA+NON-PROBE: NOT DETECTED
HPIV1 RNA SPEC QL NAA+PROBE: NOT DETECTED
HPIV2 RNA SPEC QL NAA+PROBE: NOT DETECTED
HPIV3 RNA NPH QL NAA+PROBE: NOT DETECTED
HPIV4 P GENE NPH QL NAA+PROBE: NOT DETECTED
M PNEUMO IGG SER IA-ACNC: NOT DETECTED
RHINOVIRUS RNA SPEC NAA+PROBE: NOT DETECTED
RSV RNA NPH QL NAA+NON-PROBE: NOT DETECTED

## 2017-09-18 PROCEDURE — 87486 CHLMYD PNEUM DNA AMP PROBE: CPT | Performed by: INTERNAL MEDICINE

## 2017-09-18 PROCEDURE — 87798 DETECT AGENT NOS DNA AMP: CPT | Performed by: INTERNAL MEDICINE

## 2017-09-18 PROCEDURE — 71275 CT ANGIOGRAPHY CHEST: CPT

## 2017-09-18 PROCEDURE — 85379 FIBRIN DEGRADATION QUANT: CPT | Performed by: INTERNAL MEDICINE

## 2017-09-18 PROCEDURE — 25010000002 ENOXAPARIN PER 10 MG: Performed by: INTERNAL MEDICINE

## 2017-09-18 PROCEDURE — 94799 UNLISTED PULMONARY SVC/PX: CPT

## 2017-09-18 PROCEDURE — 63710000001 PREDNISONE PER 1 MG: Performed by: INTERNAL MEDICINE

## 2017-09-18 PROCEDURE — 87581 M.PNEUMON DNA AMP PROBE: CPT | Performed by: INTERNAL MEDICINE

## 2017-09-18 PROCEDURE — 0 IOPAMIDOL PER 1 ML: Performed by: INTERNAL MEDICINE

## 2017-09-18 PROCEDURE — 87633 RESP VIRUS 12-25 TARGETS: CPT | Performed by: INTERNAL MEDICINE

## 2017-09-18 PROCEDURE — 25010000002 METHYLPREDNISOLONE PER 125 MG: Performed by: INTERNAL MEDICINE

## 2017-09-18 RX ORDER — PREDNISONE 20 MG/1
20 TABLET ORAL 2 TIMES DAILY WITH MEALS
Status: DISCONTINUED | OUTPATIENT
Start: 2017-09-18 | End: 2017-09-19 | Stop reason: HOSPADM

## 2017-09-18 RX ADMIN — IPRATROPIUM BROMIDE AND ALBUTEROL SULFATE 3 ML: .5; 3 SOLUTION RESPIRATORY (INHALATION) at 15:40

## 2017-09-18 RX ADMIN — MONTELUKAST 10 MG: 10 TABLET, FILM COATED ORAL at 21:15

## 2017-09-18 RX ADMIN — BUDESONIDE AND FORMOTEROL FUMARATE DIHYDRATE 2 PUFF: 160; 4.5 AEROSOL RESPIRATORY (INHALATION) at 19:57

## 2017-09-18 RX ADMIN — IPRATROPIUM BROMIDE AND ALBUTEROL SULFATE 3 ML: .5; 3 SOLUTION RESPIRATORY (INHALATION) at 19:57

## 2017-09-18 RX ADMIN — METHYLPREDNISOLONE SODIUM SUCCINATE 60 MG: 125 INJECTION, POWDER, FOR SOLUTION INTRAMUSCULAR; INTRAVENOUS at 03:48

## 2017-09-18 RX ADMIN — PREDNISONE 20 MG: 20 TABLET ORAL at 17:44

## 2017-09-18 RX ADMIN — IOPAMIDOL 100 ML: 755 INJECTION, SOLUTION INTRAVENOUS at 20:15

## 2017-09-18 RX ADMIN — ACETAMINOPHEN 650 MG: 325 TABLET ORAL at 18:26

## 2017-09-18 RX ADMIN — IPRATROPIUM BROMIDE AND ALBUTEROL SULFATE 3 ML: .5; 3 SOLUTION RESPIRATORY (INHALATION) at 07:39

## 2017-09-18 RX ADMIN — IPRATROPIUM BROMIDE AND ALBUTEROL SULFATE 3 ML: .5; 3 SOLUTION RESPIRATORY (INHALATION) at 11:24

## 2017-09-18 RX ADMIN — ENOXAPARIN SODIUM 40 MG: 40 INJECTION SUBCUTANEOUS at 08:43

## 2017-09-18 RX ADMIN — IPRATROPIUM BROMIDE AND ALBUTEROL SULFATE 3 ML: .5; 3 SOLUTION RESPIRATORY (INHALATION) at 03:56

## 2017-09-18 RX ADMIN — BUDESONIDE AND FORMOTEROL FUMARATE DIHYDRATE 2 PUFF: 160; 4.5 AEROSOL RESPIRATORY (INHALATION) at 07:39

## 2017-09-18 RX ADMIN — METHYLPREDNISOLONE SODIUM SUCCINATE 60 MG: 125 INJECTION, POWDER, FOR SOLUTION INTRAMUSCULAR; INTRAVENOUS at 08:43

## 2017-09-18 NOTE — PLAN OF CARE
Problem: Patient Care Overview (Adult)  Goal: Plan of Care Review  Outcome: Ongoing (interventions implemented as appropriate)    09/18/17 0275   Patient Care Overview   Progress improving   Outcome Evaluation   Outcome Summary/Follow up Plan weaned patient to 2L NC, encouraged to walk today. provided patient with incentive spirometer and discussed benefits. no report of SOA or pain. will cont to monitor.   Coping/Psychosocial Response Interventions   Plan Of Care Reviewed With patient         Problem: Respiratory Insufficiency (Adult)  Goal: Acid/Base Balance  Outcome: Ongoing (interventions implemented as appropriate)  Goal: Effective Ventilation  Outcome: Ongoing (interventions implemented as appropriate)    Problem: Activity Intolerance (Adult)  Goal: Identify Related Risk Factors and Signs and Symptoms  Outcome: Ongoing (interventions implemented as appropriate)  Goal: Activity Tolerance  Outcome: Ongoing (interventions implemented as appropriate)    Problem: Fall Risk (Adult)  Goal: Identify Related Risk Factors and Signs and Symptoms  Outcome: Ongoing (interventions implemented as appropriate)  Goal: Absence of Falls  Outcome: Ongoing (interventions implemented as appropriate)    Problem: Nutrition, Imbalanced: Inadequate Oral Intake (Adult)  Goal: Identify Related Risk Factors and Signs and Symptoms  Outcome: Ongoing (interventions implemented as appropriate)  Goal: Improved Oral Intake  Outcome: Ongoing (interventions implemented as appropriate)  Goal: Prevent Further Weight Loss  Outcome: Ongoing (interventions implemented as appropriate)

## 2017-09-18 NOTE — PROGRESS NOTES
Name: Craig Bolanos ADMIT: 2017   : 1985  PCP: Diane De Jesus MD    MRN: [id number] LOS: 1 days   AGE/SEX: 31 y.o. male  ROOM: [facility]   Subjective   Subjective  Cc- soa  Wheezing is better  On IV steroids  On bronchodilators  Still needing O2    ROS  No f/c  No n/v  Objective   Vital Signs  Temp:  [97.6 °F (36.4 °C)-98.3 °F (36.8 °C)] 98.2 °F (36.8 °C)  Heart Rate:  [] 93  Resp:  [16-20] 16  BP: (104-143)/(68-91) 130/91  SpO2:  [90 %-94 %] 91 %  on  Flow (L/min):  [2-3] 2;   O2 Device: nasal cannula  Body mass index is 20.97 kg/(m^2).    Physical Exam    Alert  Supple, no jvd  RRR  Lungs more clear today, no wheeze  Soft, nt  No edema or cyanosis    Results Review:       I reviewed the patient's new clinical results.    Results from last 7 days  Lab Units 17  1112   WBC 10*3/mm3 6.81   HEMOGLOBIN g/dL 14.4   PLATELETS 10*3/mm3 282     Results from last 7 days  Lab Units 17  1112   SODIUM mmol/L 141   POTASSIUM mmol/L 4.1   CHLORIDE mmol/L 104   CO2 mmol/L 23.5   BUN mg/dL 9   CREATININE mg/dL 0.98   GLUCOSE mg/dL 95   Estimated Creatinine Clearance: 100.4 mL/min (by C-G formula based on Cr of 0.98).  Results from last 7 days  Lab Units 17  1112   CALCIUM mg/dL 9.3     XR Chest 2 View [[id number]] Not Reviewed        Order Status: Completed Collected: 17 1159        Updated: 17 1206       Narrative:         XR CHEST 2 VW-     HISTORY: Male who is 31 years-old, short of breath, asthma     TECHNIQUE: Frontal and lateral views of the chest     COMPARISON: 2017     FINDINGS: Heart, mediastinum and pulmonary vasculature are unremarkable.  No focal pulmonary consolidation, pleural effusion, or pneumothorax.  Mild pulmonary hyperinflation, as well as peribronchial thickening  (especially in the right upper lung) compatible with stated history of  asthma; appearance is similar to the prior exam. No acute osseous  process.          Impression:         No focal  pulmonary consolidation. Persistent prominence of  the lung markings mild pulmonary hyperinflation.          budesonide-formoterol 2 puff Inhalation BID - RT   enoxaparin 40 mg Subcutaneous Daily   ipratropium-albuterol 3 mL Nebulization Once   ipratropium-albuterol 3 mL Nebulization Q4H - RT   montelukast 10 mg Oral Nightly   predniSONE 20 mg Oral BID With Meals      Diet Regular      Assessment/Plan   Assessment:     Active Hospital Problems (** Indicates Principal Problem)    Diagnosis Date Noted   • COPD exacerbation [J44.1] 09/17/2017   • Acute respiratory failure with hypoxia [J96.01] 09/17/2017   • Benign essential HTN [I10] 09/17/2017      Resolved Hospital Problems    Diagnosis Date Noted Date Resolved   No resolved problems to display.       Plan:     -change to po steroids  -O2 to keep sats above 90%  -Bronchodilators  -Continue most home medicines  -Check D dimer- if positive will need LE doppler and CTPE    D/W RN    Disposition  · Home when improved      Dennis Ferguson MD  [facility]  09/18/17  3:30 PM

## 2017-09-18 NOTE — CONSULTS
"Adult Nutrition  Assessment/PES    Patient Name:  Craig Bolanos  YOB: 1985  MRN: [id number]  Admit Date:  9/17/2017    Assessment Date:  9/18/2017     Comments:  Spoke with patient regarding weight loss and typical PO intake.  Patient reports depressed and stressed and says he has had no appetite over the past couple of months.  He reports eating 1 time per day, maybe 2 times rarely.  Does not drink oral nutrition supplements, interested in trying.  Reports # and says is now down to 131#.  This is a 33# weight loss (20.1%) x 2 months.  MSA completed for moderate-severe social/environmental malnutrition (with chronic disease state affecting malnutrition as well).  Added Ensure Enlive BID and snack daily.  Patient very interested in trying Ensure.  Will continue to follow.          Reason for Assessment       09/18/17 1539    Reason for Assessment    Reason For Assessment/Visit identified at risk by screening criteria;admission assessment    Identified At Risk By Screening Criteria MST SCORE 2+;unintentional loss of 10 lbs or more in the past 2 mos    Diagnosis Diagnosis    Cardiac HTN    Pulmonary/Critical Care COPD;Asthma              Nutrition/Diet History       09/18/17 1539    Nutrition/Diet History    Typical Food/Fluid Intake reports fair appetite currently, says it has been poor over the past couple of months, nothing sounds good, only eats 1-2 x/day    Supplemental Drinks/Foods/Additives asked about supplements, but has never drank them in the past, eager to try            Anthropometrics       09/18/17 1540    Anthropometrics    Height 175.3 cm (69.02\")    Weight 64.4 kg (141 lb 15.6 oz)    RD Documented Current Weight  64.4 kg (141 lb 15.6 oz)    RD Documented Weight on Admission --   patient reports now 131#    Anthropometrics (Special Considerations)    RD Calculated IBW 72.7    RD Calculated BMI (kg/m2) 19.34   based upon patient reported weight of 131#    Ideal Body Weight (IBW)    " "Ideal Body Weight (IBW), Male (kg) 73.73    % Ideal Body Weight 87.53    % Ideal Body Weight Malnutrition 80-90% - mild deficit   based upon patient reported weight of 131# - 81.8%    Usual Body Weight (UBW)    Usual Body Weight 74.4 kg (164 lb)    % Usual Body Weight 86.57    % Usual Body Weight Malnutrition 80-90% - mild deficit   79.9%    Weight Loss 15 kg (33 lb)    % Weight Loss  20.1 %    Weight Loss Time Frame approx 2 months    Body Mass Index (BMI)    BMI (kg/m2) 21    BMI Grade 19.1 - 24.9 - normal            Labs/Tests/Procedures/Meds       09/18/17 1545    Labs/Tests/Procedures/Meds    Diagnostic Test/Procedure Review reviewed, pertinent    Labs/Tests Review Reviewed    Medication Review Reviewed, pertinent;Steroid    Significant Vitals reviewed, pertinent            Physical Findings       09/18/17 1545    Physical Findings/Assessment    Additional Documentation Physical Appearance (Group)    Physical Appearance    Skin --   B=21, intact            Estimated/Assessed Needs       09/18/17 1546    Calculation Measurements    Weight Used For Calculations 64.4 kg (141 lb 15.6 oz)    Height Used for Calculations 1.753 m (5' 9.02\")    Estimated/Assessed Energy Needs    Energy Need Method Kcal/kg    kcal/kg 35    35 Kcal/Kg (kcal) 2254    Estimated Kcal Range  2708-6277    Estimated/Assessed Protein Needs    Weight Used for Protein Calculation 64.4 kg (141 lb 15.6 oz)    Protein (gm/kg) 1.5    1.5 Gm Protein (gm) 96.6    Estimated Protein Range 77-97    Estimated/Assessed Fluid Needs    Fluid Need Method RDA method    RDA Method (mL)  1610            Nutrition Prescription Ordered       09/18/17 1546    Nutrition Prescription PO    Current PO Diet Regular            Evaluation of Received Nutrient/Fluid Intake       09/18/17 1546    PO Evaluation    Number of Meals 2    % PO Intake 88              Malnutrition Severity Assessment       09/18/17 1547    Malnutrition Severity Assessment    Malnutrition Type " Social/Environmental Circumstance Malnutrition    Weight Status (Social/Environmental)    %IBW MIld (<90%)    %UBW Mod (75-85%)    Weight Loss Severe (>7.5% / 3 mo)    Energy Intake Status (Social/Environmental)    Energy Intake Severe (< or equal to 50% / > or equal to 1 mo)    Criteria Met (Must meet criteria for severity in at least 2 of these categories: M Wasting, Fat Loss, Fluid, Secondary Signs, Wt. Status, Intake)    Patient meets criteria for  Severe malnutrition   moderate-severe          Problem/Interventions:        Problem 1       09/18/17 1547    Nutrition Diagnoses Problem 1    Problem 1 Malnutrition    Etiology (related to) Medical Diagnosis;Factors Affecting Nutrition    Pulmonary/Critical Care COPD;Asthma    Appetite Poor    Mental State/Condition Depression;Other   patient reported depressed and stressed - causing no appetite    Signs/Symptoms (evidenced by) Report/Observation;Unintended Weight Change;% UBW;% IBW;Report of Mnimal PO Intake    Percent (%) IBW 79.9 %    Percent (%) UBW 81.8 %    Unintended Weight Change Loss    Number of Pounds Lost 33    Weight loss time period 2 months    Reported/Observed By Patient                    Intervention Goal       09/18/17 1549    Intervention Goal    General Maintain nutrition;Disease management/therapy;Reduce/improve symptoms;Meet nutritional needs for age/condition    PO Maintain intake    Weight No significant weight loss            Nutrition Intervention       09/18/17 1549    Nutrition Intervention    RD/Tech Action Follow Tx progress;Care plan reviewd;Encourage intake;Recommend/ordered    Recommended/Ordered Supplement;Snack            Nutrition Prescription       09/18/17 1549    Nutrition Prescription PO    PO Prescription Begin/change supplement    Supplement Ensure Enlive    Supplement Frequency 2 times a day    New PO Prescription Ordered? Yes            Education/Evaluation       09/18/17 0779    Education    Education Will Instruct as  appropriate    Monitor/Evaluation    Monitor Per protocol;PO intake;Supplement intake;Weight    Education Follow-up Reinforce PRN            Electronically signed by:  Lila Bass RD  09/18/17 3:50 PM

## 2017-09-18 NOTE — PROGRESS NOTES
Malnutrition Severity Assessment    Patient Name:  Craig Bolanos  YOB: 1985  MRN: [id number]  Admit Date:  9/17/2017    Patient meets criteria for : Severe malnutrition (moderate-severe)    Comments:  81.8% IBW, 79.9% UBW, 33# (20.1%) weight loss x 2 months, decreased PO intake      Malnutrition Type: Social/Environmental Circumstance Malnutrition     Malnutrition Type (last 8 hours)      Malnutrition Severity Assessment       09/18/17 1547    Malnutrition Severity Assessment    Malnutrition Type Social/Environmental Circumstance Malnutrition      09/18/17 1547    Weight Status (Social/Environmental)    %IBW MIld (<90%)    %UBW Mod (75-85%)    Weight Loss Severe (>7.5% / 3 mo)      09/18/17 1547    Energy Intake Status (Social/Environmental)    Energy Intake Severe (< or equal to 50% / > or equal to 1 mo)      09/18/17 1547    Criteria Met (Must meet criteria for severity in at least 2 of these categories: M Wasting, Fat Loss, Fluid, Secondary Signs, Wt. Status, Intake)    Patient meets criteria for  Severe malnutrition   moderate-severe                  Electronically signed by:  Lila Bass RD  09/18/17 3:53 PM

## 2017-09-18 NOTE — PLAN OF CARE
Problem: Patient Care Overview (Adult)  Goal: Plan of Care Review  Outcome: Ongoing (interventions implemented as appropriate)    09/18/17 0600   Patient Care Overview   Progress improving   Outcome Evaluation   Outcome Summary/Follow up Plan Medicated as ordered & tolerated well, vss, no distress noticed,I will continue to monitor.   Coping/Psychosocial Response Interventions   Plan Of Care Reviewed With patient       Goal: Adult Individualization and Mutuality  Outcome: Ongoing (interventions implemented as appropriate)  Goal: Discharge Needs Assessment  Outcome: Ongoing (interventions implemented as appropriate)    Problem: Respiratory Insufficiency (Adult)  Goal: Identify Related Risk Factors and Signs and Symptoms  Outcome: Ongoing (interventions implemented as appropriate)  Goal: Acid/Base Balance  Outcome: Ongoing (interventions implemented as appropriate)  Goal: Effective Ventilation  Outcome: Ongoing (interventions implemented as appropriate)    Problem: Activity Intolerance (Adult)  Goal: Identify Related Risk Factors and Signs and Symptoms  Outcome: Ongoing (interventions implemented as appropriate)  Goal: Activity Tolerance  Outcome: Ongoing (interventions implemented as appropriate)  Goal: Effective Energy Conservation Techniques  Outcome: Ongoing (interventions implemented as appropriate)    Problem: Fall Risk (Adult)  Goal: Absence of Falls  Outcome: Ongoing (interventions implemented as appropriate)

## 2017-09-18 NOTE — PLAN OF CARE
Problem: Nutrition, Imbalanced: Inadequate Oral Intake (Adult)  Intervention: Promote/Optimize Nutrition     Added Ensure Enlive BID and snack daily.

## 2017-09-19 ENCOUNTER — APPOINTMENT (OUTPATIENT)
Dept: CARDIOLOGY | Facility: HOSPITAL | Age: 32
End: 2017-09-19
Attending: INTERNAL MEDICINE

## 2017-09-19 VITALS
DIASTOLIC BLOOD PRESSURE: 66 MMHG | HEIGHT: 69 IN | HEART RATE: 66 BPM | BODY MASS INDEX: 21.51 KG/M2 | OXYGEN SATURATION: 97 % | WEIGHT: 145.2 LBS | SYSTOLIC BLOOD PRESSURE: 124 MMHG | TEMPERATURE: 98.3 F | RESPIRATION RATE: 18 BRPM

## 2017-09-19 PROBLEM — J96.01 ACUTE RESPIRATORY FAILURE WITH HYPOXIA: Status: RESOLVED | Noted: 2017-09-17 | Resolved: 2017-09-19

## 2017-09-19 PROBLEM — R59.0 HILAR LYMPHADENOPATHY: Status: ACTIVE | Noted: 2017-09-19

## 2017-09-19 LAB
ARTERIAL PATENCY WRIST A: POSITIVE
ATMOSPHERIC PRESS: 747.9 MMHG
BASE EXCESS BLDA CALC-SCNC: 3.3 MMOL/L (ref 0–2)
BDY SITE: ABNORMAL
BH CV LOWER VASCULAR LEFT COMMON FEMORAL AUGMENT: NORMAL
BH CV LOWER VASCULAR LEFT COMMON FEMORAL COMPETENT: NORMAL
BH CV LOWER VASCULAR LEFT COMMON FEMORAL COMPRESS: NORMAL
BH CV LOWER VASCULAR LEFT COMMON FEMORAL PHASIC: NORMAL
BH CV LOWER VASCULAR LEFT COMMON FEMORAL SPONT: NORMAL
BH CV LOWER VASCULAR LEFT DISTAL FEMORAL COMPRESS: NORMAL
BH CV LOWER VASCULAR LEFT GASTRONEMIUS COMPRESS: NORMAL
BH CV LOWER VASCULAR LEFT GREATER SAPH AK COMPRESS: NORMAL
BH CV LOWER VASCULAR LEFT GREATER SAPH BK COMPRESS: NORMAL
BH CV LOWER VASCULAR LEFT MID FEMORAL AUGMENT: NORMAL
BH CV LOWER VASCULAR LEFT MID FEMORAL COMPETENT: NORMAL
BH CV LOWER VASCULAR LEFT MID FEMORAL COMPRESS: NORMAL
BH CV LOWER VASCULAR LEFT MID FEMORAL PHASIC: NORMAL
BH CV LOWER VASCULAR LEFT MID FEMORAL SPONT: NORMAL
BH CV LOWER VASCULAR LEFT PERONEAL COMPRESS: NORMAL
BH CV LOWER VASCULAR LEFT POPLITEAL AUGMENT: NORMAL
BH CV LOWER VASCULAR LEFT POPLITEAL COMPETENT: NORMAL
BH CV LOWER VASCULAR LEFT POPLITEAL COMPRESS: NORMAL
BH CV LOWER VASCULAR LEFT POPLITEAL PHASIC: NORMAL
BH CV LOWER VASCULAR LEFT POPLITEAL SPONT: NORMAL
BH CV LOWER VASCULAR LEFT POSTERIOR TIBIAL COMPRESS: NORMAL
BH CV LOWER VASCULAR LEFT PROXIMAL FEMORAL COMPRESS: NORMAL
BH CV LOWER VASCULAR LEFT SAPHENOFEMORAL JUNCTION AUGMENT: NORMAL
BH CV LOWER VASCULAR LEFT SAPHENOFEMORAL JUNCTION COMPETENT: NORMAL
BH CV LOWER VASCULAR LEFT SAPHENOFEMORAL JUNCTION COMPRESS: NORMAL
BH CV LOWER VASCULAR LEFT SAPHENOFEMORAL JUNCTION PHASIC: NORMAL
BH CV LOWER VASCULAR LEFT SAPHENOFEMORAL JUNCTION SPONT: NORMAL
BH CV LOWER VASCULAR RIGHT COMMON FEMORAL AUGMENT: NORMAL
BH CV LOWER VASCULAR RIGHT COMMON FEMORAL COMPETENT: NORMAL
BH CV LOWER VASCULAR RIGHT COMMON FEMORAL COMPRESS: NORMAL
BH CV LOWER VASCULAR RIGHT COMMON FEMORAL PHASIC: NORMAL
BH CV LOWER VASCULAR RIGHT COMMON FEMORAL SPONT: NORMAL
BH CV LOWER VASCULAR RIGHT DISTAL FEMORAL COMPRESS: NORMAL
BH CV LOWER VASCULAR RIGHT GASTRONEMIUS COMPRESS: NORMAL
BH CV LOWER VASCULAR RIGHT GREATER SAPH AK COMPRESS: NORMAL
BH CV LOWER VASCULAR RIGHT GREATER SAPH BK COMPRESS: NORMAL
BH CV LOWER VASCULAR RIGHT MID FEMORAL AUGMENT: NORMAL
BH CV LOWER VASCULAR RIGHT MID FEMORAL COMPETENT: NORMAL
BH CV LOWER VASCULAR RIGHT MID FEMORAL COMPRESS: NORMAL
BH CV LOWER VASCULAR RIGHT MID FEMORAL PHASIC: NORMAL
BH CV LOWER VASCULAR RIGHT MID FEMORAL SPONT: NORMAL
BH CV LOWER VASCULAR RIGHT PERONEAL COMPRESS: NORMAL
BH CV LOWER VASCULAR RIGHT POPLITEAL AUGMENT: NORMAL
BH CV LOWER VASCULAR RIGHT POPLITEAL COMPETENT: NORMAL
BH CV LOWER VASCULAR RIGHT POPLITEAL COMPRESS: NORMAL
BH CV LOWER VASCULAR RIGHT POPLITEAL PHASIC: NORMAL
BH CV LOWER VASCULAR RIGHT POPLITEAL SPONT: NORMAL
BH CV LOWER VASCULAR RIGHT POSTERIOR TIBIAL COMPRESS: NORMAL
BH CV LOWER VASCULAR RIGHT PROXIMAL FEMORAL COMPRESS: NORMAL
BH CV LOWER VASCULAR RIGHT SAPHENOFEMORAL JUNCTION AUGMENT: NORMAL
BH CV LOWER VASCULAR RIGHT SAPHENOFEMORAL JUNCTION COMPETENT: NORMAL
BH CV LOWER VASCULAR RIGHT SAPHENOFEMORAL JUNCTION COMPRESS: NORMAL
BH CV LOWER VASCULAR RIGHT SAPHENOFEMORAL JUNCTION PHASIC: NORMAL
BH CV LOWER VASCULAR RIGHT SAPHENOFEMORAL JUNCTION SPONT: NORMAL
HCO3 BLDA-SCNC: 27.7 MMOL/L (ref 22–28)
MODALITY: ABNORMAL
PCO2 BLDA: 40.1 MM HG (ref 35–45)
PH BLDA: 7.45 PH UNITS (ref 7.35–7.45)
PO2 BLDA: 76.9 MM HG (ref 80–100)
SAO2 % BLDCOA: 95.8 % (ref 92–99)
TOTAL RATE: 18 BREATHS/MINUTE

## 2017-09-19 PROCEDURE — 36600 WITHDRAWAL OF ARTERIAL BLOOD: CPT

## 2017-09-19 PROCEDURE — 94799 UNLISTED PULMONARY SVC/PX: CPT

## 2017-09-19 PROCEDURE — 25010000002 ENOXAPARIN PER 10 MG: Performed by: INTERNAL MEDICINE

## 2017-09-19 PROCEDURE — 82803 BLOOD GASES ANY COMBINATION: CPT

## 2017-09-19 PROCEDURE — 93970 EXTREMITY STUDY: CPT

## 2017-09-19 PROCEDURE — 63710000001 PREDNISONE PER 1 MG: Performed by: INTERNAL MEDICINE

## 2017-09-19 RX ORDER — PREDNISONE 20 MG/1
TABLET ORAL
Qty: 9 TABLET | Refills: 0 | Status: SHIPPED | OUTPATIENT
Start: 2017-09-19 | End: 2018-07-31 | Stop reason: HOSPADM

## 2017-09-19 RX ADMIN — IPRATROPIUM BROMIDE AND ALBUTEROL SULFATE 3 ML: .5; 3 SOLUTION RESPIRATORY (INHALATION) at 00:00

## 2017-09-19 RX ADMIN — IPRATROPIUM BROMIDE AND ALBUTEROL SULFATE 3 ML: .5; 3 SOLUTION RESPIRATORY (INHALATION) at 12:49

## 2017-09-19 RX ADMIN — ENOXAPARIN SODIUM 40 MG: 40 INJECTION SUBCUTANEOUS at 09:19

## 2017-09-19 RX ADMIN — IPRATROPIUM BROMIDE AND ALBUTEROL SULFATE 3 ML: .5; 3 SOLUTION RESPIRATORY (INHALATION) at 16:34

## 2017-09-19 RX ADMIN — PREDNISONE 20 MG: 20 TABLET ORAL at 09:19

## 2017-09-19 RX ADMIN — BUDESONIDE AND FORMOTEROL FUMARATE DIHYDRATE 2 PUFF: 160; 4.5 AEROSOL RESPIRATORY (INHALATION) at 12:49

## 2017-09-19 NOTE — DISCHARGE SUMMARY
NAME: Craig Bolanos ADMIT: 2017   : 1985  PCP: Diane De Jesus MD    MRN: [id number] LOS: 2 days   AGE/SEX: 31 y.o. male  ROOM: [facility]     Date of Admission:  2017  Date of Discharge:  2017    PCP: Diane De Jesus MD    CHIEF COMPLAINT  COPD (pt c/o soa since last night, pt has hx of COPD and frequently has exacerbations. pt has taken multiple breathing treatments last night and today with minimal relief. )      DISCHARGE DIAGNOSIS  Active Hospital Problems (** Indicates Principal Problem)    Diagnosis Date Noted   • **COPD exacerbation [J44.1] 2017   • Hilar lymphadenopathy [R59.0] 2017   • Benign essential HTN [I10] 2017      Resolved Hospital Problems    Diagnosis Date Noted Date Resolved   • Acute respiratory failure with hypoxia [J96.01] 2017       SECONDARY DIAGNOSES  Past Medical History:   Diagnosis Date   • Asthma    • COPD (chronic obstructive pulmonary disease)    • Hypertension    • Migraine        HOSPITAL COURSE  30 yo with history of asthma. He's had previous hospitalizations at [facility] with intubation ×3 in the past. Was admitted to [facility] ICU on 17 for asthma exacerbation and discharged on 17. Also of note he was treated for a PNA as an outpatient within the past 2 weeks and just finished his antibiotics.     Presents with SOA.  Took multiple breathing treatments at home but not enough improvement. Came to [facility] ER. GIven IV steroids and bronchodilators in the ER which helped some but still wheezing and hypoxia so will be admitted.     He received steroids and bronchodilators. D dimer was elevated but CTPE and LE doppler negative for VTE. CT did have a slight bit of bronchietasis and will make sure he follows up with Pulmonology as an outpatient.    By  he was feeling much better and wishing for discharge. Of note his oxygen sat in the room was 88-90% on RA- but then when RT did ABG he was not hypoxic, nor was he when using  their walking pulse oximetry. I suspect this is due to his darker pigmented skin as he is , the room pulse oximetry was not as accurate. This should be noted in the future as there may be times where he may be falsely felt to be hypoxic when his true oxygenation may be ok.        DIAGNOSTICS      Blood Gas, Arterial [[id number]] (Abnormal) Collected: 09/19/17 1630       Lab Status: Final result Specimen: Arterial Blood Updated: 09/19/17 1634        Site Arterial: right radial        Donnell's Test Positive        pH, Arterial 7.446 pH units         pCO2, Arterial 40.1 mm Hg         pO2, Arterial 76.9 (L) mm Hg         HCO3, Arterial 27.7 mmol/L         Base Excess, Arterial 3.3 (H) mmol/L         O2 Saturation Calculated 95.8 %         Barometric Pressure for Blood Gas 747.9 mmHg         Modality Room Air        Rate 18 Breaths/minute         CT Angiogram Chest With & Without Contrast [[id number]] Not Reviewed       Order Status: Completed Collected: 09/18/17 1951        Updated: 09/18/17 1958       Narrative:         CT ANGIOGRAPHY OF THE CHEST WITH INTRAVENOUS CONTRAST AND 3-D  RECONSTRUCTIONS     HISTORY: Hypoxia. Evaluate for pulmonary embolus.     The CT scan was performed through the chest with CT angiography protocol  using intravenous contrast and 3-D reconstructions. The following  findings are present:  1. The pulmonary arteries are well-opacified and there is no evidence of  pulmonary embolus. The thoracic aorta shows no evidence of aneurysm or  dissection.  2. There is no mediastinal or left hilar adenopathy. There is a mildly  enlarged right hilar lymph node measuring 2 cm and greatest diameter  that is nonspecific. It is likely reactive and is unchanged from the  recent CT scan of 07/06/2017.  3. Again noted are vague changes of peribronchial thickening and some  lower lobe bronchiectasis that is similar to the previous exam. There is  no pericardial effusion. The CT images through the  upper liver, spleen,  and both adrenal glands are unremarkable.          PHYSICAL EXAM  Objective     Alert, nad  Lungs clear no resp distress  Soft, nt    CONDITION ON DISCHARGE  Stable.      DISCHARGE DISPOSITION   Home or Self Care      DISCHARGE MEDICATIONS   Craig Bolanos   Home Medication Instructions MARKY:[id number]    Printed on:09/19/17 1644   Medication Information                      budesonide-formoterol (SYMBICORT) 160-4.5 MCG/ACT inhaler  Inhale 2 puffs 2 (Two) Times a Day.             ipratropium-albuterol (COMBIVENT RESPIMAT)  MCG/ACT inhaler  Inhale 1 puff 4 (Four) Times a Day As Needed for Wheezing.             ipratropium-albuterol (DUO-NEB) 0.5-2.5 mg/mL nebulizer  Take 3 mL by nebulization Every 4 (Four) Hours As Needed for Shortness of Air.             lisinopril (PRINIVIL,ZESTRIL) 5 MG tablet  Take 5 mg by mouth Daily.             Montelukast Sodium (SINGULAIR PO)  Take  by mouth Daily.             predniSONE (DELTASONE) 20 MG tablet  BID x 3 days, then daily x 3 days                No future appointments.  Additional Instructions for the Follow-ups that You Need to Schedule     Additional Discharge Follow-Up (Specify Provider)    As directed    To:  Pulmonologist   Follow Up:  1 Month   Follow Up Details:  [facility] if he does not already have one       Discharge Follow-up with PCP    As directed    Follow Up Details:  1-2 weeks             Follow-up Information     Follow up with Diane De Jesus MD .    Specialty:  Internal Medicine    Why:  1-2 weeks    Contact information:    [address] [facility] [address] [phone]            TEST  RESULTS PENDING AT DISCHARGE         Dennis Ferguson MD  [facility]  09/19/17  4:48 PM      Time: greater than 32 minutes on discharge  It was a pleasure taking care of this patient while in the hospital.

## 2017-09-19 NOTE — PROGRESS NOTES
Patient wishing to be discharged. Walking oximetry ordered at 744am and ABG ordered at 1248pm and still neither completed despite discussion with RT at 1245. Need to know if still having hypoxia despite resolution of his asthma symptoms

## 2017-09-19 NOTE — PLAN OF CARE
Problem: Patient Care Overview (Adult)  Goal: Plan of Care Review  Outcome: Ongoing (interventions implemented as appropriate)    09/19/17 0300   Patient Care Overview   Progress improving   Outcome Evaluation   Outcome Summary/Follow up Plan Quiet night with stable vital signs, oxygen sat was wnl with 2L N/C, no distress noticed,I will continue to monitor.   Coping/Psychosocial Response Interventions   Plan Of Care Reviewed With patient       Goal: Adult Individualization and Mutuality  Outcome: Ongoing (interventions implemented as appropriate)  Goal: Discharge Needs Assessment  Outcome: Ongoing (interventions implemented as appropriate)    Problem: Respiratory Insufficiency (Adult)  Goal: Identify Related Risk Factors and Signs and Symptoms  Outcome: Ongoing (interventions implemented as appropriate)  Goal: Acid/Base Balance  Outcome: Ongoing (interventions implemented as appropriate)  Goal: Effective Ventilation  Outcome: Ongoing (interventions implemented as appropriate)    Problem: Activity Intolerance (Adult)  Goal: Identify Related Risk Factors and Signs and Symptoms  Outcome: Ongoing (interventions implemented as appropriate)  Goal: Activity Tolerance  Outcome: Ongoing (interventions implemented as appropriate)  Goal: Effective Energy Conservation Techniques  Outcome: Ongoing (interventions implemented as appropriate)    Problem: Fall Risk (Adult)  Goal: Identify Related Risk Factors and Signs and Symptoms  Outcome: Ongoing (interventions implemented as appropriate)  Goal: Absence of Falls  Outcome: Ongoing (interventions implemented as appropriate)    Problem: Nutrition, Imbalanced: Inadequate Oral Intake (Adult)  Goal: Identify Related Risk Factors and Signs and Symptoms  Outcome: Ongoing (interventions implemented as appropriate)  Goal: Improved Oral Intake  Outcome: Ongoing (interventions implemented as appropriate)  Goal: Prevent Further Weight Loss  Outcome: Ongoing (interventions implemented as  appropriate)

## 2017-09-19 NOTE — PROGRESS NOTES
Discharge Planning Assessment  [facility]     Patient Name: Craig Bolanos  MRN: [id number]  Today's Date: 9/19/2017    Admit Date: 9/17/2017          Discharge Needs Assessment       09/19/17 1640    Living Environment    Lives With parent(s)    Living Arrangements apartment    Home Accessibility no concerns    Living Environment    Quality Of Family Relationships supportive    Able to Return to Prior Living Arrangements yes    Discharge Needs Assessment    Equipment Currently Used at Home respiratory supplies;nebulizer            Discharge Plan       09/19/17 1641    Case Management/Social Work Plan    Plan Plans are home.  CCP will follow.    Additional Comments Spoke with pt @ bedside.  Confirmed facesheet and pharmacy info with pt.  Pt states he lives with his mother.  Pt is MAN[employer]'s, has a nebulizer and inhalers @ home.  Pt states plans are to return home.  CCP will follow for d/c needs        Discharge Placement     No information found        Expected Discharge Date and Time     Expected Discharge Date Expected Discharge Time    Sep 19, 2017               Demographic Summary     None            Functional Status     None            Psychosocial     None            Abuse/Neglect     None            Legal     None            Substance Abuse     None            Patient Forms     None          Lucero Her RN

## 2017-09-19 NOTE — PROGRESS NOTES
Exercise Oximetry    Patient Name:Craig Bolanos   MRN: [id number]   Date: 09/19/17             ROOM AIR BASELINE   SpO2% 97   Heart Rate 98   Blood Pressure      EXERCISE ON ROOM AIR SpO2% EXERCISE ON O2 @  LPM SpO2%   1 MINUTE 97 1 MINUTE    2 MINUTES 96 2 MINUTES    3 MINUTES 97 3 MINUTES    4 MINUTES 97 4 MINUTES    5 MINUTES 97 5 MINUTES    6 MINUTES 98 6 MINUTES               Distance Walked   Distance Walked   Dyspnea (Gayla Scale)   Dyspnea (Gayla Scale)   Fatigue (Gayla Scale)   Fatigue (Gayla Scale)   SpO2% Post Exercise   SpO2% Post Exercise   HR Post Exercise   HR Post Exercise   Time to Recovery   Time to Recovery     Comments: PT was able to walk with no use of cane or assistance while holding conversation. PT NEVER desated at any time. PT does not qualify or kofi home O2.

## 2017-11-11 ENCOUNTER — APPOINTMENT (OUTPATIENT)
Dept: GENERAL RADIOLOGY | Facility: HOSPITAL | Age: 32
End: 2017-11-11

## 2017-11-11 ENCOUNTER — HOSPITAL ENCOUNTER (EMERGENCY)
Facility: HOSPITAL | Age: 32
Discharge: HOME | End: 2017-11-11
Attending: EMERGENCY MEDICINE | Admitting: EMERGENCY MEDICINE

## 2017-11-11 VITALS
HEIGHT: 70 IN | WEIGHT: 145 LBS | RESPIRATION RATE: 20 BRPM | SYSTOLIC BLOOD PRESSURE: 142 MMHG | OXYGEN SATURATION: 93 % | BODY MASS INDEX: 20.76 KG/M2 | DIASTOLIC BLOOD PRESSURE: 94 MMHG | HEART RATE: 104 BPM | TEMPERATURE: 98.1 F

## 2017-11-11 DIAGNOSIS — Z76.0 MEDICATION REFILL: ICD-10-CM

## 2017-11-11 DIAGNOSIS — J20.9 ACUTE BRONCHITIS, UNSPECIFIED ORGANISM: ICD-10-CM

## 2017-11-11 DIAGNOSIS — J44.1 COPD EXACERBATION (HCC): Primary | ICD-10-CM

## 2017-11-11 LAB
ARTERIAL PATENCY WRIST A: POSITIVE
ATMOSPHERIC PRESS: 755.4 MMHG
BASE EXCESS BLDA CALC-SCNC: 1.7 MMOL/L (ref 0–2)
BDY SITE: ABNORMAL
GAS FLOW AIRWAY: 2 LPM
HCO3 BLDA-SCNC: 26.4 MMOL/L (ref 22–28)
MODALITY: ABNORMAL
PCO2 BLDA: 40.9 MM HG (ref 35–45)
PH BLDA: 7.42 PH UNITS (ref 7.35–7.45)
PO2 BLDA: 78.2 MM HG (ref 80–100)
S PYO AG THROAT QL: NEGATIVE
SAO2 % BLDCOA: 95.6 % (ref 92–99)
SET MECH RESP RATE: 20

## 2017-11-11 PROCEDURE — 94640 AIRWAY INHALATION TREATMENT: CPT

## 2017-11-11 PROCEDURE — 87880 STREP A ASSAY W/OPTIC: CPT | Performed by: NURSE PRACTITIONER

## 2017-11-11 PROCEDURE — 99284 EMERGENCY DEPT VISIT MOD MDM: CPT

## 2017-11-11 PROCEDURE — 96374 THER/PROPH/DIAG INJ IV PUSH: CPT

## 2017-11-11 PROCEDURE — 71010 HC CHEST PA OR AP: CPT

## 2017-11-11 PROCEDURE — 25010000002 METHYLPREDNISOLONE PER 125 MG: Performed by: NURSE PRACTITIONER

## 2017-11-11 PROCEDURE — 36600 WITHDRAWAL OF ARTERIAL BLOOD: CPT

## 2017-11-11 PROCEDURE — 82803 BLOOD GASES ANY COMBINATION: CPT

## 2017-11-11 PROCEDURE — 87081 CULTURE SCREEN ONLY: CPT | Performed by: NURSE PRACTITIONER

## 2017-11-11 PROCEDURE — 94799 UNLISTED PULMONARY SVC/PX: CPT

## 2017-11-11 RX ORDER — PREDNISONE 20 MG/1
40 TABLET ORAL DAILY
Qty: 14 TABLET | Refills: 0 | Status: SHIPPED | OUTPATIENT
Start: 2017-11-11 | End: 2017-11-18

## 2017-11-11 RX ORDER — ALBUTEROL SULFATE 2.5 MG/3ML
2.5 SOLUTION RESPIRATORY (INHALATION) ONCE
Status: COMPLETED | OUTPATIENT
Start: 2017-11-11 | End: 2017-11-11

## 2017-11-11 RX ORDER — CETIRIZINE HYDROCHLORIDE 10 MG/1
10 TABLET ORAL DAILY
COMMUNITY
End: 2022-11-15 | Stop reason: HOSPADM

## 2017-11-11 RX ORDER — METHYLPREDNISOLONE SODIUM SUCCINATE 125 MG/2ML
125 INJECTION, POWDER, LYOPHILIZED, FOR SOLUTION INTRAMUSCULAR; INTRAVENOUS ONCE
Status: COMPLETED | OUTPATIENT
Start: 2017-11-11 | End: 2017-11-11

## 2017-11-11 RX ORDER — SODIUM CHLORIDE 0.9 % (FLUSH) 0.9 %
10 SYRINGE (ML) INJECTION AS NEEDED
Status: DISCONTINUED | OUTPATIENT
Start: 2017-11-11 | End: 2017-11-12 | Stop reason: HOSPADM

## 2017-11-11 RX ORDER — HYDROCODONE BITARTRATE AND ACETAMINOPHEN 7.5; 325 MG/1; MG/1
1 TABLET ORAL ONCE
Status: COMPLETED | OUTPATIENT
Start: 2017-11-11 | End: 2017-11-11

## 2017-11-11 RX ORDER — AZITHROMYCIN 250 MG/1
TABLET, FILM COATED ORAL
Qty: 6 TABLET | Refills: 0 | Status: SHIPPED | OUTPATIENT
Start: 2017-11-11 | End: 2018-07-31 | Stop reason: HOSPADM

## 2017-11-11 RX ORDER — BUDESONIDE AND FORMOTEROL FUMARATE DIHYDRATE 160; 4.5 UG/1; UG/1
2 AEROSOL RESPIRATORY (INHALATION)
Qty: 1 INHALER | Refills: 0 | Status: ON HOLD | OUTPATIENT
Start: 2017-11-11 | End: 2022-11-15 | Stop reason: SDUPTHER

## 2017-11-11 RX ORDER — IPRATROPIUM BROMIDE AND ALBUTEROL SULFATE 2.5; .5 MG/3ML; MG/3ML
3 SOLUTION RESPIRATORY (INHALATION) ONCE
Status: COMPLETED | OUTPATIENT
Start: 2017-11-11 | End: 2017-11-11

## 2017-11-11 RX ORDER — MONTELUKAST SODIUM 10 MG/1
10 TABLET ORAL NIGHTLY
Qty: 30 TABLET | Refills: 0 | Status: SHIPPED | OUTPATIENT
Start: 2017-11-11

## 2017-11-11 RX ADMIN — ALBUTEROL SULFATE 2.5 MG: 2.5 SOLUTION RESPIRATORY (INHALATION) at 21:25

## 2017-11-11 RX ADMIN — METHYLPREDNISOLONE SODIUM SUCCINATE 125 MG: 125 INJECTION, POWDER, FOR SOLUTION INTRAMUSCULAR; INTRAVENOUS at 20:18

## 2017-11-11 RX ADMIN — IPRATROPIUM BROMIDE AND ALBUTEROL SULFATE 3 ML: .5; 3 SOLUTION RESPIRATORY (INHALATION) at 20:07

## 2017-11-11 RX ADMIN — IPRATROPIUM BROMIDE AND ALBUTEROL SULFATE 3 ML: .5; 3 SOLUTION RESPIRATORY (INHALATION) at 20:38

## 2017-11-11 RX ADMIN — HYDROCODONE BITARTRATE AND ACETAMINOPHEN 1 TABLET: 7.5; 325 TABLET ORAL at 21:18

## 2017-11-12 NOTE — ED PROVIDER NOTES
Pt with a history of COPD and asthma presents c/o dyspnea which began PTA.. The pt states that he has an inhaler at home, which he has taken with no relief. The pt also c/o dry cough.     PEx  Lung: Mild diffuse wheezing in all lung field  No respiratory distress  Per PA the pt is sounding better after receiving nebs and solumedrol.    Agree with plan to re-assess the pt after receiving his 3rd neb treatment.     I supervised care provided by the midlevel provider.    We have discussed this patient's history, physical exam, and treatment plan.   I have reviewed the note and personally saw and examined the patient and agree with the plan of care.    Documentation assistance provided by mike De Paz for Dr. Pelaez.  Information recorded by the scribe was done at my direction and has been verified and validated by me.       Delfina De Paz  11/11/17 2138       Damir Pelaez MD  11/11/17 2222       Damir Pelaez MD  11/11/17 2223

## 2017-11-12 NOTE — DISCHARGE INSTRUCTIONS
Return Precautions    Although you are being discharged from the ED today, I encourage you to return for worsening symptoms.  Things can, and do, change such that treatment at home with medication may not be adequate.      Specifically, return for any of the following:    Chest pain, worsening shortness of breath, pain or nausea and vomiting not controlled by medications provided.    Please make a follow up with your Primary Care Provider for a blood pressure recheck. Please do not let yourself run out of pain medications

## 2017-11-12 NOTE — ED PROVIDER NOTES
"EMERGENCY DEPARTMENT ENCOUNTER    CHIEF COMPLAINT  Chief Complaint: Dyspnea  History given by:Patient  History limited by:  Room Number: 11/11  PMD: Diane De Jesus MD  Pulmonologist: Dr. Ruiz    HPI:   Chief complaint: Dyspnea  Context: Pt with a hx of COPD and asthma presents c/o dyspnea onset PTA. He normally uses inhaler, but states that he is currently out of his inhalers. He did not receive any breathing txs PTA. Pt denies any CP, fever, chills, N/V, or any recent illness. He has had a cough, but reports that it is nonproductive.     Duration: PTA  Timing:constant  Quality: \"asthma\"  Intensity/Severity: moderate  Associated Symptoms: cough  Aggravating Factors: none  Alleviating Factors: none  Previous Episodes: hx of asthma and COPD  Treatment before arrival:none    MEDICAL RECORD REVIEW    ALLERGIES  Review of patient's allergies indicates no known allergies.    PAST MEDICAL HISTORY  Active Ambulatory Problems     Diagnosis Date Noted   • Severe persistent asthma with status asthmaticus 06/01/2017   • COPD exacerbation 09/17/2017   • Benign essential HTN 09/17/2017   • Hilar lymphadenopathy 09/19/2017     Resolved Ambulatory Problems     Diagnosis Date Noted   • Acute respiratory failure with hypoxia 09/17/2017     Past Medical History:   Diagnosis Date   • Asthma    • COPD (chronic obstructive pulmonary disease)    • Hypertension    • Migraine        PAST SURGICAL HISTORY  History reviewed. No pertinent surgical history.    FAMILY HISTORY  Family History   Problem Relation Age of Onset   • Diabetes Mother        SOCIAL HISTORY  Social History     Social History   • Marital status: Single     Spouse name: N/A   • Number of children: N/A   • Years of education: N/A     Occupational History   • Not on file.     Social History Main Topics   • Smoking status: Former Smoker     Packs/day: 1.00     Years: 10.00     Types: Cigarettes     Quit date: 6/1/2013   • Smokeless tobacco: Not on file   • Alcohol use No "   • Drug use: No   • Sexual activity: Defer     Other Topics Concern   • Not on file     Social History Narrative         REVIEW OF SYSTEMS  Review of Systems   Constitutional: Negative for activity change, appetite change, diaphoresis and fever.   HENT: Negative for trouble swallowing.    Eyes: Negative for visual disturbance.   Respiratory: Positive for cough and shortness of breath. Negative for chest tightness and wheezing.    Cardiovascular: Negative for chest pain, palpitations and leg swelling.   Gastrointestinal: Negative for abdominal pain, diarrhea, nausea and vomiting.   Genitourinary: Negative for dysuria.   Musculoskeletal: Negative for back pain.   Skin: Negative for rash.   Neurological: Negative for dizziness, speech difficulty and light-headedness.       PHYSICAL EXAM  ED Triage Vitals   Temp Heart Rate Resp BP SpO2   11/11/17 1922 11/11/17 1918 11/11/17 1918 11/11/17 1918 11/11/17 1918   98.1 °F (36.7 °C) 99 18 134/84 90 %      Temp src Heart Rate Source Patient Position BP Location FiO2 (%)   11/11/17 1918 11/11/17 1934 11/11/17 1934 11/11/17 1934 --   Tympanic Monitor Lying Right arm        Physical Exam   Constitutional: He is oriented to person, place, and time. He appears distressed.   HENT:   Head: Normocephalic and atraumatic.   Right Ear: Tympanic membrane normal. Tympanic membrane is not erythematous.   Left Ear: Tympanic membrane normal. Tympanic membrane is not erythematous.   Eyes: Pupils are equal, round, and reactive to light.   Neck: Normal range of motion. Neck supple.   Cardiovascular: Normal rate and normal heart sounds.  Exam reveals no gallop and no friction rub.    No murmur heard.  Pulmonary/Chest: He is in respiratory distress. He has decreased breath sounds in the right lower field, the left upper field and the left lower field. He has wheezes in the right upper field, the right middle field, the right lower field, the left upper field, the left middle field and the left  lower field. He has no rales. He exhibits no tenderness.   94% on 4L O2   Abdominal: Soft. There is no rebound and no guarding.   Musculoskeletal: Normal range of motion. He exhibits no deformity.   Lymphadenopathy:     He has no cervical adenopathy.   Neurological: He is alert and oriented to person, place, and time.   Skin: Skin is warm and dry.   Psychiatric: Affect normal.   Nursing note and vitals reviewed.      LAB RESULTS  Recent Results (from the past 24 hour(s))   Rapid Strep A Screen - Swab, Throat    Collection Time: 11/11/17  8:48 PM   Result Value Ref Range    Strep A Ag Negative Negative   Blood Gas, Arterial    Collection Time: 11/11/17  9:01 PM   Result Value Ref Range    Site Arterial: right radial     Donnell's Test Positive     pH, Arterial 7.418 7.350 - 7.450 pH units    pCO2, Arterial 40.9 35.0 - 45.0 mm Hg    pO2, Arterial 78.2 (L) 80.0 - 100.0 mm Hg    HCO3, Arterial 26.4 22.0 - 28.0 mmol/L    Base Excess, Arterial 1.7 0.0 - 2.0 mmol/L    O2 Saturation Calculated 95.6 92.0 - 99.0 %    Barometric Pressure for Blood Gas 755.4 mmHg    Modality Cannula     Flow Rate 2 lpm    Set [facility] Resp Rate 20        I ordered the above labs and reviewed the results    RADIOLOGY  XR Chest 1 View   Final Result       No active disease by portable imaging.       This report was finalized on 11/11/2017 8:22 PM by Abdulaziz Song MD.              I ordered the above noted radiological studies and reviewed the images on the PACS system.     MEDICATIONS GIVEN IN ER  Medications   sodium chloride 0.9 % flush 10 mL (not administered)   albuterol (PROVENTIL) nebulizer solution 0.083% 2.5 mg/3mL (not administered)   HYDROcodone-acetaminophen (NORCO) 7.5-325 MG per tablet 1 tablet (not administered)   methylPREDNISolone sodium succinate (SOLU-Medrol) injection 125 mg (125 mg Intravenous Given 11/11/17 2018)   ipratropium-albuterol (DUO-NEB) nebulizer solution 3 mL (3 mL Nebulization Given 11/11/17 2007)  "  ipratropium-albuterol (DUO-NEB) nebulizer solution 3 mL (3 mL Nebulization Given 11/11/17 2038)     PROCEDURES  Procedures    COURSE & MEDICAL DECISION MAKING  Pertinent Labs and Imaging studies that were ordered and reviewed are noted above.  Results were reviewed/discussed with the patient and they were also made aware of online assess.  Pt also made aware that some labs, such as cultures, will not be resulted during ER visit and follow up with PMD is necessary.       PROGRESS AND CONSULTS    Progress Notes:    ED Course       2029 Re-evaluated pt. On repeat pulmonary exam, there is minimal improvement of lung sounds. However, he appears more comfortable at this time.     2045 Reviewed pt's history and workup with Dr. Pelaez.  After a bedside evaluation; Dr Pelaez agrees with the plan of care.    2112 Re-evaluated pt. He is resting comfortably and in NAD. Informed the pt of plan to discharge with Z-pack and Symbicort, short course of steroids, refill on inhaler and Singular. Pt understands and agrees with plan. All concerns addressed. The patient's history, physical exam, and lab findings were discussed with the physician, who also performed a face to face history and physical exam.  I discussed all results and noted any abnormalities with patient.  Discussed absoute need to recheck abnormalities with their family physician.  I answered any of the patient's questions.  Discussed plan for discharge, as there is no emergent indication for admission.  Pt is agreeable and understands need for follow up and repeat testing.  Pt is aware that discharge does not mean that nothing is wrong but it indicates no emergency is present and they must continue care with their family physician.    2216 Re-examined the pt after 3rd breathing tx. On pulmonary exam, his lungs are CTAB. Will discharge.         /84  Pulse 109  Temp 98.1 °F (36.7 °C) (Tympanic)   Resp 20  Ht 70\" (177.8 cm)  Wt 145 lb (65.8 kg)  SpO2 93%  " BMI 20.81 kg/m2      DIAGNOSIS  Final diagnoses:   COPD exacerbation   Acute bronchitis, unspecified organism   Medication refill       FOLLOW UP   Yannick Ruiz MD  [address]  [place] [address]  [phone]    Call in 2 days  For Pulmonologist follow-up      RX     Medication List      Notice     No changes were made to your prescriptions during this visit.        EMR Dragon/Transcription disclaimer:  Part of this note is an electronic transcription of spoken language to printed text.  The electronic translation/transcription may permit erroneous, or at times, nonsensical words or phrases to be inadvertently transcribed; I have reviewed the note for such errors however some may still exist.     Documentation assistance provided by mike Holman for CATALINO Michel  Information recorded by the scribe was done at my direction and has been verified and validated by me.  Electronically signed by Surinder Holman on 11/11/2017 at time 9:15 PM     Homar Holman  11/11/17 1842       CATALINO Mora  11/13/17 [id number]

## 2017-11-12 NOTE — ED TRIAGE NOTES
Brought in by ems from home for asthma/copd exasperation. C/o soa even with several breathing treatments. Able to speak in short sentences. Has relaxed face/body language.

## 2017-11-13 LAB — BACTERIA SPEC AEROBE CULT: NORMAL

## 2017-12-15 ENCOUNTER — APPOINTMENT (OUTPATIENT)
Dept: GENERAL RADIOLOGY | Facility: HOSPITAL | Age: 32
End: 2017-12-15

## 2017-12-15 ENCOUNTER — HOSPITAL ENCOUNTER (EMERGENCY)
Facility: HOSPITAL | Age: 32
Discharge: HOME | End: 2017-12-15
Attending: EMERGENCY MEDICINE | Admitting: EMERGENCY MEDICINE

## 2017-12-15 VITALS
BODY MASS INDEX: 23.7 KG/M2 | TEMPERATURE: 97.7 F | WEIGHT: 160 LBS | SYSTOLIC BLOOD PRESSURE: 112 MMHG | OXYGEN SATURATION: 95 % | HEIGHT: 69 IN | RESPIRATION RATE: 18 BRPM | HEART RATE: 111 BPM | DIASTOLIC BLOOD PRESSURE: 58 MMHG

## 2017-12-15 DIAGNOSIS — J45.51 SEVERE PERSISTENT ASTHMA WITH ACUTE EXACERBATION: Primary | ICD-10-CM

## 2017-12-15 LAB
ALBUMIN SERPL-MCNC: 4.5 G/DL (ref 3.5–5.2)
ALBUMIN/GLOB SERPL: 1.2 G/DL
ALP SERPL-CCNC: 51 U/L (ref 39–117)
ALT SERPL W P-5'-P-CCNC: 13 U/L (ref 1–41)
ANION GAP SERPL CALCULATED.3IONS-SCNC: 14.9 MMOL/L
AST SERPL-CCNC: 15 U/L (ref 1–40)
BASOPHILS # BLD AUTO: 0.03 10*3/MM3 (ref 0–0.2)
BASOPHILS NFR BLD AUTO: 0.2 % (ref 0–1.5)
BILIRUB SERPL-MCNC: 0.6 MG/DL (ref 0.1–1.2)
BUN BLD-MCNC: 13 MG/DL (ref 6–20)
BUN/CREAT SERPL: 11.8 (ref 7–25)
CALCIUM SPEC-SCNC: 9.5 MG/DL (ref 8.6–10.5)
CHLORIDE SERPL-SCNC: 102 MMOL/L (ref 98–107)
CO2 SERPL-SCNC: 23.1 MMOL/L (ref 22–29)
CREAT BLD-MCNC: 1.1 MG/DL (ref 0.76–1.27)
DEPRECATED RDW RBC AUTO: 46.5 FL (ref 37–54)
EOSINOPHIL # BLD AUTO: 0.45 10*3/MM3 (ref 0–0.7)
EOSINOPHIL NFR BLD AUTO: 3.5 % (ref 0.3–6.2)
ERYTHROCYTE [DISTWIDTH] IN BLOOD BY AUTOMATED COUNT: 14 % (ref 11.5–14.5)
GFR SERPL CREATININE-BSD FRML MDRD: 95 ML/MIN/1.73
GLOBULIN UR ELPH-MCNC: 3.7 GM/DL
GLUCOSE BLD-MCNC: 111 MG/DL (ref 65–99)
HCT VFR BLD AUTO: 47.2 % (ref 40.4–52.2)
HGB BLD-MCNC: 15.4 G/DL (ref 13.7–17.6)
IMM GRANULOCYTES # BLD: 0.03 10*3/MM3 (ref 0–0.03)
IMM GRANULOCYTES NFR BLD: 0.2 % (ref 0–0.5)
LYMPHOCYTES # BLD AUTO: 4.07 10*3/MM3 (ref 0.9–4.8)
LYMPHOCYTES NFR BLD AUTO: 31.8 % (ref 19.6–45.3)
MCH RBC QN AUTO: 29.5 PG (ref 27–32.7)
MCHC RBC AUTO-ENTMCNC: 32.6 G/DL (ref 32.6–36.4)
MCV RBC AUTO: 90.4 FL (ref 79.8–96.2)
MONOCYTES # BLD AUTO: 1.18 10*3/MM3 (ref 0.2–1.2)
MONOCYTES NFR BLD AUTO: 9.2 % (ref 5–12)
NEUTROPHILS # BLD AUTO: 7.03 10*3/MM3 (ref 1.9–8.1)
NEUTROPHILS NFR BLD AUTO: 55.1 % (ref 42.7–76)
PLATELET # BLD AUTO: 238 10*3/MM3 (ref 140–500)
PMV BLD AUTO: 10.6 FL (ref 6–12)
POTASSIUM BLD-SCNC: 3.9 MMOL/L (ref 3.5–5.2)
PROT SERPL-MCNC: 8.2 G/DL (ref 6–8.5)
RBC # BLD AUTO: 5.22 10*6/MM3 (ref 4.6–6)
SODIUM BLD-SCNC: 140 MMOL/L (ref 136–145)
WBC NRBC COR # BLD: 12.79 10*3/MM3 (ref 4.5–10.7)

## 2017-12-15 PROCEDURE — 99284 EMERGENCY DEPT VISIT MOD MDM: CPT

## 2017-12-15 PROCEDURE — 25010000002 MAGNESIUM SULFATE 2 GM/50ML SOLUTION: Performed by: EMERGENCY MEDICINE

## 2017-12-15 PROCEDURE — 80053 COMPREHEN METABOLIC PANEL: CPT | Performed by: EMERGENCY MEDICINE

## 2017-12-15 PROCEDURE — 96366 THER/PROPH/DIAG IV INF ADDON: CPT

## 2017-12-15 PROCEDURE — 71010 HC CHEST PA OR AP: CPT

## 2017-12-15 PROCEDURE — 93005 ELECTROCARDIOGRAM TRACING: CPT | Performed by: EMERGENCY MEDICINE

## 2017-12-15 PROCEDURE — 94640 AIRWAY INHALATION TREATMENT: CPT

## 2017-12-15 PROCEDURE — 93010 ELECTROCARDIOGRAM REPORT: CPT | Performed by: INTERNAL MEDICINE

## 2017-12-15 PROCEDURE — 94799 UNLISTED PULMONARY SVC/PX: CPT

## 2017-12-15 PROCEDURE — 96365 THER/PROPH/DIAG IV INF INIT: CPT

## 2017-12-15 PROCEDURE — 85025 COMPLETE CBC W/AUTO DIFF WBC: CPT | Performed by: EMERGENCY MEDICINE

## 2017-12-15 RX ORDER — PREDNISONE 20 MG/1
20 TABLET ORAL 2 TIMES DAILY
Qty: 10 TABLET | Refills: 0 | Status: SHIPPED | OUTPATIENT
Start: 2017-12-15 | End: 2018-07-31 | Stop reason: HOSPADM

## 2017-12-15 RX ORDER — MAGNESIUM SULFATE HEPTAHYDRATE 40 MG/ML
2 INJECTION, SOLUTION INTRAVENOUS ONCE
Status: COMPLETED | OUTPATIENT
Start: 2017-12-15 | End: 2017-12-15

## 2017-12-15 RX ORDER — IBUPROFEN 800 MG/1
800 TABLET ORAL ONCE
Status: COMPLETED | OUTPATIENT
Start: 2017-12-15 | End: 2017-12-15

## 2017-12-15 RX ORDER — ALBUTEROL SULFATE 2.5 MG/3ML
2.5 SOLUTION RESPIRATORY (INHALATION)
Status: COMPLETED | OUTPATIENT
Start: 2017-12-15 | End: 2017-12-15

## 2017-12-15 RX ADMIN — ALBUTEROL SULFATE 2.5 MG: 2.5 SOLUTION RESPIRATORY (INHALATION) at 16:14

## 2017-12-15 RX ADMIN — MAGNESIUM SULFATE HEPTAHYDRATE 2 G: 40 INJECTION, SOLUTION INTRAVENOUS at 15:54

## 2017-12-15 RX ADMIN — ALBUTEROL SULFATE 2.5 MG: 2.5 SOLUTION RESPIRATORY (INHALATION) at 16:10

## 2017-12-15 RX ADMIN — ALBUTEROL SULFATE 2.5 MG: 2.5 SOLUTION RESPIRATORY (INHALATION) at 16:22

## 2017-12-15 RX ADMIN — ALBUTEROL SULFATE 12.5 MG: 2.5 SOLUTION RESPIRATORY (INHALATION) at 16:38

## 2017-12-15 RX ADMIN — IBUPROFEN 800 MG: 800 TABLET ORAL at 17:45

## 2017-12-15 NOTE — ED PROVIDER NOTES
EMERGENCY DEPARTMENT ENCOUNTER       CHIEF COMPLAINT  Chief Complaint: Dyspnea  History given by: Patient  History limited by: Patient is in respiratory distress  Room Number: 27/27  PMD: Diane De Jesus MD      HPI:  HPI Comments: Pt with h/o asthma and COPD presents to the ED c/o dyspnea onset yesterday. Pt states that he has used breathing treatments without significant sx relief. Thus, pt states that his dyspnea has progressively worsened since initial onset. Consequently, pt called the paramedics for this earlier today. Upon paramedics' arrival on scene, pt was administered steroids. Pt denies chest pain, vomiting, cough, and abdominal pain. There are no other complaints at this time.     Patient is a 31 y.o. male presenting with shortness of breath.   Shortness of Breath   Severity:  Severe  Onset quality:  Gradual  Duration: onset yesterday.  Timing:  Constant  Progression:  Worsening  Chronicity:  Recurrent (pt has h/o COPD and CHF)  Context comment:  Pt has h/o COPD and CHF  Relieved by:  Nothing  Worsened by:  Nothing  Ineffective treatments: breathing treatments.  Associated symptoms: no abdominal pain, no chest pain, no cough and no vomiting          PAST MEDICAL HISTORY  Active Ambulatory Problems     Diagnosis Date Noted   • Severe persistent asthma with status asthmaticus 06/01/2017   • COPD exacerbation 09/17/2017   • Benign essential HTN 09/17/2017   • Hilar lymphadenopathy 09/19/2017     Resolved Ambulatory Problems     Diagnosis Date Noted   • Acute respiratory failure with hypoxia 09/17/2017     Past Medical History:   Diagnosis Date   • Asthma    • COPD (chronic obstructive pulmonary disease)    • Hypertension    • Migraine          PAST SURGICAL HISTORY  History reviewed. No pertinent surgical history.      FAMILY HISTORY  Family History   Problem Relation Age of Onset   • Diabetes Mother          SOCIAL HISTORY  Social History     Social History   • Marital status: Single     Spouse name:  N/A   • Number of children: N/A   • Years of education: N/A     Occupational History   • Not on file.     Social History Main Topics   • Smoking status: Former Smoker     Packs/day: 1.00     Years: 10.00     Types: Cigarettes     Quit date: 6/1/2013   • Smokeless tobacco: Not on file   • Alcohol use No   • Drug use: No   • Sexual activity: Defer     Other Topics Concern   • Not on file     Social History Narrative         ALLERGIES  Review of patient's allergies indicates no known allergies.        REVIEW OF SYSTEMS  Review of Systems   Unable to perform ROS: Severe respiratory distress   Respiratory: Positive for shortness of breath. Negative for cough.    Cardiovascular: Negative for chest pain.   Gastrointestinal: Negative for abdominal pain and vomiting.            PHYSICAL EXAM  ED Triage Vitals   Temp Heart Rate Resp BP SpO2   12/15/17 1636 12/15/17 1543 12/15/17 1543 12/15/17 1543 12/15/17 1543   98.3 °F (36.8 °C) 138 36 136/94 97 %      Temp src Heart Rate Source Patient Position BP Location FiO2 (%)   12/15/17 1636 12/15/17 1903 -- -- --   Tympanic Monitor            Physical Exam   Constitutional: He is oriented to person, place, and time. He appears distressed.   HENT:   Head: Normocephalic.   Mouth/Throat: Mucous membranes are normal.   Eyes: EOM are normal. Pupils are equal, round, and reactive to light.   Neck: Normal range of motion. Neck supple.   Cardiovascular: Regular rhythm and normal heart sounds.  Tachycardia present.    Pulmonary/Chest: Tachypnea noted. He is in respiratory distress (severe). He has decreased breath sounds in the right upper field, the right middle field, the right lower field, the left upper field, the left middle field and the left lower field. He has wheezes (bilaterally). He has no rhonchi. He has no rales.   Prolonged expiratory phase.    Abdominal: Soft. There is no tenderness. There is no rebound and no guarding.   Musculoskeletal: Normal range of motion.    Neurological: He is alert and oriented to person, place, and time. He has normal sensation.   Skin: Skin is warm and dry.   Psychiatric: Mood and affect normal.   Nursing note and vitals reviewed.          LAB RESULTS  Recent Results (from the past 24 hour(s))   Comprehensive Metabolic Panel    Collection Time: 12/15/17  3:53 PM   Result Value Ref Range    Glucose 111 (H) 65 - 99 mg/dL    BUN 13 6 - 20 mg/dL    Creatinine 1.10 0.76 - 1.27 mg/dL    Sodium 140 136 - 145 mmol/L    Potassium 3.9 3.5 - 5.2 mmol/L    Chloride 102 98 - 107 mmol/L    CO2 23.1 22.0 - 29.0 mmol/L    Calcium 9.5 8.6 - 10.5 mg/dL    Total Protein 8.2 6.0 - 8.5 g/dL    Albumin 4.50 3.50 - 5.20 g/dL    ALT (SGPT) 13 1 - 41 U/L    AST (SGOT) 15 1 - 40 U/L    Alkaline Phosphatase 51 39 - 117 U/L    Total Bilirubin 0.6 0.1 - 1.2 mg/dL    eGFR  African Amer 95 >60 mL/min/1.73    Globulin 3.7 gm/dL    A/G Ratio 1.2 g/dL    BUN/Creatinine Ratio 11.8 7.0 - 25.0    Anion Gap 14.9 mmol/L   CBC Auto Differential    Collection Time: 12/15/17  3:53 PM   Result Value Ref Range    WBC 12.79 (H) 4.50 - 10.70 10*3/mm3    RBC 5.22 4.60 - 6.00 10*6/mm3    Hemoglobin 15.4 13.7 - 17.6 g/dL    Hematocrit 47.2 40.4 - 52.2 %    MCV 90.4 79.8 - 96.2 fL    MCH 29.5 27.0 - 32.7 pg    MCHC 32.6 32.6 - 36.4 g/dL    RDW 14.0 11.5 - 14.5 %    RDW-SD 46.5 37.0 - 54.0 fl    MPV 10.6 6.0 - 12.0 fL    Platelets 238 140 - 500 10*3/mm3    Neutrophil % 55.1 42.7 - 76.0 %    Lymphocyte % 31.8 19.6 - 45.3 %    Monocyte % 9.2 5.0 - 12.0 %    Eosinophil % 3.5 0.3 - 6.2 %    Basophil % 0.2 0.0 - 1.5 %    Immature Grans % 0.2 0.0 - 0.5 %    Neutrophils, Absolute 7.03 1.90 - 8.10 10*3/mm3    Lymphocytes, Absolute 4.07 0.90 - 4.80 10*3/mm3    Monocytes, Absolute 1.18 0.20 - 1.20 10*3/mm3    Eosinophils, Absolute 0.45 0.00 - 0.70 10*3/mm3    Basophils, Absolute 0.03 0.00 - 0.20 10*3/mm3    Immature Grans, Absolute 0.03 0.00 - 0.03 10*3/mm3       Ordered the above labs and reviewed the  results.        RADIOLOGY  XR Chest 1 View        The lungs are well-expanded and clear and the heart and hilar structures  are normal. There is no acute disease or change from 11/11/2017.    Interpreted by radiologist. Independently viewed by me.                Ordered the above noted radiological studies. Reviewed by me in PACS.       PROCEDURES  Critical Care  Performed by: CHARLOTTE YOUNG  Authorized by: CHARLOTTE YOUNG     Critical care provider statement:     Critical care time (minutes):  30    Critical care time was exclusive of:  Separately billable procedures and treating other patients    Critical care was necessary to treat or prevent imminent or life-threatening deterioration of the following conditions:  Respiratory failure    Critical care was time spent personally by me on the following activities:  Development of treatment plan with patient or surrogate, evaluation of patient's response to treatment, examination of patient, obtaining history from patient or surrogate, ordering and performing treatments and interventions, ordering and review of laboratory studies, ordering and review of radiographic studies, pulse oximetry and re-evaluation of patient's condition          EKG:           EKG time: 16:22  Rhythm/Rate: Sinus tachycardia rate 131  P waves and AK: Normal P waves  QRS, axis: Normal QRS   ST and T waves: Normal ST     Interpreted Contemporaneously by me, independently viewed  Similar compared to prior 07/06/17          PROGRESS AND CONSULTS  ED Course   Comment By Time   6:47 PM  Patient with asthma exacerbation.  Patient with significant wheezing on arrival.  Given three breathing treatments and then continuous albuterol.  Given magnesium.  Patient now feels better.  I have told patient that I think he should be admitted.  Patient states he feels that he is baseline and wants to go home.  States he wants steroids and atrovent.  Patient also understands that he can return for any  concerns.  Patient is alert and has decision making capacity. Gabriel Kumar MD 12/15 1850     3:32 PM:  Blood work, CXR, and EKG ordered for further evaluation. Magnesium and albuterol nebulizer ordered to treat for dyspnea.     4:14 PM:  Rechecked pt. Pt is currently on high-flow nasal cannula. Pt will be administered continuous albuterol.    6:44 PM:  Rechecked pt. Pt has been administered breathing treatments x3 in the ER. Pt reports that he feels significantly better after administration of the breathing treatments. On re-evaluation, pt's respiratory rate has improved. Pt is no longer tachypneic. Pt's wheezes have decreased. Pt's HR is in the 110s. Pt reports that that he is currently breathing at his baseline. Pt was offered admission to the hospital for further management, which pt declines at this time. Pt will be prescribed with rx for atrovent and steroids. Pt was strongly advised to RTER for worsening dyspnea. Pt was advised to f/u with PMD. Pt agrees with plan for discharge.            MEDICAL DECISION MAKING        MDM  Number of Diagnoses or Management Options  Severe persistent asthma with acute exacerbation:      Amount and/or Complexity of Data Reviewed  Clinical lab tests: ordered and reviewed (WBC is 12.79.)  Tests in the radiology section of CPT®: ordered and reviewed (CXR:   The lungs are well-expanded and clear and the heart and hilar structures are normal. There is no acute disease or change from 11/11/2017.)  Tests in the medicine section of CPT®: reviewed and ordered (EKG interpreted.   )    Patient Progress  Patient progress: stable             DIAGNOSIS  Final diagnoses:   Severe persistent asthma with acute exacerbation         DISPOSITION  Pt discharged.      DISCHARGE    Patient discharged in stable condition.    Reviewed implications of results, diagnosis, meds, responsibility to follow up, warning signs and symptoms of possible worsening, potential complications and reasons to  return to ER.    Patient/Family voiced understanding of above instructions.    Discussed plan for discharge, as there is no emergent indication for admission.  Pt/family is agreeable and understands need for follow up and repeat testing.  Pt is aware that discharge does not mean that nothing is wrong but it indicates no emergency is present that requires admission and they must continue care with follow-up as given below or physician of their choice.     FOLLOW-UP  Diane De Jesus MD  [address] [id number] [phone]    In 2 days           Medication List      New Prescriptions          ipratropium 17 MCG/ACT inhaler   Commonly known as:  ATROVENT HFA   Inhale 2 puffs 4 (Four) Times a Day.         Changed          predniSONE 20 MG tablet   Commonly known as:  DELTASONE   Take 1 tablet by mouth 2 (Two) Times a Day.   What changed:    - how much to take  - how to take this  - when to take this  - additional instructions                       Latest Documented Vital Signs:  As of 6:58 PM  BP- 150/98 HR- (!) 124 Temp- 98.3 °F (36.8 °C) (Tympanic) O2 sat- 97%        --  Documentation assistance provided by mike Cuenca for Dr. José MD.  Information recorded by the scribe was done at my direction and has been verified and validated by me.                  Duncan Cuenca  12/15/17 2045       Gabriel Kumar MD  12/15/17 2059

## 2018-07-29 ENCOUNTER — APPOINTMENT (OUTPATIENT)
Dept: GENERAL RADIOLOGY | Facility: HOSPITAL | Age: 33
End: 2018-07-29

## 2018-07-29 ENCOUNTER — HOSPITAL ENCOUNTER (INPATIENT)
Facility: HOSPITAL | Age: 33
LOS: 2 days | Discharge: HOME | End: 2018-07-31
Attending: EMERGENCY MEDICINE | Admitting: INTERNAL MEDICINE

## 2018-07-29 DIAGNOSIS — J44.1 COPD EXACERBATION (HCC): Primary | ICD-10-CM

## 2018-07-29 LAB
ANION GAP SERPL CALCULATED.3IONS-SCNC: 14.5 MMOL/L
ARTERIAL PATENCY WRIST A: POSITIVE
ATMOSPHERIC PRESS: 752.8 MMHG
ATMOSPHERIC PRESS: 753.4 MMHG
BASE EXCESS BLDA CALC-SCNC: -0.8 MMOL/L (ref 0–2)
BASE EXCESS BLDV CALC-SCNC: -0.5 MMOL/L
BASOPHILS # BLD AUTO: 0.02 10*3/MM3 (ref 0–0.2)
BASOPHILS NFR BLD AUTO: 0.2 % (ref 0–1.5)
BDY SITE: ABNORMAL
BDY SITE: ABNORMAL
BUN BLD-MCNC: 10 MG/DL (ref 6–20)
BUN/CREAT SERPL: 9.3 (ref 7–25)
CALCIUM SPEC-SCNC: 9.7 MG/DL (ref 8.6–10.5)
CHLORIDE SERPL-SCNC: 104 MMOL/L (ref 98–107)
CO2 SERPL-SCNC: 22.5 MMOL/L (ref 22–29)
CREAT BLD-MCNC: 1.07 MG/DL (ref 0.76–1.27)
DEPRECATED RDW RBC AUTO: 48.4 FL (ref 37–54)
EOSINOPHIL # BLD AUTO: 0.03 10*3/MM3 (ref 0–0.7)
EOSINOPHIL NFR BLD AUTO: 0.3 % (ref 0.3–6.2)
ERYTHROCYTE [DISTWIDTH] IN BLOOD BY AUTOMATED COUNT: 14.9 % (ref 11.5–14.5)
GAS FLOW AIRWAY: 9 LPM
GAS FLOW AIRWAY: 9 LPM
GFR SERPL CREATININE-BSD FRML MDRD: 98 ML/MIN/1.73
GLUCOSE BLD-MCNC: 120 MG/DL (ref 65–99)
HCO3 BLDA-SCNC: 25 MMOL/L (ref 22–28)
HCO3 BLDV-SCNC: 23.1 MMOL/L (ref 22–28)
HCT VFR BLD AUTO: 45.3 % (ref 40.4–52.2)
HGB BLD-MCNC: 14.5 G/DL (ref 13.7–17.6)
IMM GRANULOCYTES # BLD: 0.02 10*3/MM3 (ref 0–0.03)
IMM GRANULOCYTES NFR BLD: 0.2 % (ref 0–0.5)
LYMPHOCYTES # BLD AUTO: 0.47 10*3/MM3 (ref 0.9–4.8)
LYMPHOCYTES NFR BLD AUTO: 4.3 % (ref 19.6–45.3)
MCH RBC QN AUTO: 28.4 PG (ref 27–32.7)
MCHC RBC AUTO-ENTMCNC: 32 G/DL (ref 32.6–36.4)
MCV RBC AUTO: 88.8 FL (ref 79.8–96.2)
MODALITY: ABNORMAL
MODALITY: ABNORMAL
MONOCYTES # BLD AUTO: 0.05 10*3/MM3 (ref 0.2–1.2)
MONOCYTES NFR BLD AUTO: 0.5 % (ref 5–12)
NEUTROPHILS # BLD AUTO: 10.36 10*3/MM3 (ref 1.9–8.1)
NEUTROPHILS NFR BLD AUTO: 94.5 % (ref 42.7–76)
PCO2 BLDA: 43.9 MM HG (ref 35–45)
PCO2 BLDV: 34.5 MM HG (ref 41–51)
PH BLDA: 7.36 PH UNITS (ref 7.35–7.45)
PH BLDV: 7.43 PH UNITS (ref 7.31–7.41)
PLATELET # BLD AUTO: 233 10*3/MM3 (ref 140–500)
PMV BLD AUTO: 11.2 FL (ref 6–12)
PO2 BLDA: 123.3 MM HG (ref 80–100)
PO2 BLDV: 80.6 MM HG (ref 35–45)
POTASSIUM BLD-SCNC: 4.1 MMOL/L (ref 3.5–5.2)
RBC # BLD AUTO: 5.1 10*6/MM3 (ref 4.6–6)
SAO2 % BLDCOA: 96.3 % (ref 92–99)
SAO2 % BLDCOA: 98.6 % (ref 92–99)
SODIUM BLD-SCNC: 141 MMOL/L (ref 136–145)
TOTAL RATE: 24 BREATHS/MINUTE
TOTAL RATE: 24 BREATHS/MINUTE
WBC NRBC COR # BLD: 10.95 10*3/MM3 (ref 4.5–10.7)

## 2018-07-29 PROCEDURE — 94640 AIRWAY INHALATION TREATMENT: CPT

## 2018-07-29 PROCEDURE — 71045 X-RAY EXAM CHEST 1 VIEW: CPT

## 2018-07-29 PROCEDURE — 99285 EMERGENCY DEPT VISIT HI MDM: CPT

## 2018-07-29 PROCEDURE — 82803 BLOOD GASES ANY COMBINATION: CPT

## 2018-07-29 PROCEDURE — 94644 CONT INHLJ TX 1ST HOUR: CPT

## 2018-07-29 PROCEDURE — 85025 COMPLETE CBC W/AUTO DIFF WBC: CPT | Performed by: PHYSICIAN ASSISTANT

## 2018-07-29 PROCEDURE — 94799 UNLISTED PULMONARY SVC/PX: CPT

## 2018-07-29 PROCEDURE — 25010000002 MAGNESIUM SULFATE 2 GM/50ML SOLUTION: Performed by: EMERGENCY MEDICINE

## 2018-07-29 PROCEDURE — 80048 BASIC METABOLIC PNL TOTAL CA: CPT | Performed by: PHYSICIAN ASSISTANT

## 2018-07-29 PROCEDURE — 36600 WITHDRAWAL OF ARTERIAL BLOOD: CPT

## 2018-07-29 RX ORDER — ALBUTEROL SULFATE 2.5 MG/3ML
10 SOLUTION RESPIRATORY (INHALATION) CONTINUOUS
Status: DISPENSED | OUTPATIENT
Start: 2018-07-29 | End: 2018-07-29

## 2018-07-29 RX ORDER — METHYLPREDNISOLONE SODIUM SUCCINATE 125 MG/2ML
125 INJECTION, POWDER, LYOPHILIZED, FOR SOLUTION INTRAMUSCULAR; INTRAVENOUS ONCE
Status: COMPLETED | OUTPATIENT
Start: 2018-07-30 | End: 2018-07-30

## 2018-07-29 RX ORDER — ALBUTEROL SULFATE 2.5 MG/3ML
2.5 SOLUTION RESPIRATORY (INHALATION)
Status: COMPLETED | OUTPATIENT
Start: 2018-07-29 | End: 2018-07-29

## 2018-07-29 RX ORDER — SODIUM CHLORIDE 0.9 % (FLUSH) 0.9 %
10 SYRINGE (ML) INJECTION AS NEEDED
Status: DISCONTINUED | OUTPATIENT
Start: 2018-07-29 | End: 2018-08-01 | Stop reason: HOSPADM

## 2018-07-29 RX ORDER — MAGNESIUM SULFATE HEPTAHYDRATE 40 MG/ML
2 INJECTION, SOLUTION INTRAVENOUS ONCE
Status: COMPLETED | OUTPATIENT
Start: 2018-07-29 | End: 2018-07-29

## 2018-07-29 RX ADMIN — ALBUTEROL SULFATE 2.5 MG: 2.5 SOLUTION RESPIRATORY (INHALATION) at 20:17

## 2018-07-29 RX ADMIN — MAGNESIUM SULFATE HEPTAHYDRATE 2 G: 40 INJECTION, SOLUTION INTRAVENOUS at 22:33

## 2018-07-29 RX ADMIN — ALBUTEROL SULFATE 2.5 MG: 2.5 SOLUTION RESPIRATORY (INHALATION) at 20:42

## 2018-07-29 RX ADMIN — ALBUTEROL SULFATE 2.5 MG: 2.5 SOLUTION RESPIRATORY (INHALATION) at 20:51

## 2018-07-29 RX ADMIN — ALBUTEROL SULFATE 2.5 MG: 2.5 SOLUTION RESPIRATORY (INHALATION) at 20:22

## 2018-07-29 RX ADMIN — ALBUTEROL SULFATE 10 MG: 2.5 SOLUTION RESPIRATORY (INHALATION) at 21:10

## 2018-07-30 LAB
GLUCOSE BLDC GLUCOMTR-MCNC: 114 MG/DL (ref 70–130)
GLUCOSE BLDC GLUCOMTR-MCNC: 119 MG/DL (ref 70–130)
GLUCOSE BLDC GLUCOMTR-MCNC: 128 MG/DL (ref 70–130)
GLUCOSE BLDC GLUCOMTR-MCNC: 160 MG/DL (ref 70–130)

## 2018-07-30 PROCEDURE — 94799 UNLISTED PULMONARY SVC/PX: CPT

## 2018-07-30 PROCEDURE — 94640 AIRWAY INHALATION TREATMENT: CPT

## 2018-07-30 PROCEDURE — 87486 CHLMYD PNEUM DNA AMP PROBE: CPT | Performed by: INTERNAL MEDICINE

## 2018-07-30 PROCEDURE — 87633 RESP VIRUS 12-25 TARGETS: CPT | Performed by: INTERNAL MEDICINE

## 2018-07-30 PROCEDURE — 87581 M.PNEUMON DNA AMP PROBE: CPT | Performed by: INTERNAL MEDICINE

## 2018-07-30 PROCEDURE — 25010000002 ENOXAPARIN PER 10 MG: Performed by: INTERNAL MEDICINE

## 2018-07-30 PROCEDURE — 25010000002 METHYLPREDNISOLONE PER 125 MG: Performed by: INTERNAL MEDICINE

## 2018-07-30 PROCEDURE — 87205 SMEAR GRAM STAIN: CPT | Performed by: INTERNAL MEDICINE

## 2018-07-30 PROCEDURE — 87798 DETECT AGENT NOS DNA AMP: CPT | Performed by: INTERNAL MEDICINE

## 2018-07-30 PROCEDURE — 94660 CPAP INITIATION&MGMT: CPT

## 2018-07-30 PROCEDURE — 82962 GLUCOSE BLOOD TEST: CPT

## 2018-07-30 PROCEDURE — 25010000002 LORAZEPAM PER 2 MG: Performed by: INTERNAL MEDICINE

## 2018-07-30 PROCEDURE — 87070 CULTURE OTHR SPECIMN AEROBIC: CPT | Performed by: INTERNAL MEDICINE

## 2018-07-30 RX ORDER — FAMOTIDINE 40 MG/1
40 TABLET, FILM COATED ORAL DAILY
Status: DISCONTINUED | OUTPATIENT
Start: 2018-07-30 | End: 2018-08-01 | Stop reason: HOSPADM

## 2018-07-30 RX ORDER — PREDNISONE 20 MG/1
40 TABLET ORAL
Status: DISCONTINUED | OUTPATIENT
Start: 2018-07-31 | End: 2018-08-01 | Stop reason: HOSPADM

## 2018-07-30 RX ORDER — BUDESONIDE AND FORMOTEROL FUMARATE DIHYDRATE 160; 4.5 UG/1; UG/1
2 AEROSOL RESPIRATORY (INHALATION)
Status: DISCONTINUED | OUTPATIENT
Start: 2018-07-30 | End: 2018-08-01 | Stop reason: HOSPADM

## 2018-07-30 RX ORDER — DEXTROSE MONOHYDRATE 25 G/50ML
25 INJECTION, SOLUTION INTRAVENOUS
Status: DISCONTINUED | OUTPATIENT
Start: 2018-07-30 | End: 2018-08-01 | Stop reason: HOSPADM

## 2018-07-30 RX ORDER — IPRATROPIUM BROMIDE AND ALBUTEROL SULFATE 2.5; .5 MG/3ML; MG/3ML
3 SOLUTION RESPIRATORY (INHALATION)
Status: DISCONTINUED | OUTPATIENT
Start: 2018-07-30 | End: 2018-08-01 | Stop reason: HOSPADM

## 2018-07-30 RX ORDER — SODIUM CHLORIDE 0.9 % (FLUSH) 0.9 %
1-10 SYRINGE (ML) INJECTION AS NEEDED
Status: DISCONTINUED | OUTPATIENT
Start: 2018-07-30 | End: 2018-08-01 | Stop reason: HOSPADM

## 2018-07-30 RX ORDER — HYDROCHLOROTHIAZIDE 12.5 MG/1
12.5 CAPSULE, GELATIN COATED ORAL DAILY
Status: DISCONTINUED | OUTPATIENT
Start: 2018-07-30 | End: 2018-08-01 | Stop reason: HOSPADM

## 2018-07-30 RX ORDER — NICOTINE POLACRILEX 4 MG
15 LOZENGE BUCCAL
Status: DISCONTINUED | OUTPATIENT
Start: 2018-07-30 | End: 2018-08-01 | Stop reason: HOSPADM

## 2018-07-30 RX ORDER — HYDROCHLOROTHIAZIDE 12.5 MG/1
12.5 CAPSULE, GELATIN COATED ORAL DAILY
COMMUNITY
End: 2022-11-13 | Stop reason: ALTCHOICE

## 2018-07-30 RX ORDER — AZITHROMYCIN 250 MG/1
250 TABLET, FILM COATED ORAL
Status: DISCONTINUED | OUTPATIENT
Start: 2018-07-30 | End: 2018-08-01 | Stop reason: HOSPADM

## 2018-07-30 RX ORDER — ONDANSETRON 4 MG/1
4 TABLET, FILM COATED ORAL EVERY 6 HOURS PRN
Status: DISCONTINUED | OUTPATIENT
Start: 2018-07-30 | End: 2018-08-01 | Stop reason: HOSPADM

## 2018-07-30 RX ORDER — LISINOPRIL 5 MG/1
5 TABLET ORAL DAILY
Status: DISCONTINUED | OUTPATIENT
Start: 2018-07-30 | End: 2018-08-01 | Stop reason: HOSPADM

## 2018-07-30 RX ORDER — LORAZEPAM 2 MG/ML
0.5 INJECTION INTRAMUSCULAR ONCE
Status: COMPLETED | OUTPATIENT
Start: 2018-07-30 | End: 2018-07-30

## 2018-07-30 RX ORDER — ONDANSETRON 4 MG/1
4 TABLET, ORALLY DISINTEGRATING ORAL EVERY 6 HOURS PRN
Status: DISCONTINUED | OUTPATIENT
Start: 2018-07-30 | End: 2018-08-01 | Stop reason: HOSPADM

## 2018-07-30 RX ORDER — FLUTICASONE PROPIONATE 50 MCG
2 SPRAY, SUSPENSION (ML) NASAL DAILY
Status: DISCONTINUED | OUTPATIENT
Start: 2018-07-30 | End: 2018-08-01 | Stop reason: HOSPADM

## 2018-07-30 RX ORDER — ONDANSETRON 2 MG/ML
4 INJECTION INTRAMUSCULAR; INTRAVENOUS EVERY 6 HOURS PRN
Status: DISCONTINUED | OUTPATIENT
Start: 2018-07-30 | End: 2018-08-01 | Stop reason: HOSPADM

## 2018-07-30 RX ORDER — OXYCODONE HYDROCHLORIDE AND ACETAMINOPHEN 5; 325 MG/1; MG/1
1 TABLET ORAL EVERY 6 HOURS PRN
Status: DISCONTINUED | OUTPATIENT
Start: 2018-07-30 | End: 2018-08-01 | Stop reason: HOSPADM

## 2018-07-30 RX ORDER — METHYLPREDNISOLONE SODIUM SUCCINATE 125 MG/2ML
60 INJECTION, POWDER, LYOPHILIZED, FOR SOLUTION INTRAMUSCULAR; INTRAVENOUS EVERY 8 HOURS
Status: DISCONTINUED | OUTPATIENT
Start: 2018-07-30 | End: 2018-07-30

## 2018-07-30 RX ORDER — NITROGLYCERIN 0.4 MG/1
0.4 TABLET SUBLINGUAL
Status: DISCONTINUED | OUTPATIENT
Start: 2018-07-30 | End: 2018-08-01 | Stop reason: HOSPADM

## 2018-07-30 RX ORDER — MONTELUKAST SODIUM 10 MG/1
10 TABLET ORAL NIGHTLY
Status: DISCONTINUED | OUTPATIENT
Start: 2018-07-30 | End: 2018-08-01 | Stop reason: HOSPADM

## 2018-07-30 RX ORDER — CETIRIZINE HYDROCHLORIDE 10 MG/1
10 TABLET ORAL DAILY
Status: DISCONTINUED | OUTPATIENT
Start: 2018-07-30 | End: 2018-08-01 | Stop reason: HOSPADM

## 2018-07-30 RX ADMIN — CETIRIZINE HYDROCHLORIDE 10 MG: 10 TABLET, FILM COATED ORAL at 08:18

## 2018-07-30 RX ADMIN — LISINOPRIL 5 MG: 5 TABLET ORAL at 08:17

## 2018-07-30 RX ADMIN — IPRATROPIUM BROMIDE AND ALBUTEROL SULFATE 3 ML: .5; 3 SOLUTION RESPIRATORY (INHALATION) at 20:17

## 2018-07-30 RX ADMIN — METHYLPREDNISOLONE SODIUM SUCCINATE 125 MG: 125 INJECTION, POWDER, FOR SOLUTION INTRAMUSCULAR; INTRAVENOUS at 00:03

## 2018-07-30 RX ADMIN — METHYLPREDNISOLONE SODIUM SUCCINATE 60 MG: 125 INJECTION, POWDER, FOR SOLUTION INTRAMUSCULAR; INTRAVENOUS at 08:21

## 2018-07-30 RX ADMIN — IPRATROPIUM BROMIDE AND ALBUTEROL SULFATE 3 ML: .5; 3 SOLUTION RESPIRATORY (INHALATION) at 07:06

## 2018-07-30 RX ADMIN — MONTELUKAST SODIUM 10 MG: 10 TABLET, FILM COATED ORAL at 02:32

## 2018-07-30 RX ADMIN — LORAZEPAM 0.5 MG: 2 INJECTION INTRAMUSCULAR; INTRAVENOUS at 00:54

## 2018-07-30 RX ADMIN — IPRATROPIUM BROMIDE AND ALBUTEROL SULFATE 3 ML: .5; 3 SOLUTION RESPIRATORY (INHALATION) at 15:09

## 2018-07-30 RX ADMIN — ENOXAPARIN SODIUM 40 MG: 40 INJECTION SUBCUTANEOUS at 02:32

## 2018-07-30 RX ADMIN — OXYCODONE HYDROCHLORIDE AND ACETAMINOPHEN 1 TABLET: 5; 325 TABLET ORAL at 00:55

## 2018-07-30 RX ADMIN — IPRATROPIUM BROMIDE AND ALBUTEROL SULFATE 3 ML: .5; 3 SOLUTION RESPIRATORY (INHALATION) at 02:43

## 2018-07-30 RX ADMIN — AZITHROMYCIN 250 MG: 250 TABLET, FILM COATED ORAL at 08:17

## 2018-07-30 RX ADMIN — HYDROCHLOROTHIAZIDE 12.5 MG: 12.5 CAPSULE, GELATIN COATED ORAL at 08:18

## 2018-07-30 RX ADMIN — FAMOTIDINE 40 MG: 40 TABLET, FILM COATED ORAL at 08:18

## 2018-07-30 RX ADMIN — MONTELUKAST SODIUM 10 MG: 10 TABLET, FILM COATED ORAL at 21:02

## 2018-07-30 RX ADMIN — FLUTICASONE PROPIONATE 2 SPRAY: 50 SPRAY, METERED NASAL at 18:37

## 2018-07-30 RX ADMIN — IPRATROPIUM BROMIDE AND ALBUTEROL SULFATE 3 ML: .5; 3 SOLUTION RESPIRATORY (INHALATION) at 11:14

## 2018-07-31 VITALS
SYSTOLIC BLOOD PRESSURE: 109 MMHG | BODY MASS INDEX: 20.17 KG/M2 | HEART RATE: 68 BPM | OXYGEN SATURATION: 94 % | WEIGHT: 136.2 LBS | DIASTOLIC BLOOD PRESSURE: 67 MMHG | TEMPERATURE: 97.2 F | RESPIRATION RATE: 16 BRPM | HEIGHT: 69 IN

## 2018-07-31 LAB
B PERT DNA SPEC QL NAA+PROBE: NOT DETECTED
C PNEUM DNA NPH QL NAA+NON-PROBE: NOT DETECTED
FLUAV H1 2009 PAND RNA NPH QL NAA+PROBE: NOT DETECTED
FLUAV H1 HA GENE NPH QL NAA+PROBE: NOT DETECTED
FLUAV H3 RNA NPH QL NAA+PROBE: NOT DETECTED
FLUAV SUBTYP SPEC NAA+PROBE: NOT DETECTED
FLUBV RNA ISLT QL NAA+PROBE: NOT DETECTED
GLUCOSE BLDC GLUCOMTR-MCNC: 100 MG/DL (ref 70–130)
GLUCOSE BLDC GLUCOMTR-MCNC: 112 MG/DL (ref 70–130)
GLUCOSE BLDC GLUCOMTR-MCNC: 84 MG/DL (ref 70–130)
HADV DNA SPEC NAA+PROBE: NOT DETECTED
HCOV 229E RNA SPEC QL NAA+PROBE: NOT DETECTED
HCOV HKU1 RNA SPEC QL NAA+PROBE: NOT DETECTED
HCOV NL63 RNA SPEC QL NAA+PROBE: NOT DETECTED
HCOV OC43 RNA SPEC QL NAA+PROBE: NOT DETECTED
HMPV RNA NPH QL NAA+NON-PROBE: NOT DETECTED
HPIV1 RNA SPEC QL NAA+PROBE: NOT DETECTED
HPIV2 RNA SPEC QL NAA+PROBE: NOT DETECTED
HPIV3 RNA NPH QL NAA+PROBE: NOT DETECTED
HPIV4 P GENE NPH QL NAA+PROBE: NOT DETECTED
M PNEUMO IGG SER IA-ACNC: NOT DETECTED
RHINOVIRUS RNA SPEC NAA+PROBE: DETECTED
RSV RNA NPH QL NAA+NON-PROBE: NOT DETECTED

## 2018-07-31 PROCEDURE — 82962 GLUCOSE BLOOD TEST: CPT

## 2018-07-31 PROCEDURE — 94799 UNLISTED PULMONARY SVC/PX: CPT

## 2018-07-31 PROCEDURE — 63710000001 PREDNISONE PER 1 MG: Performed by: INTERNAL MEDICINE

## 2018-07-31 PROCEDURE — 94618 PULMONARY STRESS TESTING: CPT

## 2018-07-31 RX ORDER — PREDNISONE 10 MG/1
TABLET ORAL
Qty: 31 TABLET | Refills: 0 | Status: ON HOLD | OUTPATIENT
Start: 2018-08-01 | End: 2022-10-14

## 2018-07-31 RX ORDER — AZITHROMYCIN 250 MG/1
TABLET, FILM COATED ORAL
Qty: 5 TABLET | Refills: 0 | Status: ON HOLD | OUTPATIENT
Start: 2018-08-01 | End: 2022-10-14

## 2018-07-31 RX ADMIN — LISINOPRIL 5 MG: 5 TABLET ORAL at 10:47

## 2018-07-31 RX ADMIN — IPRATROPIUM BROMIDE AND ALBUTEROL SULFATE 3 ML: .5; 3 SOLUTION RESPIRATORY (INHALATION) at 14:42

## 2018-07-31 RX ADMIN — FAMOTIDINE 40 MG: 40 TABLET, FILM COATED ORAL at 10:47

## 2018-07-31 RX ADMIN — CETIRIZINE HYDROCHLORIDE 10 MG: 10 TABLET, FILM COATED ORAL at 10:47

## 2018-07-31 RX ADMIN — FLUTICASONE PROPIONATE 2 SPRAY: 50 SPRAY, METERED NASAL at 10:48

## 2018-07-31 RX ADMIN — HYDROCHLOROTHIAZIDE 12.5 MG: 12.5 CAPSULE, GELATIN COATED ORAL at 10:46

## 2018-07-31 RX ADMIN — PREDNISONE 40 MG: 20 TABLET ORAL at 10:47

## 2018-07-31 RX ADMIN — AZITHROMYCIN 250 MG: 250 TABLET, FILM COATED ORAL at 10:47

## 2018-07-31 RX ADMIN — IPRATROPIUM BROMIDE AND ALBUTEROL SULFATE 3 ML: .5; 3 SOLUTION RESPIRATORY (INHALATION) at 10:28

## 2018-07-31 RX ADMIN — OXYCODONE HYDROCHLORIDE AND ACETAMINOPHEN 1 TABLET: 5; 325 TABLET ORAL at 11:06

## 2018-07-31 RX ADMIN — IPRATROPIUM BROMIDE AND ALBUTEROL SULFATE 3 ML: .5; 3 SOLUTION RESPIRATORY (INHALATION) at 20:03

## 2018-07-31 RX ADMIN — IPRATROPIUM BROMIDE AND ALBUTEROL SULFATE 3 ML: .5; 3 SOLUTION RESPIRATORY (INHALATION) at 06:30

## 2018-08-01 ENCOUNTER — READMISSION MANAGEMENT (OUTPATIENT)
Dept: CALL CENTER | Facility: HOSPITAL | Age: 33
End: 2018-08-01

## 2018-08-01 LAB
BACTERIA SPEC RESP CULT: NORMAL
GRAM STN SPEC: NORMAL

## 2018-08-01 NOTE — PROGRESS NOTES
Case Management Discharge Note    Final Note: Home with no needs    Destination     No service has been selected for the patient.      Durable Medical Equipment     No service has been selected for the patient.      Dialysis/Infusion     No service has been selected for the patient.      Home Medical Care     No service has been selected for the patient.      Social Care     No service has been selected for the patient.             Final Discharge Disposition Code: 01 - home or self-care

## 2018-08-01 NOTE — OUTREACH NOTE
Prep Survey      Responses   Facility patient discharged from?  [facility]   Is patient eligible?  Yes   Discharge diagnosis  Acute exacerbation of asthma with status asthmaticus progressed into COPD    Does the patient have one of the following disease processes/diagnoses(primary or secondary)?  COPD/Pneumonia   Does the patient have Home health ordered?  No   Is there a DME ordered?  No   Prep survey completed?  Yes          Adela Álvarez RN

## 2018-08-02 ENCOUNTER — READMISSION MANAGEMENT (OUTPATIENT)
Dept: CALL CENTER | Facility: HOSPITAL | Age: 33
End: 2018-08-02

## 2018-08-02 NOTE — OUTREACH NOTE
COPD/PN Week 1 Survey      Responses   Facility patient discharged from?  [facility]   Does the patient have one of the following disease processes/diagnoses(primary or secondary)?  COPD/Pneumonia   Is there a successful TCM telephone encounter documented?  No   Was the primary reason for admission:  COPD exacerbation   Week 1 attempt successful?  No   Unsuccessful attempts  Attempt 1          Nayeli Hutton RN

## 2018-08-03 ENCOUNTER — READMISSION MANAGEMENT (OUTPATIENT)
Dept: CALL CENTER | Facility: HOSPITAL | Age: 33
End: 2018-08-03

## 2018-08-03 NOTE — OUTREACH NOTE
COPD/PN Week 1 Survey      Responses   Facility patient discharged from?  [facility]   Does the patient have one of the following disease processes/diagnoses(primary or secondary)?  COPD/Pneumonia   Is there a successful TCM telephone encounter documented?  No   Was the primary reason for admission:  COPD exacerbation   Week 1 attempt successful?  No   Unsuccessful attempts  Attempt 2          Eliel Allen RN

## 2018-08-04 ENCOUNTER — READMISSION MANAGEMENT (OUTPATIENT)
Dept: CALL CENTER | Facility: HOSPITAL | Age: 33
End: 2018-08-04

## 2018-08-04 NOTE — OUTREACH NOTE
COPD/PN Week 1 Survey      Responses   Facility patient discharged from?  [facility]   Does the patient have one of the following disease processes/diagnoses(primary or secondary)?  COPD/Pneumonia   Is there a successful TCM telephone encounter documented?  No   Week 1 attempt successful?  Yes   Call start time  0901   Call end time  0905   Discharge diagnosis  Acute exacerbation of asthma with status asthmaticus progressed into COPD    Person spoke with today (if not patient) and relationship  Mother   Meds reviewed with patient/caregiver?  Yes   Is the patient having any side effects they believe may be caused by any medication additions or changes?  No   Does the patient have all medications ordered at discharge?  Yes   Is the patient taking all medications as directed (includes completed medication regime)?  Yes   Medication comments  Mother reports no issues.   Does the patient have an appointment with their PCP or pulmonologist within 7 days of discharge?  Yes   Has the patient kept scheduled appointments due by today?  Yes   Comments  Seen Dr Nava yesterday.   Has home health visited the patient within 72 hours of discharge?  N/A   Has all DME been delivered?  No   Additional teach back comments  Wasnt able to finish survey as she feel asleep during the call.   Week 1 call completed?  Yes          Jona Wilson RN

## 2018-08-11 ENCOUNTER — READMISSION MANAGEMENT (OUTPATIENT)
Dept: CALL CENTER | Facility: HOSPITAL | Age: 33
End: 2018-08-11

## 2018-08-11 NOTE — OUTREACH NOTE
COPD/PN Week 2 Survey      Responses   Facility patient discharged from?  [facility]   Does the patient have one of the following disease processes/diagnoses(primary or secondary)?  COPD/Pneumonia   Was the primary reason for admission:  COPD exacerbation   Week 2 attempt successful?  No   Unsuccessful attempts  Attempt 1          Eliel Allen, RN

## 2018-08-13 ENCOUNTER — READMISSION MANAGEMENT (OUTPATIENT)
Dept: CALL CENTER | Facility: HOSPITAL | Age: 33
End: 2018-08-13

## 2018-08-13 NOTE — OUTREACH NOTE
COPD/PN Week 2 Survey      Responses   Facility patient discharged from?  [facility]   Does the patient have one of the following disease processes/diagnoses(primary or secondary)?  COPD/Pneumonia   Week 2 attempt successful?  No   Unsuccessful attempts  Attempt 2          Nayeli Hutton RN

## 2018-08-15 ENCOUNTER — READMISSION MANAGEMENT (OUTPATIENT)
Dept: CALL CENTER | Facility: HOSPITAL | Age: 33
End: 2018-08-15

## 2018-08-15 NOTE — OUTREACH NOTE
COPD/PN Week 2 Survey      Responses   Facility patient discharged from?  [facility]   Does the patient have one of the following disease processes/diagnoses(primary or secondary)?  COPD/Pneumonia   Was the primary reason for admission:  COPD exacerbation   Week 2 attempt successful?  No   Unsuccessful attempts  Attempt 3          Jona Wilson RN

## 2018-08-25 ENCOUNTER — READMISSION MANAGEMENT (OUTPATIENT)
Dept: CALL CENTER | Facility: HOSPITAL | Age: 33
End: 2018-08-25

## 2018-08-25 NOTE — OUTREACH NOTE
COPD/PN Week 3 Survey      Responses   Facility patient discharged from?  [facility]   Does the patient have one of the following disease processes/diagnoses(primary or secondary)?  COPD/Pneumonia   Was the primary reason for admission:  COPD exacerbation   Week 3 attempt successful?  No   Unsuccessful attempts  Attempt 1          Fran Adamson RN

## 2018-08-27 ENCOUNTER — READMISSION MANAGEMENT (OUTPATIENT)
Dept: CALL CENTER | Facility: HOSPITAL | Age: 33
End: 2018-08-27

## 2018-08-27 NOTE — OUTREACH NOTE
COPD/PN Week 3 Survey      Responses   Facility patient discharged from?  [facility]   Does the patient have one of the following disease processes/diagnoses(primary or secondary)?  COPD/Pneumonia   Was the primary reason for admission:  COPD exacerbation   Week 3 attempt successful?  No   Revoke  Decline to participate [UTR after multiple attempts - revoked]          Radha El RN

## 2020-11-07 ENCOUNTER — APPOINTMENT (OUTPATIENT)
Dept: GENERAL RADIOLOGY | Facility: HOSPITAL | Age: 35
End: 2020-11-07

## 2020-11-07 ENCOUNTER — HOSPITAL ENCOUNTER (EMERGENCY)
Facility: HOSPITAL | Age: 35
Discharge: HOME | End: 2020-11-07
Attending: EMERGENCY MEDICINE | Admitting: EMERGENCY MEDICINE

## 2020-11-07 VITALS
TEMPERATURE: 97.4 F | BODY MASS INDEX: 21.22 KG/M2 | RESPIRATION RATE: 18 BRPM | HEART RATE: 80 BPM | HEIGHT: 68 IN | WEIGHT: 140 LBS | OXYGEN SATURATION: 92 % | SYSTOLIC BLOOD PRESSURE: 128 MMHG | DIASTOLIC BLOOD PRESSURE: 74 MMHG

## 2020-11-07 DIAGNOSIS — J45.51 SEVERE PERSISTENT ASTHMA WITH EXACERBATION: Primary | ICD-10-CM

## 2020-11-07 LAB
ALBUMIN SERPL-MCNC: 3.9 G/DL (ref 3.5–5.2)
ALBUMIN/GLOB SERPL: 1.5 G/DL
ALP SERPL-CCNC: 42 U/L (ref 39–117)
ALT SERPL W P-5'-P-CCNC: 13 U/L (ref 1–41)
ANION GAP SERPL CALCULATED.3IONS-SCNC: 6.2 MMOL/L (ref 5–15)
AST SERPL-CCNC: 17 U/L (ref 1–40)
BASOPHILS # BLD AUTO: 0.06 10*3/MM3 (ref 0–0.2)
BASOPHILS NFR BLD AUTO: 0.8 % (ref 0–1.5)
BILIRUB SERPL-MCNC: 0.6 MG/DL (ref 0–1.2)
BUN SERPL-MCNC: 14 MG/DL (ref 6–20)
BUN/CREAT SERPL: 18.4 (ref 7–25)
CALCIUM SPEC-SCNC: 8.5 MG/DL (ref 8.6–10.5)
CHLORIDE SERPL-SCNC: 106 MMOL/L (ref 98–107)
CO2 SERPL-SCNC: 26.8 MMOL/L (ref 22–29)
CREAT SERPL-MCNC: 0.76 MG/DL (ref 0.76–1.27)
DEPRECATED RDW RBC AUTO: 44.2 FL (ref 37–54)
EOSINOPHIL # BLD AUTO: 0.91 10*3/MM3 (ref 0–0.4)
EOSINOPHIL NFR BLD AUTO: 11.6 % (ref 0.3–6.2)
ERYTHROCYTE [DISTWIDTH] IN BLOOD BY AUTOMATED COUNT: 14.7 % (ref 12.3–15.4)
GFR SERPL CREATININE-BSD FRML MDRD: 143 ML/MIN/1.73
GLOBULIN UR ELPH-MCNC: 2.6 GM/DL
GLUCOSE SERPL-MCNC: 98 MG/DL (ref 65–99)
HCT VFR BLD AUTO: 41.2 % (ref 37.5–51)
HGB BLD-MCNC: 13.9 G/DL (ref 13–17.7)
IMM GRANULOCYTES # BLD AUTO: 0.05 10*3/MM3 (ref 0–0.05)
IMM GRANULOCYTES NFR BLD AUTO: 0.6 % (ref 0–0.5)
LYMPHOCYTES # BLD AUTO: 2.97 10*3/MM3 (ref 0.7–3.1)
LYMPHOCYTES NFR BLD AUTO: 37.8 % (ref 19.6–45.3)
MCH RBC QN AUTO: 28.4 PG (ref 26.6–33)
MCHC RBC AUTO-ENTMCNC: 33.7 G/DL (ref 31.5–35.7)
MCV RBC AUTO: 84.1 FL (ref 79–97)
MONOCYTES # BLD AUTO: 0.62 10*3/MM3 (ref 0.1–0.9)
MONOCYTES NFR BLD AUTO: 7.9 % (ref 5–12)
NEUTROPHILS NFR BLD AUTO: 3.24 10*3/MM3 (ref 1.7–7)
NEUTROPHILS NFR BLD AUTO: 41.3 % (ref 42.7–76)
NRBC BLD AUTO-RTO: 0 /100 WBC (ref 0–0.2)
PLATELET # BLD AUTO: 214 10*3/MM3 (ref 140–450)
PMV BLD AUTO: 10.5 FL (ref 6–12)
POTASSIUM SERPL-SCNC: 3.8 MMOL/L (ref 3.5–5.2)
PROT SERPL-MCNC: 6.5 G/DL (ref 6–8.5)
RBC # BLD AUTO: 4.9 10*6/MM3 (ref 4.14–5.8)
SODIUM SERPL-SCNC: 139 MMOL/L (ref 136–145)
WBC # BLD AUTO: 7.85 10*3/MM3 (ref 3.4–10.8)

## 2020-11-07 PROCEDURE — 85025 COMPLETE CBC W/AUTO DIFF WBC: CPT | Performed by: PHYSICIAN ASSISTANT

## 2020-11-07 PROCEDURE — 94799 UNLISTED PULMONARY SVC/PX: CPT

## 2020-11-07 PROCEDURE — 25010000002 METHYLPREDNISOLONE PER 125 MG: Performed by: PHYSICIAN ASSISTANT

## 2020-11-07 PROCEDURE — 94640 AIRWAY INHALATION TREATMENT: CPT

## 2020-11-07 PROCEDURE — 96375 TX/PRO/DX INJ NEW DRUG ADDON: CPT

## 2020-11-07 PROCEDURE — 80053 COMPREHEN METABOLIC PANEL: CPT | Performed by: PHYSICIAN ASSISTANT

## 2020-11-07 PROCEDURE — 25010000002 MAGNESIUM SULFATE 2 GM/50ML SOLUTION: Performed by: PHYSICIAN ASSISTANT

## 2020-11-07 PROCEDURE — 71045 X-RAY EXAM CHEST 1 VIEW: CPT

## 2020-11-07 PROCEDURE — 99284 EMERGENCY DEPT VISIT MOD MDM: CPT

## 2020-11-07 PROCEDURE — 96365 THER/PROPH/DIAG IV INF INIT: CPT

## 2020-11-07 RX ORDER — IPRATROPIUM BROMIDE AND ALBUTEROL SULFATE 2.5; .5 MG/3ML; MG/3ML
3 SOLUTION RESPIRATORY (INHALATION) ONCE
Status: DISCONTINUED | OUTPATIENT
Start: 2020-11-07 | End: 2020-11-07

## 2020-11-07 RX ORDER — PREDNISONE 50 MG/1
50 TABLET ORAL DAILY
Qty: 5 TABLET | Refills: 0 | Status: ON HOLD | OUTPATIENT
Start: 2020-11-07 | End: 2022-10-14

## 2020-11-07 RX ORDER — ALBUTEROL SULFATE 2.5 MG/3ML
2.5 SOLUTION RESPIRATORY (INHALATION) ONCE
Status: COMPLETED | OUTPATIENT
Start: 2020-11-07 | End: 2020-11-07

## 2020-11-07 RX ORDER — SODIUM CHLORIDE 0.9 % (FLUSH) 0.9 %
10 SYRINGE (ML) INJECTION AS NEEDED
Status: DISCONTINUED | OUTPATIENT
Start: 2020-11-07 | End: 2020-11-07 | Stop reason: HOSPADM

## 2020-11-07 RX ORDER — METHYLPREDNISOLONE SODIUM SUCCINATE 125 MG/2ML
125 INJECTION, POWDER, LYOPHILIZED, FOR SOLUTION INTRAMUSCULAR; INTRAVENOUS ONCE
Status: COMPLETED | OUTPATIENT
Start: 2020-11-07 | End: 2020-11-07

## 2020-11-07 RX ORDER — IPRATROPIUM BROMIDE AND ALBUTEROL SULFATE 2.5; .5 MG/3ML; MG/3ML
3 SOLUTION RESPIRATORY (INHALATION) ONCE
Status: COMPLETED | OUTPATIENT
Start: 2020-11-07 | End: 2020-11-07

## 2020-11-07 RX ORDER — MAGNESIUM SULFATE HEPTAHYDRATE 40 MG/ML
2 INJECTION, SOLUTION INTRAVENOUS ONCE
Status: COMPLETED | OUTPATIENT
Start: 2020-11-07 | End: 2020-11-07

## 2020-11-07 RX ADMIN — MAGNESIUM SULFATE HEPTAHYDRATE 2 G: 40 INJECTION, SOLUTION INTRAVENOUS at 05:36

## 2020-11-07 RX ADMIN — IPRATROPIUM BROMIDE AND ALBUTEROL SULFATE 3 ML: 2.5; .5 SOLUTION RESPIRATORY (INHALATION) at 06:59

## 2020-11-07 RX ADMIN — METHYLPREDNISOLONE SODIUM SUCCINATE 125 MG: 125 INJECTION, POWDER, FOR SOLUTION INTRAMUSCULAR; INTRAVENOUS at 05:35

## 2020-11-07 RX ADMIN — ALBUTEROL SULFATE 2.5 MG: 2.5 SOLUTION RESPIRATORY (INHALATION) at 05:41

## 2020-11-07 NOTE — ED PROVIDER NOTES
The [facility] and I have discussed this patients history, physical exam, and treatment plan. I have reviewed the documentation and personally had a face to face interaction with the patient. I affirm the documentation and agree with the treatment and plan.  The following note describes my personal findings    This patient is a 34-year-old male with a history of asthma presenting with a roughly 2-day history of shortness of breath.  The patient states that he has been using his albuterol inhaler at home without improvement of symptoms.    Exam: This patient is resting comfortably in no distress, without gross neurological deficit.  He is afebrile with stable vital signs and nontoxic in appearance.  Pulmonary exam does show him to be in no respiratory distress but he does have diffuse expiratory wheezes present.  Cardiovascular exam shows a regular rate and rhythm without murmur.    Plan: The patient will be given a steroid injection as well as multiple nebulizer treatments to treat his asthma exacerbation.  We will obtain a chest x-ray as well and then reassess.    0630  Upon reevaluation, the patient states that he is breathing significantly better.  Vital signs are currently stable.  On repeat examination, his wheezes have diminished but are still somewhat present throughout all lung fields on expiration.  We will give him another breathing treatment and then do feel as though he will be stable for discharge following.      The patient was wearing a facemask upon entrance into the room and remained in such throughout their visit.  I was wearing PPE including a facemask, eye protection, as well as gloves at any point entering the room and throughout the visit     Karthik Gay MD  11/07/20 0630

## 2020-11-07 NOTE — ED NOTES
Pt wearing mask, RN wearing mask, gown and faceshield at this time.     Serenity Ramirez, ZAHIRA  11/07/20 0552

## 2020-11-07 NOTE — ED TRIAGE NOTES
"Pt to ED from home via EMS c/o \"waking up having an asthma attack.\" Pt reports he has a hx severe asthma, \"I've been intubated over it six times.\" Pt awake, alert, oriented in triage. EMS placed humidified O2, gave duo-neb enroute. Pt currently on 8L/min via face mask. Staff in all appropriate PPE.   "

## 2020-11-07 NOTE — ED PROVIDER NOTES
EMERGENCY DEPARTMENT ENCOUNTER    Room Number:  15/15  Date of encounter:  11/7/2020  PCP: Provider, No Known  Historian: Patient      I used full protective equipment while examining this patient.  This includes face mask, gloves and protective eyewear.  I washed my hands before entering the room and immediately upon leaving the room      HPI:  Chief Complaint: Reports of breath  A complete HPI/ROS/PMH/PSH/SH/FH are unobtainable due to: Nothing    Context: Craig Bolanos is a 34 y.o. male who presents to the ED c/o 2-day history of acute on chronic shortness of breath.  Patient has a history of severe asthma.  He states his asthma flares up every few weeks.  Patient states his symptoms became worse 2 days ago.  He states his symptoms severely worsened tonight while trying to sleep.  Patient complains of severe shortness of breath with mild cough.  He denies any fever or chest pain.  He states the symptoms are similar to his previous asthma exacerbations.  Patient states he has been intubated in the past with such exacerbations.  Patient states he only uses his albuterol inhaler and Combivent inhaler for rescue purposes.  He does not have a daily steroid medication.  Patient follows with Dr. Nava in pulmonary.    Review of Medical Records  I reviewed patient's last admission from 7/29/2018.  Patient was admitted for COPD exacerbation.    PAST MEDICAL HISTORY  Active Ambulatory Problems     Diagnosis Date Noted   • Severe persistent asthma with status asthmaticus 06/01/2017   • COPD exacerbation (CMS/Formerly [facility]) 09/17/2017   • Benign essential HTN 09/17/2017   • Hilar lymphadenopathy 09/19/2017     Resolved Ambulatory Problems     Diagnosis Date Noted   • Acute respiratory failure with hypoxia (CMS/Formerly [facility]) 09/17/2017     Past Medical History:   Diagnosis Date   • Asthma    • COPD (chronic obstructive pulmonary disease) (CMS/Formerly [facility])    • Hypertension    • Migraine          PAST SURGICAL HISTORY  History reviewed. No pertinent  surgical history.      FAMILY HISTORY  Family History   Problem Relation Age of Onset   • Diabetes Mother          SOCIAL HISTORY  Social History     Socioeconomic History   • Marital status: Single     Spouse name: Not on file   • Number of children: Not on file   • Years of education: Not on file   • Highest education level: Not on file   Tobacco Use   • Smoking status: Former Smoker     Packs/day: 1.00     Years: 10.00     Pack years: 10.00     Types: Cigarettes     Quit date: 2013     Years since quittin.4   • Smokeless tobacco: Never Used   Substance and Sexual Activity   • Alcohol use: No   • Drug use: Yes     Types: Marijuana   • Sexual activity: Defer         ALLERGIES  Patient has no known allergies.        REVIEW OF SYSTEMS  All systems reviewed and negative except for those discussed in HPI.       PHYSICAL EXAM    I have reviewed the triage vital signs and nursing notes.    ED Triage Vitals   Temp Heart Rate Resp BP SpO2   20   97.4 °F (36.3 °C) 60 20 132/90 97 %      Temp src Heart Rate Source Patient Position BP Location FiO2 (%)   20 -- -- --   Tympanic Monitor          Physical Exam  GENERAL: Alert, oriented, mild respiratory distress.  Speaking in full sentences without difficulty  HENT: head atraumatic, no nuchal rigidity  EYES: no scleral icterus, EOMI  CV: regular rhythm, regular rate, no murmur  RESPIRATORY: Severe inspiratory and expiratory wheezing.  Mild respiratory distress.  ABDOMEN: soft, nontender  MUSCULOSKELETAL: no deformity, FROM, no calf swelling or tenderness  NEURO: alert, moves all extremities, follows commands  SKIN: warm, dry        LAB RESULTS  No results found for this or any previous visit (from the past 24 hour(s)).    Ordered the above labs and independently reviewed the results.        RADIOLOGY  No Radiology Exams Resulted Within Past 24 Hours    I ordered the above noted  radiological studies. Reviewed by me and discussed with radiologist.  See dictation for official radiology interpretation.      PROCEDURES    Procedures      MEDICATIONS GIVEN IN ER    Medications   sodium chloride 0.9 % flush 10 mL (has no administration in time range)   magnesium sulfate 2g/50 mL (PREMIX) infusion (has no administration in time range)   albuterol (PROVENTIL) nebulizer solution 0.083% 2.5 mg/3mL (has no administration in time range)   methylPREDNISolone sodium succinate (SOLU-Medrol) injection 125 mg (125 mg Intravenous Given 11/7/20 0535)         PROGRESS, DATA ANALYSIS, CONSULTS, AND MEDICAL DECISION MAKING    All labs have been independently reviewed by me.  All radiology studies have been reviewed by me and discussed with radiologist dictating the report.   EKG's independently viewed and interpreted by me.  Discussion below represents my analysis of pertinent findings related to patient's condition, differential diagnosis, treatment plan and final disposition.    I have discussed case with Dr. Gay, emergency room physician.  He has performed his own bedside examination and agrees with treatment plan.    ED Course as of Nov 07 0544   Sat Nov 07, 2020   0529 Patient presents with acute asthma exacerbation starting yesterday.  Plan for IV steroids, magnesium, neb treatments.  Patient in no significant respiratory distress at this time.  Patient has sats of 95% on room air.    [EE]   0536 Care turned over to Dr. Gay pending full work-up.    [EE]   0542 WBC: 7.85 [EE]   0542 Hemoglobin: 13.9 [EE]      ED Course User Index  [EE] Kenn Velasco PA       AS OF 05:36 EST VITALS:    BP - 123/96  HR - 80  TEMP - 97.4 °F (36.3 °C) (Tympanic)  O2 SATS - 95%        DIAGNOSIS  Final diagnoses:   Severe persistent asthma with exacerbation         DISPOSITION  Pending           Kenn Velasco PA  11/07/20 0548

## 2022-10-14 ENCOUNTER — HOSPITAL ENCOUNTER (INPATIENT)
Facility: HOSPITAL | Age: 37
LOS: 1 days | Discharge: HOME | End: 2022-10-15
Attending: EMERGENCY MEDICINE | Admitting: INTERNAL MEDICINE

## 2022-10-14 ENCOUNTER — APPOINTMENT (OUTPATIENT)
Dept: GENERAL RADIOLOGY | Facility: HOSPITAL | Age: 37
End: 2022-10-14

## 2022-10-14 DIAGNOSIS — J45.52 SEVERE PERSISTENT ASTHMA WITH STATUS ASTHMATICUS: Primary | ICD-10-CM

## 2022-10-14 PROBLEM — J45.51 SEVERE PERSISTENT ASTHMA WITH EXACERBATION: Status: ACTIVE | Noted: 2022-10-14

## 2022-10-14 LAB
ALBUMIN SERPL-MCNC: 5.2 G/DL (ref 3.5–5.2)
ALBUMIN/GLOB SERPL: 1.9 G/DL
ALP SERPL-CCNC: 60 U/L (ref 39–117)
ALT SERPL W P-5'-P-CCNC: 26 U/L (ref 1–41)
AMPHET+METHAMPHET UR QL: NEGATIVE
ANION GAP SERPL CALCULATED.3IONS-SCNC: 14.7 MMOL/L (ref 5–15)
ARTERIAL PATENCY WRIST A: ABNORMAL
AST SERPL-CCNC: 24 U/L (ref 1–40)
ATMOSPHERIC PRESS: 745.8 MMHG
B PARAPERT DNA SPEC QL NAA+PROBE: NOT DETECTED
B PERT DNA SPEC QL NAA+PROBE: NOT DETECTED
BARBITURATES UR QL SCN: NEGATIVE
BASE EXCESS BLDA CALC-SCNC: -1.1 MMOL/L (ref 0–2)
BASOPHILS # BLD AUTO: 0.09 10*3/MM3 (ref 0–0.2)
BASOPHILS NFR BLD AUTO: 0.8 % (ref 0–1.5)
BDY SITE: ABNORMAL
BENZODIAZ UR QL SCN: NEGATIVE
BILIRUB SERPL-MCNC: 0.2 MG/DL (ref 0–1.2)
BUN SERPL-MCNC: 7 MG/DL (ref 6–20)
BUN/CREAT SERPL: 7.4 (ref 7–25)
C PNEUM DNA NPH QL NAA+NON-PROBE: NOT DETECTED
CALCIUM SPEC-SCNC: 9.9 MG/DL (ref 8.6–10.5)
CANNABINOIDS SERPL QL: POSITIVE
CHLORIDE SERPL-SCNC: 103 MMOL/L (ref 98–107)
CO2 SERPL-SCNC: 25.3 MMOL/L (ref 22–29)
COCAINE UR QL: NEGATIVE
CREAT SERPL-MCNC: 0.95 MG/DL (ref 0.76–1.27)
D-LACTATE SERPL-SCNC: 1.7 MMOL/L (ref 0.5–2)
DEPRECATED RDW RBC AUTO: 41.7 FL (ref 37–54)
EGFRCR SERPLBLD CKD-EPI 2021: 107 ML/MIN/1.73
EOSINOPHIL # BLD AUTO: 0.87 10*3/MM3 (ref 0–0.4)
EOSINOPHIL NFR BLD AUTO: 7.8 % (ref 0.3–6.2)
ERYTHROCYTE [DISTWIDTH] IN BLOOD BY AUTOMATED COUNT: 13.8 % (ref 12.3–15.4)
FLUAV SUBTYP SPEC NAA+PROBE: NOT DETECTED
FLUBV RNA ISLT QL NAA+PROBE: NOT DETECTED
GAS FLOW AIRWAY: 6 LPM
GLOBULIN UR ELPH-MCNC: 2.8 GM/DL
GLUCOSE BLDC GLUCOMTR-MCNC: 123 MG/DL (ref 70–130)
GLUCOSE SERPL-MCNC: 105 MG/DL (ref 65–99)
HADV DNA SPEC NAA+PROBE: NOT DETECTED
HCO3 BLDA-SCNC: 24.1 MMOL/L (ref 22–28)
HCOV 229E RNA SPEC QL NAA+PROBE: NOT DETECTED
HCOV HKU1 RNA SPEC QL NAA+PROBE: NOT DETECTED
HCOV NL63 RNA SPEC QL NAA+PROBE: NOT DETECTED
HCOV OC43 RNA SPEC QL NAA+PROBE: NOT DETECTED
HCT VFR BLD AUTO: 47.3 % (ref 37.5–51)
HGB BLD-MCNC: 15.8 G/DL (ref 13–17.7)
HMPV RNA NPH QL NAA+NON-PROBE: NOT DETECTED
HPIV1 RNA ISLT QL NAA+PROBE: NOT DETECTED
HPIV2 RNA SPEC QL NAA+PROBE: NOT DETECTED
HPIV3 RNA NPH QL NAA+PROBE: NOT DETECTED
HPIV4 P GENE NPH QL NAA+PROBE: NOT DETECTED
IMM GRANULOCYTES # BLD AUTO: 0.04 10*3/MM3 (ref 0–0.05)
IMM GRANULOCYTES NFR BLD AUTO: 0.4 % (ref 0–0.5)
LYMPHOCYTES # BLD AUTO: 3.19 10*3/MM3 (ref 0.7–3.1)
LYMPHOCYTES NFR BLD AUTO: 28.7 % (ref 19.6–45.3)
M PNEUMO IGG SER IA-ACNC: NOT DETECTED
MCH RBC QN AUTO: 27.8 PG (ref 26.6–33)
MCHC RBC AUTO-ENTMCNC: 33.4 G/DL (ref 31.5–35.7)
MCV RBC AUTO: 83.3 FL (ref 79–97)
METHADONE UR QL SCN: NEGATIVE
MODALITY: ABNORMAL
MONOCYTES # BLD AUTO: 1.16 10*3/MM3 (ref 0.1–0.9)
MONOCYTES NFR BLD AUTO: 10.5 % (ref 5–12)
NEUTROPHILS NFR BLD AUTO: 5.75 10*3/MM3 (ref 1.7–7)
NEUTROPHILS NFR BLD AUTO: 51.8 % (ref 42.7–76)
NRBC BLD AUTO-RTO: 0 /100 WBC (ref 0–0.2)
NT-PROBNP SERPL-MCNC: 51.9 PG/ML (ref 0–450)
OPIATES UR QL: NEGATIVE
OXYCODONE UR QL SCN: NEGATIVE
PCO2 BLDA: 41 MM HG (ref 35–45)
PH BLDA: 7.38 PH UNITS (ref 7.35–7.45)
PLATELET # BLD AUTO: 277 10*3/MM3 (ref 140–450)
PMV BLD AUTO: 11.1 FL (ref 6–12)
PO2 BLDA: 77.2 MM HG (ref 80–100)
POTASSIUM SERPL-SCNC: 3.4 MMOL/L (ref 3.5–5.2)
PROCALCITONIN SERPL-MCNC: 0.04 NG/ML (ref 0–0.25)
PROT SERPL-MCNC: 8 G/DL (ref 6–8.5)
QT INTERVAL: 331 MS
RBC # BLD AUTO: 5.68 10*6/MM3 (ref 4.14–5.8)
RHINOVIRUS RNA SPEC NAA+PROBE: NOT DETECTED
RSV RNA NPH QL NAA+NON-PROBE: NOT DETECTED
SAO2 % BLDCOA: 95 % (ref 92–99)
SARS-COV-2 RNA NPH QL NAA+NON-PROBE: NOT DETECTED
SODIUM SERPL-SCNC: 143 MMOL/L (ref 136–145)
TOTAL RATE: 18 BREATHS/MINUTE
TROPONIN T SERPL-MCNC: <0.01 NG/ML (ref 0–0.03)
WBC NRBC COR # BLD: 11.1 10*3/MM3 (ref 3.4–10.8)

## 2022-10-14 PROCEDURE — 94799 UNLISTED PULMONARY SVC/PX: CPT

## 2022-10-14 PROCEDURE — 84145 PROCALCITONIN (PCT): CPT | Performed by: EMERGENCY MEDICINE

## 2022-10-14 PROCEDURE — 80307 DRUG TEST PRSMV CHEM ANLYZR: CPT | Performed by: INTERNAL MEDICINE

## 2022-10-14 PROCEDURE — 94640 AIRWAY INHALATION TREATMENT: CPT

## 2022-10-14 PROCEDURE — 25010000002 METHYLPREDNISOLONE PER 125 MG: Performed by: INTERNAL MEDICINE

## 2022-10-14 PROCEDURE — 93005 ELECTROCARDIOGRAM TRACING: CPT | Performed by: EMERGENCY MEDICINE

## 2022-10-14 PROCEDURE — 93010 ELECTROCARDIOGRAM REPORT: CPT | Performed by: INTERNAL MEDICINE

## 2022-10-14 PROCEDURE — 85025 COMPLETE CBC W/AUTO DIFF WBC: CPT | Performed by: EMERGENCY MEDICINE

## 2022-10-14 PROCEDURE — 0202U NFCT DS 22 TRGT SARS-COV-2: CPT | Performed by: EMERGENCY MEDICINE

## 2022-10-14 PROCEDURE — 25010000002 MAGNESIUM SULFATE 2 GM/50ML SOLUTION: Performed by: EMERGENCY MEDICINE

## 2022-10-14 PROCEDURE — 82962 GLUCOSE BLOOD TEST: CPT

## 2022-10-14 PROCEDURE — 94761 N-INVAS EAR/PLS OXIMETRY MLT: CPT

## 2022-10-14 PROCEDURE — 83605 ASSAY OF LACTIC ACID: CPT | Performed by: EMERGENCY MEDICINE

## 2022-10-14 PROCEDURE — 36600 WITHDRAWAL OF ARTERIAL BLOOD: CPT

## 2022-10-14 PROCEDURE — 84484 ASSAY OF TROPONIN QUANT: CPT | Performed by: EMERGENCY MEDICINE

## 2022-10-14 PROCEDURE — 82803 BLOOD GASES ANY COMBINATION: CPT

## 2022-10-14 PROCEDURE — 99285 EMERGENCY DEPT VISIT HI MDM: CPT

## 2022-10-14 PROCEDURE — 83880 ASSAY OF NATRIURETIC PEPTIDE: CPT | Performed by: EMERGENCY MEDICINE

## 2022-10-14 PROCEDURE — 94760 N-INVAS EAR/PLS OXIMETRY 1: CPT

## 2022-10-14 PROCEDURE — 80053 COMPREHEN METABOLIC PANEL: CPT | Performed by: EMERGENCY MEDICINE

## 2022-10-14 PROCEDURE — 25010000002 METHYLPREDNISOLONE PER 125 MG: Performed by: EMERGENCY MEDICINE

## 2022-10-14 PROCEDURE — 94664 DEMO&/EVAL PT USE INHALER: CPT

## 2022-10-14 PROCEDURE — 71045 X-RAY EXAM CHEST 1 VIEW: CPT

## 2022-10-14 RX ORDER — AMLODIPINE BESYLATE 10 MG/1
10 TABLET ORAL
Status: DISCONTINUED | OUTPATIENT
Start: 2022-10-14 | End: 2022-10-15 | Stop reason: HOSPADM

## 2022-10-14 RX ORDER — ACETAMINOPHEN 325 MG/1
650 TABLET ORAL EVERY 4 HOURS PRN
Status: DISCONTINUED | OUTPATIENT
Start: 2022-10-14 | End: 2022-10-15 | Stop reason: HOSPADM

## 2022-10-14 RX ORDER — METHYLPREDNISOLONE SODIUM SUCCINATE 125 MG/2ML
60 INJECTION, POWDER, LYOPHILIZED, FOR SOLUTION INTRAMUSCULAR; INTRAVENOUS EVERY 8 HOURS
Status: DISCONTINUED | OUTPATIENT
Start: 2022-10-14 | End: 2022-10-15 | Stop reason: HOSPADM

## 2022-10-14 RX ORDER — ONDANSETRON 2 MG/ML
4 INJECTION INTRAMUSCULAR; INTRAVENOUS EVERY 6 HOURS PRN
Status: DISCONTINUED | OUTPATIENT
Start: 2022-10-14 | End: 2022-10-15 | Stop reason: HOSPADM

## 2022-10-14 RX ORDER — ALBUTEROL SULFATE 2.5 MG/3ML
SOLUTION RESPIRATORY (INHALATION)
Status: DISPENSED
Start: 2022-10-14 | End: 2022-10-14

## 2022-10-14 RX ORDER — SODIUM CHLORIDE 9 MG/ML
125 INJECTION, SOLUTION INTRAVENOUS CONTINUOUS
Status: DISCONTINUED | OUTPATIENT
Start: 2022-10-14 | End: 2022-10-14

## 2022-10-14 RX ORDER — ALBUTEROL SULFATE 2.5 MG/3ML
10 SOLUTION RESPIRATORY (INHALATION)
Status: COMPLETED | OUTPATIENT
Start: 2022-10-14 | End: 2022-10-14

## 2022-10-14 RX ORDER — ENALAPRILAT 2.5 MG/2ML
0.62 INJECTION INTRAVENOUS EVERY 6 HOURS PRN
Status: DISCONTINUED | OUTPATIENT
Start: 2022-10-14 | End: 2022-10-15 | Stop reason: HOSPADM

## 2022-10-14 RX ORDER — SODIUM CHLORIDE 0.9 % (FLUSH) 0.9 %
10 SYRINGE (ML) INJECTION AS NEEDED
Status: DISCONTINUED | OUTPATIENT
Start: 2022-10-14 | End: 2022-10-15 | Stop reason: HOSPADM

## 2022-10-14 RX ORDER — MAGNESIUM SULFATE HEPTAHYDRATE 40 MG/ML
4 INJECTION, SOLUTION INTRAVENOUS AS NEEDED
Status: DISCONTINUED | OUTPATIENT
Start: 2022-10-14 | End: 2022-10-15 | Stop reason: HOSPADM

## 2022-10-14 RX ORDER — POTASSIUM CHLORIDE 750 MG/1
40 TABLET, FILM COATED, EXTENDED RELEASE ORAL AS NEEDED
Status: DISCONTINUED | OUTPATIENT
Start: 2022-10-14 | End: 2022-10-15 | Stop reason: HOSPADM

## 2022-10-14 RX ORDER — IPRATROPIUM BROMIDE AND ALBUTEROL SULFATE 2.5; .5 MG/3ML; MG/3ML
3 SOLUTION RESPIRATORY (INHALATION)
Status: DISCONTINUED | OUTPATIENT
Start: 2022-10-14 | End: 2022-10-15 | Stop reason: HOSPADM

## 2022-10-14 RX ORDER — PANTOPRAZOLE SODIUM 40 MG/10ML
40 INJECTION, POWDER, LYOPHILIZED, FOR SOLUTION INTRAVENOUS
Status: DISCONTINUED | OUTPATIENT
Start: 2022-10-14 | End: 2022-10-15 | Stop reason: HOSPADM

## 2022-10-14 RX ORDER — MAGNESIUM SULFATE HEPTAHYDRATE 40 MG/ML
2 INJECTION, SOLUTION INTRAVENOUS AS NEEDED
Status: DISCONTINUED | OUTPATIENT
Start: 2022-10-14 | End: 2022-10-15 | Stop reason: HOSPADM

## 2022-10-14 RX ORDER — POTASSIUM CHLORIDE 1.5 G/1.77G
40 POWDER, FOR SOLUTION ORAL AS NEEDED
Status: DISCONTINUED | OUTPATIENT
Start: 2022-10-14 | End: 2022-10-15 | Stop reason: HOSPADM

## 2022-10-14 RX ORDER — SODIUM CHLORIDE 0.9 % (FLUSH) 0.9 %
10 SYRINGE (ML) INJECTION EVERY 12 HOURS SCHEDULED
Status: DISCONTINUED | OUTPATIENT
Start: 2022-10-14 | End: 2022-10-15 | Stop reason: HOSPADM

## 2022-10-14 RX ORDER — ACETAMINOPHEN 650 MG/1
650 SUPPOSITORY RECTAL EVERY 4 HOURS PRN
Status: DISCONTINUED | OUTPATIENT
Start: 2022-10-14 | End: 2022-10-15 | Stop reason: HOSPADM

## 2022-10-14 RX ORDER — MAGNESIUM SULFATE HEPTAHYDRATE 40 MG/ML
2 INJECTION, SOLUTION INTRAVENOUS ONCE
Status: COMPLETED | OUTPATIENT
Start: 2022-10-14 | End: 2022-10-14

## 2022-10-14 RX ORDER — OXYCODONE AND ACETAMINOPHEN 7.5; 325 MG/1; MG/1
1 TABLET ORAL EVERY 8 HOURS PRN
Status: DISCONTINUED | OUTPATIENT
Start: 2022-10-14 | End: 2022-10-15 | Stop reason: HOSPADM

## 2022-10-14 RX ORDER — METHYLPREDNISOLONE SODIUM SUCCINATE 125 MG/2ML
125 INJECTION, POWDER, LYOPHILIZED, FOR SOLUTION INTRAMUSCULAR; INTRAVENOUS ONCE
Status: COMPLETED | OUTPATIENT
Start: 2022-10-14 | End: 2022-10-14

## 2022-10-14 RX ORDER — ENOXAPARIN SODIUM 100 MG/ML
40 INJECTION SUBCUTANEOUS EVERY 24 HOURS
Status: DISCONTINUED | OUTPATIENT
Start: 2022-10-14 | End: 2022-10-15 | Stop reason: HOSPADM

## 2022-10-14 RX ORDER — DEXTROSE AND SODIUM CHLORIDE 5; .45 G/100ML; G/100ML
100 INJECTION, SOLUTION INTRAVENOUS CONTINUOUS
Status: DISCONTINUED | OUTPATIENT
Start: 2022-10-14 | End: 2022-10-14

## 2022-10-14 RX ORDER — METHYLPREDNISOLONE SODIUM SUCCINATE 125 MG/2ML
60 INJECTION, POWDER, LYOPHILIZED, FOR SOLUTION INTRAMUSCULAR; INTRAVENOUS EVERY 12 HOURS
Status: DISCONTINUED | OUTPATIENT
Start: 2022-10-14 | End: 2022-10-14

## 2022-10-14 RX ADMIN — METHYLPREDNISOLONE SODIUM SUCCINATE 60 MG: 125 INJECTION, POWDER, FOR SOLUTION INTRAMUSCULAR; INTRAVENOUS at 14:20

## 2022-10-14 RX ADMIN — AMLODIPINE BESYLATE 10 MG: 10 TABLET ORAL at 05:49

## 2022-10-14 RX ADMIN — MAGNESIUM SULFATE HEPTAHYDRATE 2 G: 2 INJECTION, SOLUTION INTRAVENOUS at 01:38

## 2022-10-14 RX ADMIN — PANTOPRAZOLE SODIUM 40 MG: 40 INJECTION, POWDER, FOR SOLUTION INTRAVENOUS at 05:41

## 2022-10-14 RX ADMIN — Medication 10 ML: at 08:36

## 2022-10-14 RX ADMIN — SODIUM CHLORIDE 125 ML/HR: 9 INJECTION, SOLUTION INTRAVENOUS at 04:52

## 2022-10-14 RX ADMIN — ACETAMINOPHEN 650 MG: 325 TABLET, FILM COATED ORAL at 23:06

## 2022-10-14 RX ADMIN — Medication 10 ML: at 20:45

## 2022-10-14 RX ADMIN — SODIUM CHLORIDE 500 ML: 9 INJECTION, SOLUTION INTRAVENOUS at 01:42

## 2022-10-14 RX ADMIN — IPRATROPIUM BROMIDE AND ALBUTEROL SULFATE 3 ML: 2.5; .5 SOLUTION RESPIRATORY (INHALATION) at 15:32

## 2022-10-14 RX ADMIN — IPRATROPIUM BROMIDE AND ALBUTEROL SULFATE 3 ML: 2.5; .5 SOLUTION RESPIRATORY (INHALATION) at 20:27

## 2022-10-14 RX ADMIN — METHYLPREDNISOLONE SODIUM SUCCINATE 125 MG: 125 INJECTION, POWDER, FOR SOLUTION INTRAMUSCULAR; INTRAVENOUS at 01:31

## 2022-10-14 RX ADMIN — POTASSIUM CHLORIDE 40 MEQ: 750 TABLET, EXTENDED RELEASE ORAL at 23:36

## 2022-10-14 RX ADMIN — ALBUTEROL SULFATE 10 MG: 2.5 SOLUTION RESPIRATORY (INHALATION) at 02:28

## 2022-10-14 RX ADMIN — OXYCODONE HYDROCHLORIDE AND ACETAMINOPHEN 1 TABLET: 7.5; 325 TABLET ORAL at 23:36

## 2022-10-14 RX ADMIN — OXYCODONE HYDROCHLORIDE AND ACETAMINOPHEN 1 TABLET: 7.5; 325 TABLET ORAL at 11:39

## 2022-10-14 RX ADMIN — IPRATROPIUM BROMIDE AND ALBUTEROL SULFATE 3 ML: 2.5; .5 SOLUTION RESPIRATORY (INHALATION) at 10:50

## 2022-10-14 RX ADMIN — METHYLPREDNISOLONE SODIUM SUCCINATE 60 MG: 125 INJECTION, POWDER, FOR SOLUTION INTRAMUSCULAR; INTRAVENOUS at 20:45

## 2022-10-14 RX ADMIN — ALBUTEROL SULFATE 10 MG: 2.5 SOLUTION RESPIRATORY (INHALATION) at 01:33

## 2022-10-14 RX ADMIN — POTASSIUM CHLORIDE 40 MEQ: 750 TABLET, EXTENDED RELEASE ORAL at 11:39

## 2022-10-14 RX ADMIN — DEXTROSE AND SODIUM CHLORIDE 100 ML/HR: 5; 450 INJECTION, SOLUTION INTRAVENOUS at 05:29

## 2022-10-14 RX ADMIN — ACETAMINOPHEN 650 MG: 325 TABLET, FILM COATED ORAL at 05:41

## 2022-10-14 RX ADMIN — IPRATROPIUM BROMIDE AND ALBUTEROL SULFATE 3 ML: 2.5; .5 SOLUTION RESPIRATORY (INHALATION) at 07:03

## 2022-10-14 RX ADMIN — METHYLPREDNISOLONE SODIUM SUCCINATE 60 MG: 125 INJECTION, POWDER, FOR SOLUTION INTRAMUSCULAR; INTRAVENOUS at 05:41

## 2022-10-14 RX ADMIN — IPRATROPIUM BROMIDE AND ALBUTEROL SULFATE 3 ML: 2.5; .5 SOLUTION RESPIRATORY (INHALATION) at 23:09

## 2022-10-14 NOTE — ED NOTES
.Nursing report ED to floor  Craig Bolanos  36 y.o.  male    HPI :   Chief Complaint   Patient presents with    Shortness of Breath       Admitting doctor:   Yannick Ruiz Jr., MD    Admitting diagnosis:   The encounter diagnosis was Severe persistent asthma with exacerbation.    Code status:   Current Code Status       Date Active Code Status Order ID Comments User Context       Prior            Allergies:   Patient has no known allergies.    Isolation:   No active isolations    Intake and Output  No intake or output data in the 24 hours ending 10/14/22 0340    Weight:   There were no vitals filed for this visit.    Most recent vitals:   Vitals:    10/14/22 0133 10/14/22 0228 10/14/22 0333 10/14/22 0339   BP:  (!) 141/101  170/96   BP Location:    Left arm   Patient Position:    Lying   Pulse: 120 115 (!) 123    Resp: 24  18    Temp:       SpO2: 94% 95% 94%    Height:           Active LDAs/IV Access:   Lines, Drains & Airways       Active LDAs       Name Placement date Placement time Site Days    Peripheral IV 10/14/22 0131 Anterior;Right Forearm 10/14/22  0131  Forearm  less than 1                    Labs (abnormal labs have a star):   Labs Reviewed   COMPREHENSIVE METABOLIC PANEL - Abnormal; Notable for the following components:       Result Value    Glucose 105 (*)     Potassium 3.4 (*)     All other components within normal limits    Narrative:     GFR Normal >60  Chronic Kidney Disease <60  Kidney Failure <15     CBC WITH AUTO DIFFERENTIAL - Abnormal; Notable for the following components:    WBC 11.10 (*)     Eosinophil % 7.8 (*)     Lymphocytes, Absolute 3.19 (*)     Monocytes, Absolute 1.16 (*)     Eosinophils, Absolute 0.87 (*)     All other components within normal limits   BNP (IN-HOUSE) - Normal    Narrative:     Among patients with dyspnea, NT-proBNP is highly sensitive for the detection of acute congestive heart failure. In addition NT-proBNP of <300 pg/ml effectively rules out acute congestive  heart failure with 99% negative predictive value.    Results may be falsely decreased if patient taking Biotin.     TROPONIN (IN-HOUSE) - Normal    Narrative:     Troponin T Reference Range:  <= 0.03 ng/mL-   Negative for AMI  >0.03 ng/mL-     Abnormal for myocardial necrosis.  Clinicians would have to utilize clinical acumen, EKG, Troponin and serial changes to determine if it is an Acute Myocardial Infarction or myocardial injury due to an underlying chronic condition.       Results may be falsely decreased if patient taking Biotin.     RESPIRATORY PANEL PCR W/ COVID-19 (SARS-COV-2) PAMELA/ROSALINDA/JASON/PAD/COR/MAD/VALERIA IN-HOUSE, NP SWAB IN UTM/VTP, 3-4 HR TAT   LACTIC ACID, PLASMA   PROCALCITONIN   CBC AND DIFFERENTIAL    Narrative:     The following orders were created for panel order CBC & Differential.  Procedure                               Abnormality         Status                     ---------                               -----------         ------                     CBC Auto Differential[[id number]]        Abnormal            Final result                 Please view results for these tests on the individual orders.       EKG:   ECG 12 Lead   Preliminary Result   HEART RATE= 110  bpm   RR Interval= 545  ms   KS Interval= 149  ms   P Horizontal Axis= -28  deg   P Front Axis= 82  deg   QRSD Interval= 90  ms   QT Interval= 331  ms   QRS Axis= 73  deg   T Wave Axis= 9  deg   - ABNORMAL ECG -   Sinus tachycardia   Consider right atrial enlargement   Consider left ventricular hypertrophy   Electronically Signed By:    Date and Time of Study: 2022-10-14 03:16:35          Meds given in ED:   Medications   sodium chloride 0.9 % flush 10 mL (has no administration in time range)   sodium chloride 0.9 % infusion (has no administration in time range)   albuterol (PROVENTIL) (2.5 MG/3ML) 0.083% nebulizer solution  - ADS Override Pull (has no administration in time range)   albuterol (PROVENTIL) nebulizer solution 0.083% 2.5  mg/3mL (10 mg Nebulization Given 10/14/22 0133)   methylPREDNISolone sodium succinate (SOLU-Medrol) injection 125 mg (125 mg Intravenous Given 10/14/22 0131)   magnesium sulfate 2g/50 mL (PREMIX) infusion (2 g Intravenous New Bag 10/14/22 0138)   sodium chloride 0.9 % bolus 500 mL (500 mL Intravenous New Bag 10/14/22 0142)   albuterol (PROVENTIL) nebulizer solution 0.083% 2.5 mg/3mL (10 mg Nebulization Given 10/14/22 0228)       Imaging results:  XR Chest 1 View    Result Date: 10/14/2022  Electronically signed by Anu Johnson MD on 10-14-22 at 0246     Ambulatory status:   - stand by    Social issues:   Social History     Socioeconomic History    Marital status: Single   Tobacco Use    Smoking status: Former     Packs/day: 1.00     Years: 10.00     Pack years: 10.00     Types: Cigarettes     Quit date: 2013     Years since quittin.3    Smokeless tobacco: Never   Substance and Sexual Activity    Alcohol use: No    Drug use: Yes     Types: Marijuana    Sexual activity: Defer       NIH Stroke Scale:         Jackeline Barnett RN  10/14/22 03:40 EDT

## 2022-10-14 NOTE — ED TRIAGE NOTES
To ER via EMS from home. SOA x 1 hour.  Hx asthma.  Alb MN x 2. Unable to speak in full sentence. Wheezes bilaterally.     Triage staff in appropriate PPE.

## 2022-10-14 NOTE — H&P
Patient Care Team:  Provider, No Known as PCP - General      Subjective     Patient is a 36 y.o. male.  Asked to admit for status asthmaticus.  Patient in significant respiratory distress.  He can talk but he is sitting in the tripod position.  He has have him on 5 L nasal cannula O2 saturations are 95% but he is clearly working to breathe.  In addition to his asthma.  He has hypertension.  He is a former smoker he smoked for about 10 years but he quit back in .  He had some recent runny nose.  He has a history of some allergies.  No fevers or chills.  He has been little sweaty today and he has had a little bit of headache.  He has a history of severe asthma and sounds like he has been on the ventilator for this before multiple hospitalizations.  Patient is on Symbicort with as needed albuterol, ipratropium.  No chest pain no nausea no lower extremity pain or swelling      Review of Systems:    Minimized due to his respiratory distress    History  Past Medical History:   Diagnosis Date   • Asthma    • COPD (chronic obstructive pulmonary disease) (CMS/Former[facility])    • Hypertension    • Migraine      No past surgical history on file.  Social History     Socioeconomic History   • Marital status: Single   Tobacco Use   • Smoking status: Former     Packs/day: 1.00     Years: 10.00     Pack years: 10.00     Types: Cigarettes     Quit date: 2013     Years since quittin.3   • Smokeless tobacco: Never   Substance and Sexual Activity   • Alcohol use: No   • Drug use: Yes     Types: Marijuana   • Sexual activity: Defer     Family History   Problem Relation Age of Onset   • Diabetes Mother          Allergies:  Patient has no known allergies.    Medications:  Prior to Admission medications    Medication Sig Start Date End Date Taking? Authorizing Provider   azithromycin (ZITHROMAX Z-MINDY) 250 MG tablet Take 2 tablets the first day, then 1 tablet daily for 4 days. 18   Miguelangel Lowery MD    budesonide-formoterol (SYMBICORT) 160-4.5 MCG/ACT inhaler Inhale 2 puffs 2 (Two) Times a Day. 11/11/17   Pilar Lares APRN   cetirizine (zyrTEC) 10 MG tablet Take 10 mg by mouth Daily.    Marcin Leal MD   hydrochlorothiazide (MICROZIDE) 12.5 MG capsule Take 12.5 mg by mouth Daily.    Marcin Leal MD   ipratropium (ATROVENT HFA) 17 MCG/ACT inhaler Inhale 2 puffs 4 (Four) Times a Day. 12/15/17   Gabriel Kumar MD   ipratropium-albuterol (COMBIVENT RESPIMAT)  MCG/ACT inhaler Inhale 1 puff 4 (Four) Times a Day As Needed for Wheezing.    Marcin Leal MD   ipratropium-albuterol (COMBIVENT RESPIMAT)  MCG/ACT inhaler Inhale 1 puff 4 (Four) Times a Day As Needed for Wheezing. 11/11/17   Pilar Lares APRN   ipratropium-albuterol (DUO-NEB) 0.5-2.5 mg/mL nebulizer Take 3 mL by nebulization Every 4 (Four) Hours As Needed for Shortness of Air. 6/4/17   Yannick Ruiz Jr., MD   lisinopril (PRINIVIL,ZESTRIL) 5 MG tablet Take 5 mg by mouth Daily.    Marcin Leal MD   montelukast (SINGULAIR) 10 MG tablet Take 1 tablet by mouth Every Night. 11/11/17   Pilar Lares APRN   Montelukast Sodium (SINGULAIR PO) Take  by mouth Daily.    Marcin Leal MD   predniSONE (DELTASONE) 10 MG tablet Take 4 tabs daily x 3 days, then take 3 tabs daily x 3 days, then take 2 tabs daily x 3 days, then take 1 tab daily x 3 days 8/1/18   Miguelangel Lowery MD   predniSONE (DELTASONE) 50 MG tablet Take 1 tablet by mouth Daily. 11/7/20   Karthik Gay MD     albuterol, , ,       sodium chloride, 125 mL/hr        Objective     Vital Signs  Vital Sign Min/Max for last 24 hours  Temp  Min: 99.1 °F (37.3 °C)  Max: 99.1 °F (37.3 °C)   BP  Min: 160/83  Max: 160/83   Pulse  Min: 111  Max: 120   Resp  Min: 24  Max: 24   SpO2  Min: 94 %  Max: 95 %   Flow (L/min)  Min: 6  Max: 6   No data recorded     No intake or output data in the 24 hours ending 10/14/22 0232  No intake/output data  "recorded.  Last Weight and Admission Weight    There were no vitals filed for this visit.  Flowsheet Rows    Flowsheet Row First Filed Value   Admission Height 175.3 cm (69\") Documented at 10/14/2022 0122   Admission Weight --          Body mass index is 20.67 kg/m².           Physical Exam:  General Appearance: Well-developed thin black male he is sitting up in bed he sort of holding himself with both arms down.  He is breathing about 30 times a minute.  He is able to talk and say about 3-4 words before he gets dyspneic.  He is on 3 to 5 L nasal cannula O2 saturation between 91 and 95% on that.  We just did a blood gas on this is pH is 7.37, PCO2 is 41.  His PO2 was 77.  Eyes: Conjunctiva are clear and anicteric pupils are equal  ENT: Mucous membranes are little dry no erythema no exudates  Neck: Trachea midline no visible jugular venous distention, no crepitance  Lungs: He is pretty tight.  He is sort tripoding when he sits up.  He is using accessory muscles.  He has wheezes in the upper lung fields.  Not a whole lot of air movement to the bases  Cardiac: Tachycardic regular rhythm no murmur, heart rates in the 110s to 120s  Abdomen: He is using abdominal muscles on the breathing no palpate hepatosplenomegaly  : Not examined  Musculoskeletal: Grossly normal  Skin: Warm and dry.  He is not diaphoretic  Neuro: He is alert and oriented he is cooperative  Extremities/P Vascular: No clubbing no cyanosis no edema palpable radial and dorsalis pedis pulses  MSE: He is a little anxious think he is covering it very well.  Sort of the macho type      Labs:  Results from last 7 days   Lab Units 10/14/22  0128   GLUCOSE mg/dL 105*   SODIUM mmol/L 143   POTASSIUM mmol/L 3.4*   CO2 mmol/L 25.3   CHLORIDE mmol/L 103   ANION GAP mmol/L 14.7   CREATININE mg/dL 0.95   BUN mg/dL 7   BUN / CREAT RATIO  7.4   CALCIUM mg/dL 9.9   ALK PHOS U/L 60   TOTAL PROTEIN g/dL 8.0   ALT (SGPT) U/L 26   AST (SGOT) U/L 24   BILIRUBIN mg/dL 0.2 "   ALBUMIN g/dL 5.20   GLOBULIN gm/dL 2.8     CrCl cannot be calculated (Unknown ideal weight.).      Results from last 7 days   Lab Units 10/14/22  0128   WBC 10*3/mm3 11.10*   RBC 10*6/mm3 5.68   HEMOGLOBIN g/dL 15.8   HEMATOCRIT % 47.3   MCV fL 83.3   MCH pg 27.8   MCHC g/dL 33.4   RDW % 13.8   RDW-SD fl 41.7   MPV fL 11.1   PLATELETS 10*3/mm3 277   NEUTROPHIL % % 51.8   LYMPHOCYTE % % 28.7   MONOCYTES % % 10.5   EOSINOPHIL % % 7.8*   BASOPHIL % % 0.8   IMM GRAN % % 0.4   NEUTROS ABS 10*3/mm3 5.75   LYMPHS ABS 10*3/mm3 3.19*   MONOS ABS 10*3/mm3 1.16*   EOS ABS 10*3/mm3 0.87*   BASOS ABS 10*3/mm3 0.09   IMMATURE GRANS (ABS) 10*3/mm3 0.04   NRBC /100 WBC 0.0         Results from last 7 days   Lab Units 10/14/22  0128   TROPONIN T ng/mL <0.010     Results from last 7 days   Lab Units 10/14/22  0128   PROBNP pg/mL 51.9                 Microbiology Results (last 10 days)     ** No results found for the last 240 hours. **            Diagnostics:  XR Chest 2Vw    Result Date: 10/11/2022  REVIEWING YOUR TEST RESULTS IN Psychiatric IS NOT A SUBSTITUTE FOR DISCUSSING THOSE RESULTS WITH YOUR HEALTH CARE PROVIDER. PLEASE CONTACT YOUR PROVIDER VIA Psychiatric TO DISCUSS ANY QUESTIONS OR CONCERNS YOU MAY HAVE REGARDING THESE TEST RESULTS.  RADIOLOGY REPORT FACILITY:  [facility] UNIT/AGE/GENDER: M.RAD  OP      AGE:36 Y          SEX:M PATIENT NAME/:  ALEX LUI M    1985 UNIT NUMBER:  [id number] ACCOUNT NUMBER:  [id number] ACCESSION NUMBER:  [id number] EXAMINATION: XR CHEST 2VW DATE: 10/11/2022 HISTORY: Asthma. COMPARISON: 2020 FINDINGS: Two views of the chest demonstrate clear lungs. There is no pneumothorax or pleural effusion. The heart size and mediastinal contour are normal.     IMPRESSION: No acute cardiopulmonary radiographic abnormality. Dictated by: Ronnell Adkins M.D. Images and Report reviewed and interpreted by: Ronnell Adkins M.D. <PS><Electronically signed by:  Ronnell Adkins M.D.> 10/11/2022 1104 D: 10/11/2022 1104 T: 10/11/2022 1104    XR CHEST 1 VW PORTABLE    Result Date: 10/2/2022  INDICATION:  Shortness of breath. TECHNIQUE:  Frontal chest was obtained at 10:54 hours. COMPARISON:  9/13/2022 XR chest. FINDINGS:   The cardiomediastinal silhouette is normal in size.  Lungs are mildly hyperinflated without focal infiltrates, edema or effusion..  There are no pleural effusions. There is no pneumothorax.  No acute osseous process.   IMPRESSION: Mild hyperinflation without focal infiltrates, edema or effusion. Signed by Stacey Hewitt MD ##### Final ##### Dictated by:    STACEY HEWITT RAD-RAD Dictated DT/TM: 10/02/2022 11:45 am Interpreted and electronically signed by:  STACEY HEWITT RAD-RAD Signed DT/TM:  10/02/2022 11:50 am        Chest x-ray reviewed, there may be some hyperinflation but otherwise clear    Assessment & Plan     1. Status asthmaticus.  Patient is very concerning to me.  His PCO2 is normal.  He is working hard to breathe.  He is not able to blow down.  He has had steroids and a number of breathing treatments and has not turned around he had 2 g of magnesium in the emergency department.  We will continue systemic steroids and bronchodilators.  He does not have an identifiable infection.  His respiratory pathogen panel was negative.  Procalcitonin is normal.  I do not think antibiotics are indicated.  I have a low threshold to intubate the patient if he deteriorates any further.  He would obviously like to avoid this but there is very little room for further deterioration without catastrophe and I told him that.  2. Hypertension blood pressure is up quite a bit may be due to medications and his underlying essential hypertension compounded by respiratory stress.      Yannick Ruiz Jr, MD  10/14/22  02:32 EDT    Time: Critical care time 49 minutes

## 2022-10-14 NOTE — ED PROVIDER NOTES
EMERGENCY DEPARTMENT ENCOUNTER    Room Number:  I388/1  Date of encounter:  10/14/2022  PCP: Provider, No Known  Historian: Patient and EMS      HPI:  Chief Complaint: Dyspnea, wheezing    A complete HPI/ROS/PMH/PSH/SH/FH are unobtainable due to: Dyspnea    Context: Craig Bolanos is a 36 y.o. male who presents to the ED c/o sudden onset of wheezing and dyspnea which started tonight.  On EMS arrival, the fire department was giving him a albuterol nebulizer treatment and his saturations were around 90%.  In route, he suddenly became diaphoretic and short of breath and was given another albuterol breathing treatment.  On arrival emergency department, he is tripoding, is in moderate to severe respiratory distress and is diaphoretic.  History is limited due to his dyspnea.  He reports he has had a cough productive of clear sputum.  No fevers or chills.    Summary of prior records: No recent records at [facility].  He did see Dr. Cordero with [place] pulmonary specialist yesterday to establish care.  He has severe persistent asthma.    The patient was placed in a mask in triage, hand hygiene was performed before and after my interaction with the patient.  I wore a mask, safety glasses and gloves during my entire interaction with the patient.    PAST MEDICAL HISTORY  Active Ambulatory Problems     Diagnosis Date Noted   • Severe persistent asthma with status asthmaticus 06/01/2017   • COPD exacerbation (HCC) 09/17/2017   • Benign essential HTN 09/17/2017   • Hilar lymphadenopathy 09/19/2017     Resolved Ambulatory Problems     Diagnosis Date Noted   • Acute respiratory failure with hypoxia ([facility]) 09/17/2017     Past Medical History:   Diagnosis Date   • Asthma    • COPD (chronic obstructive pulmonary disease) (CMS/HCC)    • Hypertension    • Migraine          PAST SURGICAL HISTORY  No past surgical history on file.      FAMILY HISTORY  Family History   Problem Relation Age of Onset   • Diabetes Mother           SOCIAL HISTORY  Social History     Socioeconomic History   • Marital status: Single   Tobacco Use   • Smoking status: Former     Packs/day: 1.00     Years: 10.00     Pack years: 10.00     Types: Cigarettes     Quit date: 2013     Years since quittin.3   • Smokeless tobacco: Never   Substance and Sexual Activity   • Alcohol use: No   • Drug use: Yes     Types: Marijuana   • Sexual activity: Defer         ALLERGIES  Patient has no known allergies.        REVIEW OF SYSTEMS  Review of Systems   Unable to perform ROS: Acuity of condition   Constitutional: Negative for fever.   Respiratory: Positive for cough, shortness of breath and wheezing.              PHYSICAL EXAM    I have reviewed the triage vital signs and nursing notes.    ED Triage Vitals [10/14/22 0121]   Temp Heart Rate Resp BP SpO2   -- 111 24 -- 95 %      Temp src Heart Rate Source Patient Position BP Location FiO2 (%)   -- -- -- -- --       Physical Exam   Constitutional: Pt. is awake and alert.  He is in moderate/severe respiratory distress-he is sitting up in a tripod position with audible wheezing and is diaphoretic.  HENT: Normocephalic and atraumatic.   Neck: Normal range of motion. Neck supple. No JVD present.   Cardiovascular: Tachycardic rate, regular rhythm and normal heart sounds. No murmur heard.  Pulmonary/Chest: Markedly increased work of breathing with diffuse wheezing throughout.    Abdominal: Soft.  There is no tenderness. There is no rebound and no guarding.   Musculoskeletal: Normal range of motion. No edema, tenderness or deformity.   Neurological: Pt. is awake and alert.  He appears oriented.  He has no obvious neurologic deficits.  Skin: Diaphoretic.   Psychiatric: Anxious, but pleasant and cooperative.  Nursing note and vitals reviewed.        LAB RESULTS  Recent Results (from the past 24 hour(s))   Comprehensive Metabolic Panel    Collection Time: 10/14/22  1:28 AM    Specimen: Blood   Result Value Ref Range     Glucose 105 (H) 65 - 99 mg/dL    BUN 7 6 - 20 mg/dL    Creatinine 0.95 0.76 - 1.27 mg/dL    Sodium 143 136 - 145 mmol/L    Potassium 3.4 (L) 3.5 - 5.2 mmol/L    Chloride 103 98 - 107 mmol/L    CO2 25.3 22.0 - 29.0 mmol/L    Calcium 9.9 8.6 - 10.5 mg/dL    Total Protein 8.0 6.0 - 8.5 g/dL    Albumin 5.20 3.50 - 5.20 g/dL    ALT (SGPT) 26 1 - 41 U/L    AST (SGOT) 24 1 - 40 U/L    Alkaline Phosphatase 60 39 - 117 U/L    Total Bilirubin 0.2 0.0 - 1.2 mg/dL    Globulin 2.8 gm/dL    A/G Ratio 1.9 g/dL    BUN/Creatinine Ratio 7.4 7.0 - 25.0    Anion Gap 14.7 5.0 - 15.0 mmol/L    eGFR 107.0 >60.0 mL/min/1.73   BNP    Collection Time: 10/14/22  1:28 AM    Specimen: Blood   Result Value Ref Range    proBNP 51.9 0.0 - 450.0 pg/mL   Troponin    Collection Time: 10/14/22  1:28 AM    Specimen: Blood   Result Value Ref Range    Troponin T <0.010 0.000 - 0.030 ng/mL   CBC Auto Differential    Collection Time: 10/14/22  1:28 AM    Specimen: Blood   Result Value Ref Range    WBC 11.10 (H) 3.40 - 10.80 10*3/mm3    RBC 5.68 4.14 - 5.80 10*6/mm3    Hemoglobin 15.8 13.0 - 17.7 g/dL    Hematocrit 47.3 37.5 - 51.0 %    MCV 83.3 79.0 - 97.0 fL    MCH 27.8 26.6 - 33.0 pg    MCHC 33.4 31.5 - 35.7 g/dL    RDW 13.8 12.3 - 15.4 %    RDW-SD 41.7 37.0 - 54.0 fl    MPV 11.1 6.0 - 12.0 fL    Platelets 277 140 - 450 10*3/mm3    Neutrophil % 51.8 42.7 - 76.0 %    Lymphocyte % 28.7 19.6 - 45.3 %    Monocyte % 10.5 5.0 - 12.0 %    Eosinophil % 7.8 (H) 0.3 - 6.2 %    Basophil % 0.8 0.0 - 1.5 %    Immature Grans % 0.4 0.0 - 0.5 %    Neutrophils, Absolute 5.75 1.70 - 7.00 10*3/mm3    Lymphocytes, Absolute 3.19 (H) 0.70 - 3.10 10*3/mm3    Monocytes, Absolute 1.16 (H) 0.10 - 0.90 10*3/mm3    Eosinophils, Absolute 0.87 (H) 0.00 - 0.40 10*3/mm3    Basophils, Absolute 0.09 0.00 - 0.20 10*3/mm3    Immature Grans, Absolute 0.04 0.00 - 0.05 10*3/mm3    nRBC 0.0 0.0 - 0.2 /100 WBC   Procalcitonin    Collection Time: 10/14/22  1:28 AM    Specimen: Blood   Result  Value Ref Range    Procalcitonin 0.04 0.00 - 0.25 ng/mL   Lactic Acid, Plasma    Collection Time: 10/14/22  3:14 AM    Specimen: Arm, Right; Blood   Result Value Ref Range    Lactate 1.7 0.5 - 2.0 mmol/L   ECG 12 Lead    Collection Time: 10/14/22  3:16 AM   Result Value Ref Range    QT Interval 331 ms   Respiratory Panel PCR w/COVID-19(SARS-CoV-2) PAMELA/ROSALINDA/JASON/PAD/COR/MAD/VALERIA In-House, NP Swab in UT/VTM, 3-4 HR TAT - Swab, Nasopharynx    Collection Time: 10/14/22  3:20 AM    Specimen: Nasopharynx; Swab   Result Value Ref Range    ADENOVIRUS, PCR Not Detected Not Detected    Coronavirus 229E Not Detected Not Detected    Coronavirus HKU1 Not Detected Not Detected    Coronavirus NL63 Not Detected Not Detected    Coronavirus OC43 Not Detected Not Detected    COVID19 Not Detected Not Detected - Ref. Range    Human Metapneumovirus Not Detected Not Detected    Human Rhinovirus/Enterovirus Not Detected Not Detected    Influenza A PCR Not Detected Not Detected    Influenza B PCR Not Detected Not Detected    Parainfluenza Virus 1 Not Detected Not Detected    Parainfluenza Virus 2 Not Detected Not Detected    Parainfluenza Virus 3 Not Detected Not Detected    Parainfluenza Virus 4 Not Detected Not Detected    RSV, PCR Not Detected Not Detected    Bordetella pertussis pcr Not Detected Not Detected    Bordetella parapertussis PCR Not Detected Not Detected    Chlamydophila pneumoniae PCR Not Detected Not Detected    Mycoplasma pneumo by PCR Not Detected Not Detected   POC Glucose Once    Collection Time: 10/14/22  4:46 AM    Specimen: Blood   Result Value Ref Range    Glucose 123 70 - 130 mg/dL   Blood Gas, Arterial -    Collection Time: 10/14/22  5:00 AM    Specimen: Arterial Blood   Result Value Ref Range    Site Arterial: left brachial     Donnell's Test N/A     pH, Arterial 7.377 7.350 - 7.450 pH units    pCO2, Arterial 41.0 35.0 - 45.0 mm Hg    pO2, Arterial 77.2 (L) 80.0 - 100.0 mm Hg    HCO3, Arterial 24.1 22.0 - 28.0 mmol/L     Base Excess, Arterial -1.1 (L) 0.0 - 2.0 mmol/L    O2 Saturation Calculated 95.0 92.0 - 99.0 %    Barometric Pressure for Blood Gas 745.8 mmHg    Modality Cannula     Flow Rate 6 lpm    Rate 18 Breaths/minute       Ordered the above labs and independently reviewed the results.        RADIOLOGY  XR Chest 1 View    Result Date: 10/14/2022  Patient: ALEX LUI  Time Out: 02:46 Exam(s): FILM CXR 1 VIEW EXAM:   XR Chest, 1 View CLINICAL HISTORY:      Shortness of breath. TECHNIQUE:   Frontal view of the chest. COMPARISON:   Chest radiograph 11 7 2020 FINDINGS:   Lungs:  Unremarkable.  No consolidation.   Pleural space:  Unremarkable.  No pneumothorax.   Heart:  Unremarkable.  No cardiomegaly.   Mediastinum:  Unremarkable.   Bones joints:  Unremarkable. IMPRESSION:       Normal chest x-ray.     Electronically signed by Anu Johnson MD on 10-14-22 at 0246      I ordered the above noted radiological studies. Reviewed by me and discussed with radiologist.  See dictation for official radiology interpretation.      PROCEDURES    Procedures      MEDICATIONS GIVEN IN ER    Medications   sodium chloride 0.9 % flush 10 mL (has no administration in time range)   albuterol (PROVENTIL) (2.5 MG/3ML) 0.083% nebulizer solution  - ADS Override Pull (has no administration in time range)   ipratropium-albuterol (DUO-NEB) nebulizer solution 3 mL (has no administration in time range)   ipratropium-albuterol (DUO-NEB) nebulizer solution 3 mL (has no administration in time range)   methylPREDNISolone sodium succinate (SOLU-Medrol) injection 60 mg (has no administration in time range)   amLODIPine (NORVASC) tablet 10 mg (has no administration in time range)   sodium chloride 0.9 % flush 10 mL (has no administration in time range)   sodium chloride 0.9 % flush 10 mL (has no administration in time range)   Pharmacy to Dose enoxaparin (LOVENOX) (has no administration in time range)   dextrose 5 % and sodium chloride 0.45 % infusion (100  mL/hr Intravenous New Bag 10/14/22 0529)   acetaminophen (TYLENOL) tablet 650 mg (has no administration in time range)     Or   acetaminophen (TYLENOL) suppository 650 mg (has no administration in time range)   enalaprilat (VASOTEC) injection 0.625 mg (has no administration in time range)   ondansetron (ZOFRAN) injection 4 mg (has no administration in time range)   pantoprazole (PROTONIX) injection 40 mg (has no administration in time range)   Enoxaparin Sodium (LOVENOX) syringe 40 mg (has no administration in time range)   influenza vac split quad (FLUZONE,FLUARIX,AFLURIA,FLULAVAL) injection 0.5 mL (has no administration in time range)   albuterol (PROVENTIL) nebulizer solution 0.083% 2.5 mg/3mL (10 mg Nebulization Given 10/14/22 0133)   methylPREDNISolone sodium succinate (SOLU-Medrol) injection 125 mg (125 mg Intravenous Given 10/14/22 0131)   magnesium sulfate 2g/50 mL (PREMIX) infusion (0 g Intravenous Stopped 10/14/22 0343)   sodium chloride 0.9 % bolus 500 mL (500 mL Intravenous New Bag 10/14/22 0142)   albuterol (PROVENTIL) nebulizer solution 0.083% 2.5 mg/3mL (10 mg Nebulization Given 10/14/22 0228)   EPINEPHrine (Anaphylaxis) (ADRENALIN) 1 MG/ML injection  - ADS Override Pull (  Override Pull-Not given 10/14/22 0510)         PROGRESS, DATA ANALYSIS, CONSULTS, AND MEDICAL DECISION MAKING    Any/all labs have been independently reviewed by me.  Any/all radiology studies have been reviewed by me and discussed with radiologist dictating the report.   EKG's independently viewed and interpreted by me.  Discussion below represents my analysis of pertinent findings related to patient's condition, differential diagnosis, treatment plan and final disposition.    Number of Diagnoses or Management Options     Amount and/or Complexity of Data Reviewed  Clinical lab tests: Yes  Tests in the radiology section of CPT®: Yes  Tests in the medicine section of CPT®: Yes  Review and summarize past medical records:  (Yes - see  HPI)  Independent visualization of images, tracings, or specimens: (Yes - see below)      ED Course as of 10/14/22 0540   Fri Oct 14, 2022   0141 Reevaluation-patient receiving a continuous albuterol neb.  He is wheezing is only slightly improved.  He remains awake and alert. [WC]   0206 proBNP: 51.9 [WC]   0219 Troponin T: <0.010 [WC]   0231 Case discussed with Dr. Ruiz (pulmonary & critical care medicine)-he agrees to admit the patient to the ICU. [WC]   0233 30 minutes of critical care provided. This time excludes other billable procedures. Time does include preparation of documents, medical consultations, review of old records, and direct bedside care. Patient is at high risk for life-threatening deterioration due to acute severe asthma exacerbation.    [WC]   0318 Chest x-ray was independently visualized by me and discussed with/interpreted by the night shift radiology service (radiology).  There are no acute processes identified.  For official interpretation, see dictated report. [WC]   0319 EKG performed at 3:16 AM and interpreted by me shows sinus tachycardia with a rate of 110 bpm.  VA intervals, QS complexes and ST-T segments are unremarkable.  There is no significant change compared to 12/15/2017. [WC]      ED Course User Index  [WC] Damir Pelaez MD       AS OF 05:40 EDT VITALS:    BP - (!) 159/103  HR - 107  TEMP - 98.2 °F (36.8 °C) (Axillary)  02 SATS - 96%        DIAGNOSIS  Final diagnoses:   Severe persistent asthma with status asthmaticus         DISPOSITION  Admitted - ICU          Note Disclaimer: At [facility], we believe that sharing information builds trust and better relationships. You are receiving this note because you recently visited [facility]. It is possible you will see health information before a provider has talked with you about it. This kind of information can be easy to misunderstand. To help you fully understand what it means for your health, we urge you to discuss  this note with your provider.\         Damir Pelaez MD  10/14/22 0233       Damir Pelaez MD  10/14/22 0408       Damir Pelaez MD  10/14/22 0589

## 2022-10-14 NOTE — PROGRESS NOTES
Discharge Planning Assessment  [facility]     Patient Name: Craig Bolanos  MRN: [id number]  Today's Date: 10/14/2022    Admit Date: 10/14/2022    Plan: Home with no needs  He says he has new nebulizer ordered waiting for insurance to approve.   Discharge Needs Assessment     Row Name 10/14/22 1135       Living Environment    People in Home sibling(s)    Current Living Arrangements home    Potentially Unsafe Housing Conditions unable to assess    Primary Care Provided by self    Provides Primary Care For no one    Family Caregiver if Needed sibling(s)    Quality of Family Relationships unable to assess    Able to Return to Prior Arrangements yes       Resource/Environmental Concerns    Resource/Environmental Concerns none    Transportation Concerns none       Transition Planning    Patient/Family Anticipates Transition to home with family    Patient/Family Anticipated Services at Transition none    Transportation Anticipated family or friend will provide       Discharge Needs Assessment    Equipment Currently Used at Home nebulizer    Concerns to be Addressed discharge planning    Anticipated Changes Related to Illness none    Equipment Needed After Discharge none               Discharge Plan     Row Name 10/14/22 1137       Plan    Plan Home with no needs  He says he has new nebulizer ordered waiting for insurance to approve.    Plan Comments CCP spoke to patient at bedside.  CCP role explained.  Discharge planning discussed.  Pt emergency contact is his mother Ann, [phone].   Pt obtains his medications from [facility] on [address].  His PCP verified as Dr. Paul.  Pt lives in a one-story apartment with his sister Elio.  Pt is independent with ADL’s.  He uses no DME to ambulate.    He has no past history of rehab or Home Health.  Plan is Home with no needs.  He has ordered a nebulizer.   CCP following              Continued Care and Services - Admitted Since 10/14/2022    Coordination  has not been started for this encounter.          Demographic Summary    No documentation.                Functional Status    No documentation.                Psychosocial    No documentation.                Abuse/Neglect    No documentation.                Legal    No documentation.                Substance Abuse    No documentation.                Patient Forms    No documentation.                   Jacquelin Lema RN

## 2022-10-14 NOTE — PLAN OF CARE
Goal Outcome Evaluation:              Outcome Evaluation: vss, pain controlled with prn, advanced to clear liquid diet, weaned down to RA, respiratory status improved, will continue to monitor

## 2022-10-14 NOTE — PROGRESS NOTES
[facility] INPATIENT PROGRESS NOTE         [facility] INTENSIVE CARE    10/14/2022      PATIENT IDENTIFICATION:  Name: Craig Bolanos ADMIT: 10/14/2022   : 1985  PCP: Provider, No Known    MRN: [id number] LOS: 0 days   AGE/SEX: 36 y.o. male  ROOM: [facility]                     LOS 0    Reason for visit: Respiratory failure with severe status asthmaticus      SUBJECTIVE:      Follow-up severe asthma with status asthmaticus.  Has finally made a turn for the better.  Work of breathing is still increased but overall improved compared to previous.  Monitoring very closely.  I am seeing the patient for the first time today.  All patient problems are new to me.      Objective   OBJECTIVE:    Vital Sign Min/Max for last 24 hours  Temp  Min: 97.3 °F (36.3 °C)  Max: 99.1 °F (37.3 °C)   BP  Min: 132/96  Max: 170/96   Pulse  Min: 94  Max: 126   Resp  Min: 12  Max: 34   SpO2  Min: 91 %  Max: 96 %   No data recorded   Weight  Min: 63.6 kg (140 lb 3.4 oz)  Max: 63.6 kg (140 lb 3.4 oz)                         Body mass index is 20.71 kg/m².    Intake/Output Summary (Last 24 hours) at 10/14/2022 0937  Last data filed at 10/14/2022 0700  Gross per 24 hour   Intake 305.84 ml   Output 150 ml   Net 155.84 ml         Exam:  GEN:  No acute distress, appears stated age  EYES:   PERRL, anicteric sclerae  ENT:    External ears/nose normal, OP clear  NECK:  No adenopathy, midline trachea  LUNGS: Mildly labored.  Normal chest on inspection, palpation and moderate wheezing bilaterally auscultation  CV:  Normal S1S2, without murmur  ABD:  Nontender, nondistended, no hepatosplenomegaly, +BS  EXT:  No edema.  No cyanosis or clubbing.  No mottling and normal cap refill.    Assessment     Scheduled meds:  albuterol, , ,   amLODIPine, 10 mg, Oral, Q24H  enoxaparin, 40 mg, Subcutaneous, Q24H  ipratropium-albuterol, 3 mL, Nebulization, Q4H - RT  methylPREDNISolone sodium succinate, 60 mg, Intravenous, Q12H  pantoprazole, 40 mg,  Intravenous, Q AM  sodium chloride, 10 mL, Intravenous, Q12H      IV meds:                      dextrose 5 % and sodium chloride 0.45 %, 100 mL/hr, Last Rate: 100 mL/hr (10/14/22 0529)      Data Review:  Results from last 7 days   Lab Units 10/14/22  0128   SODIUM mmol/L 143   POTASSIUM mmol/L 3.4*   CHLORIDE mmol/L 103   CO2 mmol/L 25.3   BUN mg/dL 7   CREATININE mg/dL 0.95   GLUCOSE mg/dL 105*   CALCIUM mg/dL 9.9         Estimated Creatinine Clearance: 97.6 mL/min (by C-G formula based on SCr of 0.95 mg/dL).  Results from last 7 days   Lab Units 10/14/22  0128   WBC 10*3/mm3 11.10*   HEMOGLOBIN g/dL 15.8   PLATELETS 10*3/mm3 277         Results from last 7 days   Lab Units 10/14/22  0128   ALT (SGPT) U/L 26   AST (SGOT) U/L 24     Results from last 7 days   Lab Units 10/14/22  0500   PH, ARTERIAL pH units 7.377   PO2 ART mm Hg 77.2*   PCO2, ARTERIAL mm Hg 41.0   HCO3 ART mmol/L 24.1     Results from last 7 days   Lab Units 10/14/22  0314 10/14/22  0128   PROCALCITONIN ng/mL  --  0.04   LACTATE mmol/L 1.7  --          Glucose   Date/Time Value Ref Range Status   10/14/2022 0446 123 70 - 130 mg/dL Final     Comment:     Meter: [id number] : [id number] Korina Owens RN     Chest x-ray 10/14 reviewed      Microbiology reviewed              Active Hospital Problems    Diagnosis  POA   • **Severe persistent asthma with exacerbation [J45.51]  Yes      Resolved Hospital Problems   No resolved problems to display.         ASSESSMENT:    Status asthmaticus  Hypertension  Elevated eosinophils  Sinus tachycardia        PLAN:    Continue scheduled bronchodilators every 4 hours and as needed.  Continue IV steroid.  Increase frequency.  Certainly tenuous but seems to be going in the right direction as of now.  If increased work of breathing would add NIPPV.  If worsening bronchospasm may consider terbutaline.  Given elevated eosinophil count he may be a candidate for biologic injectable which can be determined as  outpatient.  Control glucose.  Control blood pressure.  DVT prophylaxis.    Discussed with multidisciplinary ICU team on rounds this morning.          CCT: 44 min    Cristian Andrea MD  Pulmonary and Critical Care Medicine  [facility], [facility]  10/14/2022    09:37 EDT

## 2022-10-14 NOTE — PROGRESS NOTES
"[facility] Clinical Pharmacy Services: Enoxaparin Consult    Craig Bolanos has a pharmacy consult to dose enoxaparin per Dr Ruiz's request.     Indication: Prophylaxis  Home Anticoagulation:       Relevant clinical data and objective history reviewed:  36 y.o. male 175.3 cm (69\")     Body mass index is 20.67 kg/m².  CrCl cannot be calculated (Unknown ideal weight.).    Assessment/Plan    Will start patient on 40mg subcutaneous every 24 hours, adjusted for renal function. Consult order will be discontinued but pharmacy will continue to follow.     Glenn Dorado, [facility]  Clinical Pharmacist      "
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
## 2022-10-15 VITALS
OXYGEN SATURATION: 94 % | DIASTOLIC BLOOD PRESSURE: 96 MMHG | SYSTOLIC BLOOD PRESSURE: 124 MMHG | WEIGHT: 138.89 LBS | HEART RATE: 108 BPM | HEIGHT: 69 IN | BODY MASS INDEX: 20.57 KG/M2 | RESPIRATION RATE: 18 BRPM | TEMPERATURE: 98.5 F

## 2022-10-15 LAB
ANION GAP SERPL CALCULATED.3IONS-SCNC: 6.6 MMOL/L (ref 5–15)
BUN SERPL-MCNC: 11 MG/DL (ref 6–20)
BUN/CREAT SERPL: 12.8 (ref 7–25)
CALCIUM SPEC-SCNC: 9.5 MG/DL (ref 8.6–10.5)
CHLORIDE SERPL-SCNC: 103 MMOL/L (ref 98–107)
CO2 SERPL-SCNC: 23.4 MMOL/L (ref 22–29)
CREAT SERPL-MCNC: 0.86 MG/DL (ref 0.76–1.27)
DEPRECATED RDW RBC AUTO: 43.6 FL (ref 37–54)
EGFRCR SERPLBLD CKD-EPI 2021: 115.8 ML/MIN/1.73
ERYTHROCYTE [DISTWIDTH] IN BLOOD BY AUTOMATED COUNT: 14.2 % (ref 12.3–15.4)
GLUCOSE SERPL-MCNC: 125 MG/DL (ref 65–99)
HCT VFR BLD AUTO: 44.2 % (ref 37.5–51)
HGB BLD-MCNC: 14.4 G/DL (ref 13–17.7)
MCH RBC QN AUTO: 27.8 PG (ref 26.6–33)
MCHC RBC AUTO-ENTMCNC: 32.6 G/DL (ref 31.5–35.7)
MCV RBC AUTO: 85.3 FL (ref 79–97)
PLATELET # BLD AUTO: 280 10*3/MM3 (ref 140–450)
PMV BLD AUTO: 11.1 FL (ref 6–12)
POTASSIUM SERPL-SCNC: 5.1 MMOL/L (ref 3.5–5.2)
RBC # BLD AUTO: 5.18 10*6/MM3 (ref 4.14–5.8)
SODIUM SERPL-SCNC: 133 MMOL/L (ref 136–145)
WBC NRBC COR # BLD: 23.91 10*3/MM3 (ref 3.4–10.8)

## 2022-10-15 PROCEDURE — 94799 UNLISTED PULMONARY SVC/PX: CPT

## 2022-10-15 PROCEDURE — 25010000002 ENOXAPARIN PER 10 MG: Performed by: INTERNAL MEDICINE

## 2022-10-15 PROCEDURE — 94761 N-INVAS EAR/PLS OXIMETRY MLT: CPT

## 2022-10-15 PROCEDURE — 85027 COMPLETE CBC AUTOMATED: CPT | Performed by: INTERNAL MEDICINE

## 2022-10-15 PROCEDURE — 80048 BASIC METABOLIC PNL TOTAL CA: CPT | Performed by: INTERNAL MEDICINE

## 2022-10-15 PROCEDURE — 94664 DEMO&/EVAL PT USE INHALER: CPT

## 2022-10-15 PROCEDURE — 25010000002 METHYLPREDNISOLONE PER 125 MG: Performed by: INTERNAL MEDICINE

## 2022-10-15 PROCEDURE — 94760 N-INVAS EAR/PLS OXIMETRY 1: CPT

## 2022-10-15 RX ORDER — PREDNISONE 20 MG/1
40 TABLET ORAL DAILY
Qty: 12 TABLET | Refills: 0 | Status: SHIPPED | OUTPATIENT
Start: 2022-10-15 | End: 2022-11-13

## 2022-10-15 RX ADMIN — PANTOPRAZOLE SODIUM 40 MG: 40 INJECTION, POWDER, FOR SOLUTION INTRAVENOUS at 06:12

## 2022-10-15 RX ADMIN — METHYLPREDNISOLONE SODIUM SUCCINATE 60 MG: 125 INJECTION, POWDER, FOR SOLUTION INTRAMUSCULAR; INTRAVENOUS at 14:40

## 2022-10-15 RX ADMIN — IPRATROPIUM BROMIDE AND ALBUTEROL SULFATE 3 ML: 2.5; .5 SOLUTION RESPIRATORY (INHALATION) at 16:11

## 2022-10-15 RX ADMIN — ENOXAPARIN SODIUM 40 MG: 100 INJECTION SUBCUTANEOUS at 09:01

## 2022-10-15 RX ADMIN — Medication 10 ML: at 09:02

## 2022-10-15 RX ADMIN — IPRATROPIUM BROMIDE AND ALBUTEROL SULFATE 3 ML: 2.5; .5 SOLUTION RESPIRATORY (INHALATION) at 04:09

## 2022-10-15 RX ADMIN — METHYLPREDNISOLONE SODIUM SUCCINATE 60 MG: 125 INJECTION, POWDER, FOR SOLUTION INTRAMUSCULAR; INTRAVENOUS at 06:12

## 2022-10-15 RX ADMIN — AMLODIPINE BESYLATE 10 MG: 10 TABLET ORAL at 09:01

## 2022-10-15 RX ADMIN — IPRATROPIUM BROMIDE AND ALBUTEROL SULFATE 3 ML: 2.5; .5 SOLUTION RESPIRATORY (INHALATION) at 11:48

## 2022-10-15 NOTE — DISCHARGE SUMMARY
DISCHARGE SUMMARY    Patient Name: Craig Bolanos  Age/Sex: 36 y.o. male  : 1985  MRN: [id number]  Patient Care Team:  Provider, No Known as PCP - General       Date of Admit: 10/14/2022  Date of Discharge:  10/15/22  Discharge Condition: Good    Discharge Diagnoses:  1. Status asthmaticus, resolved  2. Elevated eosinophils  3. Sinus tachycardia  4. HTN    History of present illness from H&P from 10/14/2022:   Patient is a 36 y.o. male.  Asked to admit for status asthmaticus.  Patient in significant respiratory distress.  He can talk but he is sitting in the tripod position.  He has have him on 5 L nasal cannula O2 saturations are 95% but he is clearly working to breathe.  In addition to his asthma.  He has hypertension.  He is a former smoker he smoked for about 10 years but he quit back in .  He had some recent runny nose.  He has a history of some allergies.  No fevers or chills.  He has been little sweaty today and he has had a little bit of headache.  He has a history of severe asthma and sounds like he has been on the ventilator for this before multiple hospitalizations.  Patient is on Symbicort with as needed albuterol, ipratropium.  No chest pain no nausea no lower extremity pain or swelling    Hospital Course:   I was involved in the patient's care and the last day of his stay.  To summarize the records to the best of my knowledge.    Patient was admitted on 10/14 early morning.  He was in status asthmaticus.  He received intravenous steroid and DuoNeb.  He required oxygen by nasal cannula but was weaned off on discharge.  He was noted to have significant eosinophilia.  Apparently he was just seen by his new pulmonologist Dr. Cordero.  at [place] not too long ago and was prescribed Dulera but he did not have the chance to pick it up from his pharmacy and he became sick and was having significant shortness of breath and cough and was using his rescue inhaler often so he decided to come to the  hospital.  Most of his symptoms improved throughout his hospitalization.  There was no evidence of clear trigger for his asthma.  His viral panel was negative.    Today, he was eager to go home.  He was having mild wheezing on exam only but did not appear to be in any kind of distress.  He said that he is going to  his Dulera and he already has albuterol inhaler and Combivent.  I will go ahead and discharge him on prednisone 40 mg daily for 6 more days.  I emphasized about the importance of using his maintenance inhaler.  He was previously on Symbicort but could not afford it and he stated the Dulera did not cost him anything but he just has to pick it up from the pharmacy and its ready for him to .    I encouraged him to get an appointment soon with Dr. Cordero.     Consults:   IP CONSULT TO PULMONOLOGY    Significant Discharge Diagnostics   Procedures Performed:         Pertinent Lab Results:  Results from last 7 days   Lab Units 10/15/22  0354 10/14/22  0128   SODIUM mmol/L 133* 143   POTASSIUM mmol/L 5.1 3.4*   CHLORIDE mmol/L 103 103   CO2 mmol/L 23.4 25.3   BUN mg/dL 11 7   CREATININE mg/dL 0.86 0.95   GLUCOSE mg/dL 125* 105*   CALCIUM mg/dL 9.5 9.9   AST (SGOT) U/L  --  24   ALT (SGPT) U/L  --  26     Results from last 7 days   Lab Units 10/14/22  0128   TROPONIN T ng/mL <0.010     Results from last 7 days   Lab Units 10/15/22  0354 10/14/22  0128   WBC 10*3/mm3 23.91* 11.10*   HEMOGLOBIN g/dL 14.4 15.8   HEMATOCRIT % 44.2 47.3   PLATELETS 10*3/mm3 280 277   MCV fL 85.3 83.3   MCH pg 27.8 27.8   MCHC g/dL 32.6 33.4   RDW % 14.2 13.8   RDW-SD fl 43.6 41.7   MPV fL 11.1 11.1   NEUTROPHIL % %  --  51.8   LYMPHOCYTE % %  --  28.7   MONOCYTES % %  --  10.5   EOSINOPHIL % %  --  7.8*   BASOPHIL % %  --  0.8   IMM GRAN % %  --  0.4   NEUTROS ABS 10*3/mm3  --  5.75   LYMPHS ABS 10*3/mm3  --  3.19*   MONOS ABS 10*3/mm3  --  1.16*   EOS ABS 10*3/mm3  --  0.87*   BASOS ABS 10*3/mm3  --  0.09    IMMATURE GRANS (ABS) 10*3/mm3  --  0.04   NRBC /100 WBC  --  0.0                 Results from last 7 days   Lab Units 10/14/22  0128   PROBNP pg/mL 51.9         Results from last 7 days   Lab Units 10/14/22  0500   PH, ARTERIAL pH units 7.377   PCO2, ARTERIAL mm Hg 41.0   PO2 ART mm Hg 77.2*   HCO3 ART mmol/L 24.1     Results from last 7 days   Lab Units 10/14/22  0314 10/14/22  0128   PROCALCITONIN ng/mL  --  0.04   LACTATE mmol/L 1.7  --                      Results from last 7 days   Lab Units 10/14/22  0320   ADENOVIRUS DETECTION BY PCR  Not Detected   CORONAVIRUS 229E  Not Detected   CORONAVIRUS HKU1  Not Detected   CORONAVIRUS NL63  Not Detected   CORONAVIRUS OC43  Not Detected   HUMAN METAPNEUMOVIRUS  Not Detected   HUMAN RHINOVIRUS/ENTEROVIRUS  Not Detected   INFLUENZA B PCR  Not Detected   PARAINFLUENZA 1  Not Detected   PARAINFLUENZA VIRUS 2  Not Detected   PARAINFLUENZA VIRUS 3  Not Detected   PARAINFLUENZA VIRUS 4  Not Detected   BORDETELLA PERTUSSIS PCR  Not Detected   BORDETELLA PARAPERTUSSIS PCR  Not Detected   CHLAMYDOPHILA PNEUMONIAE PCR  Not Detected   MYCOPLAMA PNEUMO PCR  Not Detected   RSV, PCR  Not Detected           Imaging Results:  Imaging Results (All)     Procedure Component Value Units Date/Time    XR Chest 1 View [[id number]] Collected: 10/14/22 0247     Updated: 10/14/22 0247    Narrative:        Patient: ALEX LUI  Time Out: 02:46  Exam(s): FILM CXR 1 VIEW     EXAM:    XR Chest, 1 View    CLINICAL HISTORY:       Shortness of breath.    TECHNIQUE:    Frontal view of the chest.    COMPARISON:    Chest radiograph 11 7 2020    FINDINGS:    Lungs:  Unremarkable.  No consolidation.    Pleural space:  Unremarkable.  No pneumothorax.    Heart:  Unremarkable.  No cardiomegaly.    Mediastinum:  Unremarkable.    Bones joints:  Unremarkable.    IMPRESSION:         Normal chest x-ray.      Impression:          Electronically signed by Anu Johnson MD on 10-14-22 at 0246          Objective:    Temp:  [97.4 °F (36.3 °C)-98.5 °F (36.9 °C)] 98.5 °F (36.9 °C)  Heart Rate:  [] 105  Resp:  [18-26] 18  BP: ()/(57-93) 114/68   SpO2:  [89 %-98 %] 93 %  on  Flow (L/min):  [1] 1 Device (Oxygen Therapy): room air    Intake/Output Summary (Last 24 hours) at 10/15/2022 1632  Last data filed at 10/15/2022 1400  Gross per 24 hour   Intake 240 ml   Output 1500 ml   Net -1260 ml     Body mass index is 20.51 kg/m².      10/14/22  0527 10/14/22  0600 10/15/22  0600   Weight: 63.6 kg (140 lb 3.4 oz) 63.6 kg (140 lb 3.4 oz) 63 kg (138 lb 14.2 oz)     Weight change: -0.6 kg (-1 lb 5.2 oz)    Physical Exam:      General: Alert, cooperative, in no acute distress.         HEENT:  No oral lesions. No thrush. Oral mucosa moist.   Chest Wall:  No abnormalities observed.             Neck:  Trachea midline. No JVD.   Pulmonary:  Equal but diminished air entry bilaterally.  Mild expiratory wheezing.  No crackles.  No use of accessory muscles          Cardio:  Regular rhythm and normal rate. Normal S1 and S2. No murmur, gallop, rub or click.    Abdominal:  Soft, non-tender and non-distended. Normal bowel sounds. No masses. No organomegaly. No guarding. No rebound tenderness.  Extremities:  Moves all extremities well. No cyanosis. No redness. No edema.     Discharge Medications and Instructions:     Discharge Medications     Discharge Medications      New Medications      Instructions Start Date   predniSONE 20 MG tablet  Commonly known as: DELTASONE   40 mg, Oral, Daily         Continue These Medications      Instructions Start Date   budesonide-formoterol 160-4.5 MCG/ACT inhaler  Commonly known as: SYMBICORT   2 puffs, Inhalation, 2 Times Daily - RT      cetirizine 10 MG tablet  Commonly known as: zyrTEC   10 mg, Oral, Daily      hydroCHLOROthiazide 12.5 MG capsule  Commonly known as: MICROZIDE   12.5 mg, Oral, Daily      ipratropium 17 MCG/ACT inhaler  Commonly known as: Atrovent HFA   2 puffs, Inhalation, 4 Times Daily -  RT      ipratropium-albuterol 0.5-2.5 mg/3 ml nebulizer  Commonly known as: DUO-NEB   3 mL, Nebulization, Every 4 Hours PRN      ipratropium-albuterol  MCG/ACT inhaler  Commonly known as: COMBIVENT RESPIMAT   1 puff, Inhalation, 4 Times Daily PRN      lisinopril 5 MG tablet  Commonly known as: PRINIVIL,ZESTRIL   5 mg, Oral, Daily      montelukast 10 MG tablet  Commonly known as: Singulair   10 mg, Oral, Nightly             Discharge Diet:    Dietary Orders (From admission, onward)     Start     Ordered    10/15/22 1609  Diet Regular  Diet Effective Now        Question:  Diet Texture / Consistency  Answer:  Regular    10/15/22 1609                Activity at Discharge:   As tolerated    Discharge disposition: Home    Discharge Instructions and Follow ups:     Follow-up Information     Provider, No Known .    Contact information:  [facility] [address] [phone]                       No future appointments.     Medication Reconciliation: Please see electronically completed Med Rec.    Total time spent discharging patient including evaluation, medication reconciliation, arranging follow up, and post hospitalization instructions and education total time exceeds 30 minutes.     Miguelangel Lowery MD  10/15/22  16:32 EDT      Dictated utilizing Dragon dictation

## 2022-10-15 NOTE — PLAN OF CARE
Goal Outcome Evaluation:  Plan of Care Reviewed With: patient        Progress: improving  Outcome Evaluation: vss, was able to get pt to room air while awake and 1L while sleep. pain controlled by prn pain meds. pt remains on clear liquids. pt has rested well during shift. K+ was replaced. labs in am. will continue to monitor.

## 2022-10-16 NOTE — PAYOR COMM NOTE
"Alex Bolanos (36 y.o. Male)                                  ATTENTION;  INPATIENT AUTHORIZATION REQUEST - ICD 10 - J45.51                                FACILITY NPI - [id number] [facility] [address] [facility] [address]                                PHYSICIAN NPI - [id number] DR. LAMBERT ARRIOLA [address] [facility] [address]                                REPLY TO UR DEPT  [phone] OR CALL  INEZ PATHAK LPN       Date of Birth   1985    Social Security Number       Address   [address] [place] [address]    Home Phone   [phone]    MRN   [id number]       Religious   None    Marital Status   Single                            Admission Date   10/14/22    Admission Type   Emergency    Admitting Provider   Yannick Arriola Jr., MD    Attending Provider       Department, Room/Bed   [facility] INTENSIVE CARE, I388/1       Discharge Date   10/15/2022    Discharge Disposition   Home or Self Care    Discharge Destination                               Attending Provider: (none)   Allergies: No Known Allergies    Isolation: None   Infection: None   Code Status: Prior    Ht: 175.3 cm (69\")   Wt: 63 kg (138 lb 14.2 oz)    Admission Cmt: None   Principal Problem: Severe persistent asthma with exacerbation [J45.51]                 Active Insurance as of 10/14/2022     Primary Coverage     Payor Plan Insurance Group Employer/Plan Group    [facility] BY DOR[employer] [facility] BY DORYS [id number]     Payor Plan Address Payor Plan Phone Number Payor Plan Fax Number Effective Dates    [address] [address]   10/1/2021 - None Entered    [facility] [address]       Subscriber Name Subscriber Birth Date Member ID       ALEX BOLANOS 1985 [id number]                 Emergency Contacts      (Rel.) Home Phone Work Phone Mobile Phone    Ann Bolanos (Mother) [phone] -- --               History & Physical      Yannick Arriola Jr., MD at " 10/14/22 0232                        Patient Care Team:  Provider, No Known as PCP - General      Subjective      Patient is a 36 y.o. male.  Asked to admit for status asthmaticus.  Patient in significant respiratory distress.  He can talk but he is sitting in the tripod position.  He has have him on 5 L nasal cannula O2 saturations are 95% but he is clearly working to breathe.  In addition to his asthma.  He has hypertension.  He is a former smoker he smoked for about 10 years but he quit back in .  He had some recent runny nose.  He has a history of some allergies.  No fevers or chills.  He has been little sweaty today and he has had a little bit of headache.  He has a history of severe asthma and sounds like he has been on the ventilator for this before multiple hospitalizations.  Patient is on Symbicort with as needed albuterol, ipratropium.  No chest pain no nausea no lower extremity pain or swelling      Review of Systems:    Minimized due to his respiratory distress    History  Past Medical History:   Diagnosis Date   • Asthma    • COPD (chronic obstructive pulmonary disease) (CMS/[facility])    • Hypertension    • Migraine      No past surgical history on file.  Social History     Socioeconomic History   • Marital status: Single   Tobacco Use   • Smoking status: Former     Packs/day: 1.00     Years: 10.00     Pack years: 10.00     Types: Cigarettes     Quit date: 2013     Years since quittin.3   • Smokeless tobacco: Never   Substance and Sexual Activity   • Alcohol use: No   • Drug use: Yes     Types: Marijuana   • Sexual activity: Defer     Family History   Problem Relation Age of Onset   • Diabetes Mother          Allergies:  Patient has no known allergies.    Medications:  Prior to Admission medications    Medication Sig Start Date End Date Taking? Authorizing Provider   azithromycin (ZITHROMAX Z-MINDY) 250 MG tablet Take 2 tablets the first day, then 1 tablet daily for 4 days. 18   Miguelangel Lowery  MD   budesonide-formoterol (SYMBICORT) 160-4.5 MCG/ACT inhaler Inhale 2 puffs 2 (Two) Times a Day. 11/11/17   Pilar Lares APRN   cetirizine (zyrTEC) 10 MG tablet Take 10 mg by mouth Daily.    Marcin Leal MD   hydrochlorothiazide (MICROZIDE) 12.5 MG capsule Take 12.5 mg by mouth Daily.    Marcin Leal MD   ipratropium (ATROVENT HFA) 17 MCG/ACT inhaler Inhale 2 puffs 4 (Four) Times a Day. 12/15/17   Gabriel Kumar MD   ipratropium-albuterol (COMBIVENT RESPIMAT)  MCG/ACT inhaler Inhale 1 puff 4 (Four) Times a Day As Needed for Wheezing.    Marcin Leal MD   ipratropium-albuterol (COMBIVENT RESPIMAT)  MCG/ACT inhaler Inhale 1 puff 4 (Four) Times a Day As Needed for Wheezing. 11/11/17   Pilar Lares APRN   ipratropium-albuterol (DUO-NEB) 0.5-2.5 mg/mL nebulizer Take 3 mL by nebulization Every 4 (Four) Hours As Needed for Shortness of Air. 6/4/17   Yannick Ruiz Jr., MD   lisinopril (PRINIVIL,ZESTRIL) 5 MG tablet Take 5 mg by mouth Daily.    Marcin Leal MD   montelukast (SINGULAIR) 10 MG tablet Take 1 tablet by mouth Every Night. 11/11/17   Pilar Lares APRN   Montelukast Sodium (SINGULAIR PO) Take  by mouth Daily.    Marcin Leal MD   predniSONE (DELTASONE) 10 MG tablet Take 4 tabs daily x 3 days, then take 3 tabs daily x 3 days, then take 2 tabs daily x 3 days, then take 1 tab daily x 3 days 8/1/18   Miguelangel Lowery MD   predniSONE (DELTASONE) 50 MG tablet Take 1 tablet by mouth Daily. 11/7/20   Karthik Gay MD     albuterol, , ,       sodium chloride, 125 mL/hr        Objective      Vital Signs  Vital Sign Min/Max for last 24 hours  Temp  Min: 99.1 °F (37.3 °C)  Max: 99.1 °F (37.3 °C)   BP  Min: 160/83  Max: 160/83   Pulse  Min: 111  Max: 120   Resp  Min: 24  Max: 24   SpO2  Min: 94 %  Max: 95 %   Flow (L/min)  Min: 6  Max: 6   No data recorded     No intake or output data in the 24 hours ending 10/14/22 0232  No intake/output  "data recorded.  Last Weight and Admission Weight    There were no vitals filed for this visit.  Flowsheet Rows    Flowsheet Row First Filed Value   Admission Height 175.3 cm (69\") Documented at 10/14/2022 0122   Admission Weight --          Body mass index is 20.67 kg/m².           Physical Exam:  General Appearance: Well-developed thin black male he is sitting up in bed he sort of holding himself with both arms down.  He is breathing about 30 times a minute.  He is able to talk and say about 3-4 words before he gets dyspneic.  He is on 3 to 5 L nasal cannula O2 saturation between 91 and 95% on that.  We just did a blood gas on this is pH is 7.37, PCO2 is 41.  His PO2 was 77.  Eyes: Conjunctiva are clear and anicteric pupils are equal  ENT: Mucous membranes are little dry no erythema no exudates  Neck: Trachea midline no visible jugular venous distention, no crepitance  Lungs: He is pretty tight.  He is sort tripoding when he sits up.  He is using accessory muscles.  He has wheezes in the upper lung fields.  Not a whole lot of air movement to the bases  Cardiac: Tachycardic regular rhythm no murmur, heart rates in the 110s to 120s  Abdomen: He is using abdominal muscles on the breathing no palpate hepatosplenomegaly  : Not examined  Musculoskeletal: Grossly normal  Skin: Warm and dry.  He is not diaphoretic  Neuro: He is alert and oriented he is cooperative  Extremities/P Vascular: No clubbing no cyanosis no edema palpable radial and dorsalis pedis pulses  MSE: He is a little anxious think he is covering it very well.  Sort of the macho type      Labs:  Results from last 7 days   Lab Units 10/14/22  0128   GLUCOSE mg/dL 105*   SODIUM mmol/L 143   POTASSIUM mmol/L 3.4*   CO2 mmol/L 25.3   CHLORIDE mmol/L 103   ANION GAP mmol/L 14.7   CREATININE mg/dL 0.95   BUN mg/dL 7   BUN / CREAT RATIO  7.4   CALCIUM mg/dL 9.9   ALK PHOS U/L 60   TOTAL PROTEIN g/dL 8.0   ALT (SGPT) U/L 26   AST (SGOT) U/L 24   BILIRUBIN mg/dL " 0.2   ALBUMIN g/dL 5.20   GLOBULIN gm/dL 2.8     CrCl cannot be calculated (Unknown ideal weight.).      Results from last 7 days   Lab Units 10/14/22  0128   WBC 10*3/mm3 11.10*   RBC 10*6/mm3 5.68   HEMOGLOBIN g/dL 15.8   HEMATOCRIT % 47.3   MCV fL 83.3   MCH pg 27.8   MCHC g/dL 33.4   RDW % 13.8   RDW-SD fl 41.7   MPV fL 11.1   PLATELETS 10*3/mm3 277   NEUTROPHIL % % 51.8   LYMPHOCYTE % % 28.7   MONOCYTES % % 10.5   EOSINOPHIL % % 7.8*   BASOPHIL % % 0.8   IMM GRAN % % 0.4   NEUTROS ABS 10*3/mm3 5.75   LYMPHS ABS 10*3/mm3 3.19*   MONOS ABS 10*3/mm3 1.16*   EOS ABS 10*3/mm3 0.87*   BASOS ABS 10*3/mm3 0.09   IMMATURE GRANS (ABS) 10*3/mm3 0.04   NRBC /100 WBC 0.0         Results from last 7 days   Lab Units 10/14/22  0128   TROPONIN T ng/mL <0.010     Results from last 7 days   Lab Units 10/14/22  0128   PROBNP pg/mL 51.9                 Microbiology Results (last 10 days)     ** No results found for the last 240 hours. **            Diagnostics:  XR Chest 2Vw    Result Date: 10/11/2022  REVIEWING YOUR TEST RESULTS IN [facility] IS NOT A SUBSTITUTE FOR DISCUSSING THOSE RESULTS WITH YOUR HEALTH CARE PROVIDER. PLEASE CONTACT YOUR PROVIDER VIA [facility] TO DISCUSS ANY QUESTIONS OR CONCERNS YOU MAY HAVE REGARDING THESE TEST RESULTS.  RADIOLOGY REPORT FACILITY:  [facility] UNIT/AGE/GENDER: M.RAD  OP      AGE:36 Y          SEX:M PATIENT NAME/:  ALEX LUI M    1985 UNIT NUMBER:  [id number] ACCOUNT NUMBER:  [id number] ACCESSION NUMBER:  [id number] EXAMINATION: XR CHEST 2VW DATE: 10/11/2022 HISTORY: Asthma. COMPARISON: 2020 FINDINGS: Two views of the chest demonstrate clear lungs. There is no pneumothorax or pleural effusion. The heart size and mediastinal contour are normal.     IMPRESSION: No acute cardiopulmonary radiographic abnormality. Dictated by: Ronnell Adkins M.D. Images and Report reviewed and interpreted by: Ronnell Adkins M.D. <PS><Electronically signed  by: Ronnell Adkins M.D.> 10/11/2022 1104 D: 10/11/2022 1104 T: 10/11/2022 1104    XR CHEST 1 VW PORTABLE    Result Date: 10/2/2022  INDICATION:  Shortness of breath. TECHNIQUE:  Frontal chest was obtained at 10:54 hours. COMPARISON:  9/13/2022 XR chest. FINDINGS:   The cardiomediastinal silhouette is normal in size.  Lungs are mildly hyperinflated without focal infiltrates, edema or effusion..  There are no pleural effusions. There is no pneumothorax.  No acute osseous process.   IMPRESSION: Mild hyperinflation without focal infiltrates, edema or effusion. Signed by Stacey Hewitt MD ##### Final ##### Dictated by:    STACEY HEWITT RAD-RAD Dictated DT/TM: 10/02/2022 11:45 am Interpreted and electronically signed by:  STACEY HEWITT RAD-RAD Signed DT/TM:  10/02/2022 11:50 am        Chest x-ray reviewed, there may be some hyperinflation but otherwise clear    Assessment & Plan     1. Status asthmaticus.  Patient is very concerning to me.  His PCO2 is normal.  He is working hard to breathe.  He is not able to blow down.  He has had steroids and a number of breathing treatments and has not turned around he had 2 g of magnesium in the emergency department.  We will continue systemic steroids and bronchodilators.  He does not have an identifiable infection.  His respiratory pathogen panel was negative.  Procalcitonin is normal.  I do not think antibiotics are indicated.  I have a low threshold to intubate the patient if he deteriorates any further.  He would obviously like to avoid this but there is very little room for further deterioration without catastrophe and I told him that.  2. Hypertension blood pressure is up quite a bit may be due to medications and his underlying essential hypertension compounded by respiratory stress.      Yannick Ruiz Jr, MD  10/14/22  02:32 EDT    Time: Critical care time 49 minutes    Electronically signed by Yannick Ruiz Jr., MD at 10/14/22 0518          Emergency  Department Notes      Nataliia Isabel RN at 10/14/22 0120        To ER via EMS from home. SOA x 1 hour.  Hx asthma.  Alb MN x 2. Unable to speak in full sentence. Wheezes bilaterally.     Triage staff in appropriate PPE.    Electronically signed by Nataliia Isabel RN at 10/14/22 0124     Damir Pelaez MD at 10/14/22 0122           EMERGENCY DEPARTMENT ENCOUNTER    Room Number:  I388/1  Date of encounter:  10/14/2022  PCP: Provider, No Known  Historian: Patient and EMS      HPI:  Chief Complaint: Dyspnea, wheezing    A complete HPI/ROS/PMH/PSH/SH/FH are unobtainable due to: Dyspnea    Context: Craig Bolanos is a 36 y.o. male who presents to the ED c/o sudden onset of wheezing and dyspnea which started tonight.  On EMS arrival, the fire department was giving him a albuterol nebulizer treatment and his saturations were around 90%.  In route, he suddenly became diaphoretic and short of breath and was given another albuterol breathing treatment.  On arrival emergency department, he is tripoding, is in moderate to severe respiratory distress and is diaphoretic.  History is limited due to his dyspnea.  He reports he has had a cough productive of clear sputum.  No fevers or chills.    Summary of prior records: No recent records at [facility].  He did see Dr. Cordero with [facility] yesterday to establish care.  He has severe persistent asthma.    The patient was placed in a mask in triage, hand hygiene was performed before and after my interaction with the patient.  I wore a mask, safety glasses and gloves during my entire interaction with the patient.    PAST MEDICAL HISTORY  Active Ambulatory Problems     Diagnosis Date Noted   • Severe persistent asthma with status asthmaticus 06/01/2017   • COPD exacerbation (HCC) 09/17/2017   • Benign essential HTN 09/17/2017   • Hilar lymphadenopathy 09/19/2017     Resolved Ambulatory Problems     Diagnosis Date Noted   • Acute respiratory  failure with hypoxia (HCC) 2017     Past Medical History:   Diagnosis Date   • Asthma    • COPD (chronic obstructive pulmonary disease) (CMS/[facility])    • Hypertension    • Migraine          PAST SURGICAL HISTORY  No past surgical history on file.      FAMILY HISTORY  Family History   Problem Relation Age of Onset   • Diabetes Mother          SOCIAL HISTORY  Social History     Socioeconomic History   • Marital status: Single   Tobacco Use   • Smoking status: Former     Packs/day: 1.00     Years: 10.00     Pack years: 10.00     Types: Cigarettes     Quit date: 2013     Years since quittin.3   • Smokeless tobacco: Never   Substance and Sexual Activity   • Alcohol use: No   • Drug use: Yes     Types: Marijuana   • Sexual activity: Defer         ALLERGIES  Patient has no known allergies.        REVIEW OF SYSTEMS  Review of Systems   Unable to perform ROS: Acuity of condition   Constitutional: Negative for fever.   Respiratory: Positive for cough, shortness of breath and wheezing.              PHYSICAL EXAM    I have reviewed the triage vital signs and nursing notes.    ED Triage Vitals [10/14/22 0121]   Temp Heart Rate Resp BP SpO2   -- 111 24 -- 95 %      Temp src Heart Rate Source Patient Position BP Location FiO2 (%)   -- -- -- -- --       Physical Exam   Constitutional: Pt. is awake and alert.  He is in moderate/severe respiratory distress-he is sitting up in a tripod position with audible wheezing and is diaphoretic.  HENT: Normocephalic and atraumatic.   Neck: Normal range of motion. Neck supple. No JVD present.   Cardiovascular: Tachycardic rate, regular rhythm and normal heart sounds. No murmur heard.  Pulmonary/Chest: Markedly increased work of breathing with diffuse wheezing throughout.    Abdominal: Soft.  There is no tenderness. There is no rebound and no guarding.   Musculoskeletal: Normal range of motion. No edema, tenderness or deformity.   Neurological: Pt. is awake and alert.  He appears  oriented.  He has no obvious neurologic deficits.  Skin: Diaphoretic.   Psychiatric: Anxious, but pleasant and cooperative.  Nursing note and vitals reviewed.        LAB RESULTS  Recent Results (from the past 24 hour(s))   Comprehensive Metabolic Panel    Collection Time: 10/14/22  1:28 AM    Specimen: Blood   Result Value Ref Range    Glucose 105 (H) 65 - 99 mg/dL    BUN 7 6 - 20 mg/dL    Creatinine 0.95 0.76 - 1.27 mg/dL    Sodium 143 136 - 145 mmol/L    Potassium 3.4 (L) 3.5 - 5.2 mmol/L    Chloride 103 98 - 107 mmol/L    CO2 25.3 22.0 - 29.0 mmol/L    Calcium 9.9 8.6 - 10.5 mg/dL    Total Protein 8.0 6.0 - 8.5 g/dL    Albumin 5.20 3.50 - 5.20 g/dL    ALT (SGPT) 26 1 - 41 U/L    AST (SGOT) 24 1 - 40 U/L    Alkaline Phosphatase 60 39 - 117 U/L    Total Bilirubin 0.2 0.0 - 1.2 mg/dL    Globulin 2.8 gm/dL    A/G Ratio 1.9 g/dL    BUN/Creatinine Ratio 7.4 7.0 - 25.0    Anion Gap 14.7 5.0 - 15.0 mmol/L    eGFR 107.0 >60.0 mL/min/1.73   BNP    Collection Time: 10/14/22  1:28 AM    Specimen: Blood   Result Value Ref Range    proBNP 51.9 0.0 - 450.0 pg/mL   Troponin    Collection Time: 10/14/22  1:28 AM    Specimen: Blood   Result Value Ref Range    Troponin T <0.010 0.000 - 0.030 ng/mL   CBC Auto Differential    Collection Time: 10/14/22  1:28 AM    Specimen: Blood   Result Value Ref Range    WBC 11.10 (H) 3.40 - 10.80 10*3/mm3    RBC 5.68 4.14 - 5.80 10*6/mm3    Hemoglobin 15.8 13.0 - 17.7 g/dL    Hematocrit 47.3 37.5 - 51.0 %    MCV 83.3 79.0 - 97.0 fL    MCH 27.8 26.6 - 33.0 pg    MCHC 33.4 31.5 - 35.7 g/dL    RDW 13.8 12.3 - 15.4 %    RDW-SD 41.7 37.0 - 54.0 fl    MPV 11.1 6.0 - 12.0 fL    Platelets 277 140 - 450 10*3/mm3    Neutrophil % 51.8 42.7 - 76.0 %    Lymphocyte % 28.7 19.6 - 45.3 %    Monocyte % 10.5 5.0 - 12.0 %    Eosinophil % 7.8 (H) 0.3 - 6.2 %    Basophil % 0.8 0.0 - 1.5 %    Immature Grans % 0.4 0.0 - 0.5 %    Neutrophils, Absolute 5.75 1.70 - 7.00 10*3/mm3    Lymphocytes, Absolute 3.19 (H) 0.70 -  3.10 10*3/mm3    Monocytes, Absolute 1.16 (H) 0.10 - 0.90 10*3/mm3    Eosinophils, Absolute 0.87 (H) 0.00 - 0.40 10*3/mm3    Basophils, Absolute 0.09 0.00 - 0.20 10*3/mm3    Immature Grans, Absolute 0.04 0.00 - 0.05 10*3/mm3    nRBC 0.0 0.0 - 0.2 /100 WBC   Procalcitonin    Collection Time: 10/14/22  1:28 AM    Specimen: Blood   Result Value Ref Range    Procalcitonin 0.04 0.00 - 0.25 ng/mL   Lactic Acid, Plasma    Collection Time: 10/14/22  3:14 AM    Specimen: Arm, Right; Blood   Result Value Ref Range    Lactate 1.7 0.5 - 2.0 mmol/L   ECG 12 Lead    Collection Time: 10/14/22  3:16 AM   Result Value Ref Range    QT Interval 331 ms   Respiratory Panel PCR w/COVID-19(SARS-CoV-2) PAMELA/ROSALINDA/JASON/PAD/COR/MAD/VALERIA In-House, NP Swab in [facility], 3-4 HR TAT - Swab, Nasopharynx    Collection Time: 10/14/22  3:20 AM    Specimen: Nasopharynx; Swab   Result Value Ref Range    ADENOVIRUS, PCR Not Detected Not Detected    Coronavirus 229E Not Detected Not Detected    Coronavirus HKU1 Not Detected Not Detected    Coronavirus NL63 Not Detected Not Detected    Coronavirus OC43 Not Detected Not Detected    COVID19 Not Detected Not Detected - Ref. Range    Human Metapneumovirus Not Detected Not Detected    Human Rhinovirus/Enterovirus Not Detected Not Detected    Influenza A PCR Not Detected Not Detected    Influenza B PCR Not Detected Not Detected    Parainfluenza Virus 1 Not Detected Not Detected    Parainfluenza Virus 2 Not Detected Not Detected    Parainfluenza Virus 3 Not Detected Not Detected    Parainfluenza Virus 4 Not Detected Not Detected    RSV, PCR Not Detected Not Detected    Bordetella pertussis pcr Not Detected Not Detected    Bordetella parapertussis PCR Not Detected Not Detected    Chlamydophila pneumoniae PCR Not Detected Not Detected    Mycoplasma pneumo by PCR Not Detected Not Detected   POC Glucose Once    Collection Time: 10/14/22  4:46 AM    Specimen: Blood   Result Value Ref Range    Glucose 123 70 - 130 mg/dL    Blood Gas, Arterial -    Collection Time: 10/14/22  5:00 AM    Specimen: Arterial Blood   Result Value Ref Range    Site Arterial: left brachial     Donnell's Test N/A     pH, Arterial 7.377 7.350 - 7.450 pH units    pCO2, Arterial 41.0 35.0 - 45.0 mm Hg    pO2, Arterial 77.2 (L) 80.0 - 100.0 mm Hg    HCO3, Arterial 24.1 22.0 - 28.0 mmol/L    Base Excess, Arterial -1.1 (L) 0.0 - 2.0 mmol/L    O2 Saturation Calculated 95.0 92.0 - 99.0 %    Barometric Pressure for Blood Gas 745.8 mmHg    Modality Cannula     Flow Rate 6 lpm    Rate 18 Breaths/minute       Ordered the above labs and independently reviewed the results.        RADIOLOGY  XR Chest 1 View    Result Date: 10/14/2022  Patient: ALEX LUI  Time Out: 02:46 Exam(s): FILM CXR 1 VIEW EXAM:   XR Chest, 1 View CLINICAL HISTORY:      Shortness of breath. TECHNIQUE:   Frontal view of the chest. COMPARISON:   Chest radiograph 11 7 2020 FINDINGS:   Lungs:  Unremarkable.  No consolidation.   Pleural space:  Unremarkable.  No pneumothorax.   Heart:  Unremarkable.  No cardiomegaly.   Mediastinum:  Unremarkable.   Bones joints:  Unremarkable. IMPRESSION:       Normal chest x-ray.     Electronically signed by Anu Johnson MD on 10-14-22 at 0246      I ordered the above noted radiological studies. Reviewed by me and discussed with radiologist.  See dictation for official radiology interpretation.      PROCEDURES    Procedures      MEDICATIONS GIVEN IN ER    Medications   sodium chloride 0.9 % flush 10 mL (has no administration in time range)   albuterol (PROVENTIL) (2.5 MG/3ML) 0.083% nebulizer solution  - ADS Override Pull (has no administration in time range)   ipratropium-albuterol (DUO-NEB) nebulizer solution 3 mL (has no administration in time range)   ipratropium-albuterol (DUO-NEB) nebulizer solution 3 mL (has no administration in time range)   methylPREDNISolone sodium succinate (SOLU-Medrol) injection 60 mg (has no administration in time range)   amLODIPine  (NORVASC) tablet 10 mg (has no administration in time range)   sodium chloride 0.9 % flush 10 mL (has no administration in time range)   sodium chloride 0.9 % flush 10 mL (has no administration in time range)   Pharmacy to Dose enoxaparin (LOVENOX) (has no administration in time range)   dextrose 5 % and sodium chloride 0.45 % infusion (100 mL/hr Intravenous New Bag 10/14/22 0529)   acetaminophen (TYLENOL) tablet 650 mg (has no administration in time range)     Or   acetaminophen (TYLENOL) suppository 650 mg (has no administration in time range)   enalaprilat (VASOTEC) injection 0.625 mg (has no administration in time range)   ondansetron (ZOFRAN) injection 4 mg (has no administration in time range)   pantoprazole (PROTONIX) injection 40 mg (has no administration in time range)   Enoxaparin Sodium (LOVENOX) syringe 40 mg (has no administration in time range)   influenza vac split quad (FLUZONE,FLUARIX,AFLURIA,FLULAVAL) injection 0.5 mL (has no administration in time range)   albuterol (PROVENTIL) nebulizer solution 0.083% 2.5 mg/3mL (10 mg Nebulization Given 10/14/22 0133)   methylPREDNISolone sodium succinate (SOLU-Medrol) injection 125 mg (125 mg Intravenous Given 10/14/22 0131)   magnesium sulfate 2g/50 mL (PREMIX) infusion (0 g Intravenous Stopped 10/14/22 0343)   sodium chloride 0.9 % bolus 500 mL (500 mL Intravenous New Bag 10/14/22 0142)   albuterol (PROVENTIL) nebulizer solution 0.083% 2.5 mg/3mL (10 mg Nebulization Given 10/14/22 0228)   EPINEPHrine (Anaphylaxis) (ADRENALIN) 1 MG/ML injection  - ADS Override Pull (  Override Pull-Not given 10/14/22 0510)         PROGRESS, DATA ANALYSIS, CONSULTS, AND MEDICAL DECISION MAKING    Any/all labs have been independently reviewed by me.  Any/all radiology studies have been reviewed by me and discussed with radiologist dictating the report.   EKG's independently viewed and interpreted by me.  Discussion below represents my analysis of pertinent findings related to  patient's condition, differential diagnosis, treatment plan and final disposition.    Number of Diagnoses or Management Options     Amount and/or Complexity of Data Reviewed  Clinical lab tests: Yes  Tests in the radiology section of CPT®: Yes  Tests in the medicine section of CPT®: Yes  Review and summarize past medical records:  (Yes - see HPI)  Independent visualization of images, tracings, or specimens: (Yes - see below)      ED Course as of 10/14/22 0540   Fri Oct 14, 2022   0141 Reevaluation-patient receiving a continuous albuterol neb.  He is wheezing is only slightly improved.  He remains awake and alert. [WC]   0206 proBNP: 51.9 [WC]   0219 Troponin T: <0.010 [WC]   0231 Case discussed with Dr. Ruiz (pulmonary & critical care medicine)-he agrees to admit the patient to the ICU. [WC]   0233 30 minutes of critical care provided. This time excludes other billable procedures. Time does include preparation of documents, medical consultations, review of old records, and direct bedside care. Patient is at high risk for life-threatening deterioration due to acute severe asthma exacerbation.    [WC]   0318 Chest x-ray was independently visualized by me and discussed with/interpreted by the night shift radiology service (radiology).  There are no acute processes identified.  For official interpretation, see dictated report. [WC]   0319 EKG performed at 3:16 AM and interpreted by me shows sinus tachycardia with a rate of 110 bpm.  MA intervals, QS complexes and ST-T segments are unremarkable.  There is no significant change compared to 12/15/2017. [WC]      ED Course User Index  [WC] Damir Pelaez MD       AS OF 05:40 EDT VITALS:    BP - (!) 159/103  HR - 107  TEMP - 98.2 °F (36.8 °C) (Axillary)  02 SATS - 96%        DIAGNOSIS  Final diagnoses:   Severe persistent asthma with status asthmaticus         DISPOSITION  Admitted - ICU          Note Disclaimer: At [facility], we believe that sharing information  builds trust and better relationships. You are receiving this note because you recently visited [facility]. It is possible you will see health information before a provider has talked with you about it. This kind of information can be easy to misunderstand. To help you fully understand what it means for your health, we urge you to discuss this note with your provider.\         Damir Pelaez MD  10/14/22 0233       Damir Pelaez MD  10/14/22 0408       Damir Pelaez MD  10/14/22 0540      Electronically signed by Damir Pelaez MD at 10/14/22 0540     aJckeline Barnett RN at 10/14/22 0340          .Nursing report ED to floor  Craig Bolanos  36 y.o.  male    HPI :   Chief Complaint   Patient presents with   • Shortness of Breath       Admitting doctor:   Yannick Ruiz Jr., MD    Admitting diagnosis:   The encounter diagnosis was Severe persistent asthma with exacerbation.    Code status:   Current Code Status       Date Active Code Status Order ID Comments User Context       Prior            Allergies:   Patient has no known allergies.    Isolation:   No active isolations    Intake and Output  No intake or output data in the 24 hours ending 10/14/22 0340    Weight:   There were no vitals filed for this visit.    Most recent vitals:   Vitals:    10/14/22 0133 10/14/22 0228 10/14/22 0333 10/14/22 0339   BP:  (!) 141/101  170/96   BP Location:    Left arm   Patient Position:    Lying   Pulse: 120 115 (!) 123    Resp: 24  18    Temp:       SpO2: 94% 95% 94%    Height:           Active LDAs/IV Access:   Lines, Drains & Airways       Active LDAs       Name Placement date Placement time Site Days    Peripheral IV 10/14/22 0131 Anterior;Right Forearm 10/14/22  0131  Forearm  less than 1                    Labs (abnormal labs have a star):   Labs Reviewed   COMPREHENSIVE METABOLIC PANEL - Abnormal; Notable for the following components:       Result Value    Glucose 105 (*)     Potassium 3.4 (*)     All  other components within normal limits    Narrative:     GFR Normal >60  Chronic Kidney Disease <60  Kidney Failure <15     CBC WITH AUTO DIFFERENTIAL - Abnormal; Notable for the following components:    WBC 11.10 (*)     Eosinophil % 7.8 (*)     Lymphocytes, Absolute 3.19 (*)     Monocytes, Absolute 1.16 (*)     Eosinophils, Absolute 0.87 (*)     All other components within normal limits   BNP (IN-HOUSE) - Normal    Narrative:     Among patients with dyspnea, NT-proBNP is highly sensitive for the detection of acute congestive heart failure. In addition NT-proBNP of <300 pg/ml effectively rules out acute congestive heart failure with 99% negative predictive value.    Results may be falsely decreased if patient taking Biotin.     TROPONIN (IN-HOUSE) - Normal    Narrative:     Troponin T Reference Range:  <= 0.03 ng/mL-   Negative for AMI  >0.03 ng/mL-     Abnormal for myocardial necrosis.  Clinicians would have to utilize clinical acumen, EKG, Troponin and serial changes to determine if it is an Acute Myocardial Infarction or myocardial injury due to an underlying chronic condition.       Results may be falsely decreased if patient taking Biotin.     RESPIRATORY PANEL PCR W/ COVID-19 (SARS-COV-2) PAMELA/ROSALINDA/JASON/PAD/COR/MAD/VALERIA IN-HOUSE, NP SWAB IN [facility], 3-4 HR TAT   LACTIC ACID, PLASMA   PROCALCITONIN   CBC AND DIFFERENTIAL    Narrative:     The following orders were created for panel order CBC & Differential.  Procedure                               Abnormality         Status                     ---------                               -----------         ------                     CBC Auto Differential[[id number]]        Abnormal            Final result                 Please view results for these tests on the individual orders.       EKG:   ECG 12 Lead   Preliminary Result   HEART RATE= 110  bpm   RR Interval= 545  ms   NV Interval= 149  ms   P Horizontal Axis= -28  deg   P Front Axis= 82  deg   QRSD Interval= 90   ms   QT Interval= 331  ms   QRS Axis= 73  deg   T Wave Axis= 9  deg   - ABNORMAL ECG -   Sinus tachycardia   Consider right atrial enlargement   Consider left ventricular hypertrophy   Electronically Signed By:    Date and Time of Study: 2022-10-14 03:16:35          Meds given in ED:   Medications   sodium chloride 0.9 % flush 10 mL (has no administration in time range)   sodium chloride 0.9 % infusion (has no administration in time range)   albuterol (PROVENTIL) (2.5 MG/3ML) 0.083% nebulizer solution  - ADS Override Pull (has no administration in time range)   albuterol (PROVENTIL) nebulizer solution 0.083% 2.5 mg/3mL (10 mg Nebulization Given 10/14/22 0133)   methylPREDNISolone sodium succinate (SOLU-Medrol) injection 125 mg (125 mg Intravenous Given 10/14/22 013)   magnesium sulfate 2g/50 mL (PREMIX) infusion (2 g Intravenous New Bag 10/14/22 013)   sodium chloride 0.9 % bolus 500 mL (500 mL Intravenous New Bag 10/14/22 014)   albuterol (PROVENTIL) nebulizer solution 0.083% 2.5 mg/3mL (10 mg Nebulization Given 10/14/22 0228)       Imaging results:  XR Chest 1 View    Result Date: 10/14/2022  Electronically signed by Anu Johnson MD on 10-14-22 at 0246     Ambulatory status:   - stand by    Social issues:   Social History     Socioeconomic History   • Marital status: Single   Tobacco Use   • Smoking status: Former     Packs/day: 1.00     Years: 10.00     Pack years: 10.00     Types: Cigarettes     Quit date: 2013     Years since quittin.3   • Smokeless tobacco: Never   Substance and Sexual Activity   • Alcohol use: No   • Drug use: Yes     Types: Marijuana   • Sexual activity: Defer       NIH Stroke Scale:         Jackeline Barnett RN  10/14/22 03:40 EDT          Electronically signed by Jackeline Barnett RN at 10/14/22 0340       Vital Signs (last 3 days) before discharge     Date/Time Temp Temp src Pulse Resp BP Patient Position SpO2    10/15/22 1800 -- -- 108 -- 124/96 -- 94    10/15/22 1700 -- --  112 -- 132/77 -- 93    10/15/22 1611 -- -- 105 -- -- -- 93    10/15/22 1600 -- -- 84 -- 118/69 -- 92    10/15/22 1500 98.5 (36.9) Oral 96 -- 114/68 -- 94    10/15/22 1400 -- -- 89 -- 104/64 -- 94    10/15/22 1300 -- -- 93 -- 130/83 -- 94    10/15/22 1200 -- -- 85 -- 120/73 -- 95    10/15/22 1154 98.2 (36.8) Oral 101 -- -- -- 96    10/15/22 1148 -- -- 98 18 -- -- 94    10/15/22 1100 -- -- 117 -- 118/66 -- 90    10/15/22 1000 -- -- 75 -- 114/69 -- 92    10/15/22 0900 -- -- 78 -- 118/71 -- 90    10/15/22 0800 -- -- 73 -- 106/60 -- 95    10/15/22 0740 97.9 (36.6) Oral 74 -- -- -- 94    10/15/22 0700 -- -- 74 -- 106/64 -- 94    10/15/22 0600 -- -- 83 -- 97/62 -- 93    10/15/22 0500 -- -- 69 -- 109/57 -- 92    10/15/22 0409 -- -- 68 18 -- -- 92    10/15/22 0406 97.4 (36.3) Oral -- -- -- -- --    10/15/22 0400 -- -- 84 -- 121/77 -- 92    10/15/22 0300 -- -- 90 -- 100/81 -- 91    10/15/22 0200 -- -- 75 -- 113/66 -- 94    10/15/22 0100 -- -- 85 -- 125/80 -- 95    10/15/22 0042 -- -- -- -- -- -- 89    10/15/22 0000 98.1 (36.7) Oral 107 -- 125/77 -- 91    10/14/22 2309 -- -- 90 18 -- -- 95    10/14/22 2300 -- -- 90 -- 155/89 -- 94    10/14/22 2100 -- -- 65 -- 122/93 -- 95    10/14/22 2027 -- -- 83 26 -- -- 95    10/14/22 2013 98 (36.7) Oral -- -- -- -- --    10/14/22 1800 -- -- 80 -- 116/79 -- 98    10/14/22 1700 -- -- 84 -- 116/69 -- 96    10/14/22 1607 97.7 (36.5) Oral -- -- -- -- --    10/14/22 1600 -- -- 72 -- 128/92 -- 98    10/14/22 1532 -- -- 91 -- -- -- 95    10/14/22 1500 -- -- 86 -- 133/79 -- 95    10/14/22 1400 -- -- 102 -- 136/87 -- 96    10/14/22 1300 -- -- 97 -- 136/84 -- 96    10/14/22 1200 -- -- 107 -- 160/101 -- 97    10/14/22 1100 -- -- 108 -- 137/114 -- 95    10/14/22 1050 -- -- 94 -- -- -- 95    10/14/22 1000 -- -- 84 -- 144/90 -- 95    10/14/22 0900 -- -- 107 -- 143/106 -- 96    10/14/22 0800 -- -- 126 28 132/96 Sitting 93    10/14/22 0758 97.3 (36.3) Oral -- -- -- -- --    10/14/22 0703 -- -- 117 --  144/100 -- 91    10/14/22 0600 -- -- 94 34 141/101 -- 95    10/14/22 0451 98.2 (36.8) Axillary 107 12 159/103 Lying 96    10/14/22 03:39:51 -- -- -- -- 170/96 Lying --    10/14/22 03:33:52 -- -- 123 18 -- Lying 94    10/14/22 0228 -- -- 115 -- 141/101 -- 95    10/14/22 0133 -- -- 120 24 -- -- 94    10/14/22 0122 99.1 (37.3) -- -- 24 160/83 -- --    10/14/22 0121 -- -- 111 24 -- -- 95          Oxygen Therapy (last 3 days) before discharge     Date/Time SpO2 Device (Oxygen Therapy) Flow (L/min) Oxygen Concentration (%) ETCO2 (mmHg)    10/15/22 1800 94 -- -- -- --    10/15/22 1700 93 -- -- -- --    10/15/22 1611 93 -- -- -- --    10/15/22 1600 92 -- -- -- --    10/15/22 1500 94 -- -- -- --    10/15/22 1400 94 -- -- -- --    10/15/22 1300 94 -- -- -- --    10/15/22 1200 95 -- -- -- --    10/15/22 1154 96 -- -- -- --    10/15/22 1148 94 -- 1 -- --    10/15/22 1100 90 -- -- -- --    10/15/22 1000 92 -- -- -- --    10/15/22 0900 90 -- -- -- --    10/15/22 0800 95 room air -- -- --    10/15/22 0740 94 nasal cannula -- -- --    10/15/22 0700 94 -- -- -- --    10/15/22 0600 93 -- -- -- --    10/15/22 0500 92 -- -- -- --    10/15/22 0409 92 nasal cannula 1 -- --    10/15/22 0400 92 nasal cannula 1 -- --    10/15/22 0300 91 -- -- -- --    10/15/22 0200 94 nasal cannula 1 -- --    10/15/22 0100 95 -- -- -- --    10/15/22 0042 89 nasal cannula 1 -- --    10/15/22 0000 91 room air -- -- --    10/14/22 2309 95 room air -- -- --    10/14/22 2300 94 -- -- -- --    10/14/22 2100 95 -- -- -- --    10/14/22 2027 95 nasal cannula 1 -- --    10/14/22 2000 -- room air -- -- --    10/14/22 1800 98 -- -- -- --    10/14/22 1700 96 -- -- -- --    10/14/22 1600 98 nasal cannula 1 -- --    10/14/22 1532 95 nasal cannula 1 -- --    10/14/22 1500 95 -- -- -- --    10/14/22 1400 96 -- -- -- --    10/14/22 1300 96 nasal cannula 2 -- --    10/14/22 1200 97 -- -- -- --    10/14/22 1100 95 -- -- -- --    10/14/22 1050 95 nasal cannula 4 -- --     10/14/22 1000 95 -- -- -- --    10/14/22 0900 96 -- -- -- --    10/14/22 0800 93 nasal cannula 5 -- --    10/14/22 0703 91 nasal cannula 4 -- --    10/14/22 0624 -- -- 4 -- --    10/14/22 0600 95 -- 5 -- --    10/14/22 0500 -- nasal cannula 5 -- --    10/14/22 0451 96 nasal cannula 5 -- --    10/14/22 03:33:52 94 -- 3 -- --    10/14/22 0228 95 -- -- -- --    10/14/22 0133 94 nasal cannula 6 -- --    10/14/22 012 95 nasal cannula 6 -- --          Facility-Administered Medications as of 10/15/2022   Medication Dose Route Frequency Provider Last Rate Last Admin   • [] albuterol (PROVENTIL) (2.5 MG/3ML) 0.083% nebulizer solution  - ADS Override Pull            • [COMPLETED] albuterol (PROVENTIL) nebulizer solution 0.083% 2.5 mg/3mL  10 mg Nebulization Once Over 1 Hour Damir Pelaez MD   10 mg at 10/14/22 0133   • [COMPLETED] albuterol (PROVENTIL) nebulizer solution 0.083% 2.5 mg/3mL  10 mg Nebulization Once Over 1 Hour Damir Pelaez MD   10 mg at 10/14/22 0228   • [COMPLETED] EPINEPHrine (Anaphylaxis) (ADRENALIN) 1 MG/ML injection  - ADS Override Pull            • [COMPLETED] magnesium sulfate 2g/50 mL (PREMIX) infusion  2 g Intravenous Once Damir Pelaez MD   Stopped at 10/14/22 0343   • [COMPLETED] methylPREDNISolone sodium succinate (SOLU-Medrol) injection 125 mg  125 mg Intravenous Once Damir Pelaez MD   125 mg at 10/14/22 0131   • [COMPLETED] sodium chloride 0.9 % bolus 500 mL  500 mL Intravenous Once Damir Pelaez MD 1,000 mL/hr at 10/14/22 0142 500 mL at 10/14/22 0142     Orders (last 7 days)      Start     Ordered    10/15/22 1630  Discharge patient  Once         10/15/22 1631    10/15/22 1609  Diet Regular  Diet Effective Now,   Status:  Canceled         10/15/22 1609    10/15/22 0600  Basic Metabolic Panel  Daily,   Status:  Canceled       10/14/22 0941    10/15/22 0600  CBC (No Diff)  Daily,   Status:  Canceled       10/14/22 0941    10/15/22 0000  predniSONE (DELTASONE) 20 MG  tablet  Daily         10/15/22 1631    10/14/22 1551  Diet Clear Liquid  Diet Effective Now,   Status:  Canceled         10/14/22 1551    10/14/22 1436  Discontinue Patient Isolation  Once        Comments: Dc covid isolation    10/14/22 1436    10/14/22 1341  methylPREDNISolone sodium succinate (SOLU-Medrol) injection 60 mg  Every 8 Hours,   Status:  Discontinued         10/14/22 0941    10/14/22 1057  Patient Currently On Electrolyte Replacement Protocol - Please Refer to MAR for Protocol Details  Misc Nursing Order (Specify)  Daily,   Status:  Canceled      Comments: Patient Currently On Electrolyte Replacement Protocol - Please Refer to MAR for Protocol Details    10/14/22 1056    10/14/22 1056  potassium & sodium phosphates (PHOS-NAK) 280-160-250 MG packet - for Phosphorus less than 1.25 mg/dL  Every 6 Hours PRN,   Status:  Discontinued        See Hyperspace for full Linked Orders Report.    10/14/22 1056    10/14/22 1056  potassium & sodium phosphates (PHOS-NAK) 280-160-250 MG packet - for Phosphorus 1.25 - 2.5 mg/dL  Every 6 Hours PRN,   Status:  Discontinued        See Hyperspace for full Linked Orders Report.    10/14/22 1056    10/14/22 1056  Magnesium Sulfate 2 gram Bolus, followed by 8 gram infusion (total Mg dose 10 grams)- Mg less than or equal to 1mg/dL  As Needed,   Status:  Discontinued        See Hyperspace for full Linked Orders Report.    10/14/22 1056    10/14/22 1056  Magnesium Sulfate 2 gram / 50mL Infusion (GIVE X 3 BAGS TO EQUAL 6GM TOTAL DOSE) - Mg 1.1 - 1.5 mg/dl  As Needed,   Status:  Discontinued        See Hyperspace for full Linked Orders Report.    10/14/22 1056    10/14/22 1056  Magnesium Sulfate 4 gram infusion- Mg 1.6-1.9 mg/dL  As Needed,   Status:  Discontinued        See Hyperspace for full Linked Orders Report.    10/14/22 1056    10/14/22 1056  potassium chloride (K-DUR,KLOR-CON) ER tablet 40 mEq  As Needed,   Status:  Discontinued         10/14/22 1056    10/14/22 1056   potassium chloride (KLOR-CON) packet 40 mEq  As Needed,   Status:  Discontinued         10/14/22 1056    10/14/22 1055  Initiate & Follow Electrolyte Replacement Protocol  As Needed,   Status:  Canceled       10/14/22 1056    10/14/22 1008  oxyCODONE-acetaminophen (PERCOCET) 7.5-325 MG per tablet 1 tablet  Every 8 Hours PRN,   Status:  Discontinued         10/14/22 1008    10/14/22 0900  sodium chloride 0.9 % flush 10 mL  Every 12 Hours Scheduled,   Status:  Discontinued         10/14/22 0516    10/14/22 0900  Enoxaparin Sodium (LOVENOX) syringe 40 mg  Every 24 Hours,   Status:  Discontinued         10/14/22 0522    10/14/22 0730  ipratropium-albuterol (DUO-NEB) nebulizer solution 3 mL  Every 4 Hours - RT,   Status:  Discontinued         10/14/22 0500    10/14/22 0615  dextrose 5 % and sodium chloride 0.45 % infusion  Continuous,   Status:  Discontinued         10/14/22 0516    10/14/22 0615  pantoprazole (PROTONIX) injection 40 mg  Every Early Morning,   Status:  Discontinued         10/14/22 0516    10/14/22 0600  methylPREDNISolone sodium succinate (SOLU-Medrol) injection 60 mg  Every 12 Hours,   Status:  Discontinued         10/14/22 0500    10/14/22 0600  amLODIPine (NORVASC) tablet 10 mg  Every 24 Hours Scheduled,   Status:  Discontinued         10/14/22 0512    10/14/22 0600  Vital Signs Every Hour and Per Hospital Policy Based on Patient Condition  Every Hour,   Status:  Canceled       10/14/22 0516    10/14/22 0600  Intake and Output  Every Hour,   Status:  Canceled       10/14/22 0516    10/14/22 0528  influenza vac split quad (FLUZONE,FLUARIX,AFLURIA,FLULAVAL) injection 0.5 mL  During Hospitalization,   Status:  Discontinued         10/14/22 0528    10/14/22 0519  Urine Drug Screen - Urine, Clean Catch  Once         10/14/22 0518    10/14/22 0517  Daily Weights  Daily,   Status:  Canceled       10/14/22 0516    10/14/22 0516  ondansetron (ZOFRAN) injection 4 mg  Every 6 Hours PRN,   Status:  Discontinued          10/14/22 0516    10/14/22 0515  enalaprilat (VASOTEC) injection 0.625 mg  Every 6 Hours PRN,   Status:  Discontinued         10/14/22 0516    10/14/22 0515  acetaminophen (TYLENOL) tablet 650 mg  Every 4 Hours PRN,   Status:  Discontinued        See Hyperspace for full Linked Orders Report.    10/14/22 0516    10/14/22 0515  acetaminophen (TYLENOL) suppository 650 mg  Every 4 Hours PRN,   Status:  Discontinued        See Hyperspace for full Linked Orders Report.    10/14/22 0516    10/14/22 0515  Patient requires more than 6 L O2 to keep his saturations greater than 90%.  Please get stat ABG and call us  Physician Communication Order  Once        Comments: Patient requires more than 6 L O2 to keep his saturations greater than 90%.  Please get stat ABG and call us    10/14/22 0516    10/14/22 0515  NPO Diet NPO Type: Sips with Meds  Diet Effective Now,   Status:  Canceled         10/14/22 0516    10/14/22 0514  Cardiac Monitoring  Continuous,   Status:  Canceled         10/14/22 0516    10/14/22 0514  Continuous Pulse Oximetry  Continuous,   Status:  Canceled         10/14/22 0516    10/14/22 0514  Height & Weight  Once         10/14/22 0516    10/14/22 0514  Oral care for patients not on NPPV or intubated  Every Shift,   Status:  Canceled      Comments: Oral care for patients not on NPPV or intubated    10/14/22 0516    10/14/22 0514  Use Mobility Guidelines for Advancement of Activity  Continuous,   Status:  Canceled         10/14/22 0516    10/14/22 0514  Insert Peripheral IV  Once,   Status:  Canceled         10/14/22 0516    10/14/22 0514  Saline Lock & Maintain IV Access  Continuous,   Status:  Canceled         10/14/22 0516    10/14/22 0514  Code Status and Medical Interventions:  Continuous,   Status:  Canceled         10/14/22 0516    10/14/22 0514  Capnography (ETCO2) Monitoring  Continuous,   Status:  Canceled         10/14/22 0516    10/14/22 0514  Saline Lock  Continuous,   Status:  Canceled         Comments: For standing ICU/CCU Protocol    10/14/22 0516    10/14/22 0514  Oxygen Therapy- Nasal Cannula; Titrate for SPO2: 90% - 95%  Continuous,   Status:  Canceled        Comments: For unresponsiveness, acute dyspnea, cyanosis or suspected hypoxemia    10/14/22 0516    10/14/22 0514  Continuous Pulse Oximetry  Continuous,   Status:  Canceled        Comments: Unresponsiveness, Acute Dyspnea, Cyanosis, Hypoxemia    10/14/22 0516    10/14/22 0514  Notify Provider if ACLS Protocol Activated  Until Discontinued,   Status:  Canceled         10/14/22 0516    10/14/22 0514  Place Order to Consult Intensivist For Critical Care Management (If Patient Not Admitted to Cardiology for Primary Cardiology Condition)  Continuous,   Status:  Canceled        Comments: For standing ICU/CCU Protocol    10/14/22 0516    10/14/22 0514  Notify All Physicians When Patient is Transferred  Once        Comments: For standing ICU/CCU Protocol    10/14/22 0516    10/14/22 0513  Initiate ACLS Protocol For Life Threatening Dysrhythmias (If Appropriate for Patient)  As Needed,   Status:  Canceled       10/14/22 0516    10/14/22 0513  ECG 12 Lead  As Needed,   Status:  Canceled      Comments: For ICU/CCU Protocol Orders.  Nurse to Release if Patient Experiences Acute Chest Pain or Dysrhythmias    10/14/22 0516    10/14/22 0513  Potassium  As Needed,   Status:  Canceled        Comments: Sudden Ventricular Dysrhythmias. Notify Physician.      10/14/22 0516    10/14/22 0513  Magnesium  As Needed,   Status:  Canceled        Comments: For ICU/CCU Protocol      10/14/22 0516    10/14/22 0513  Pharmacy to Dose enoxaparin (LOVENOX)  Continuous PRN,   Status:  Discontinued         10/14/22 0516    10/14/22 0513  sodium chloride 0.9 % flush 10 mL  As Needed,   Status:  Discontinued         10/14/22 0516    10/14/22 0504  Blood Gas, Arterial -  PROCEDURE ONCE         10/14/22 0500    10/14/22 0501  Blood Gas, Arterial -  STAT         10/14/22 0500     10/14/22 0459  ipratropium-albuterol (DUO-NEB) nebulizer solution 3 mL  Every 2 Hours PRN,   Status:  Discontinued         10/14/22 0500    10/14/22 0457  EPINEPHrine (Anaphylaxis) (ADRENALIN) 1 MG/ML injection  - ADS Override Pull        Note to Pharmacy: Created by cabinet override    10/14/22 0457    10/14/22 0448  POC Glucose Once  PROCEDURE ONCE         10/14/22 0446    10/14/22 0233  Inpatient Admission  Once         10/14/22 0233    10/14/22 0227  albuterol (PROVENTIL) (2.5 MG/3ML) 0.083% nebulizer solution  - ADS Override Pull        Note to Pharmacy: Created by cabinet override    10/14/22 0227    10/14/22 0225  albuterol (PROVENTIL) nebulizer solution 0.083% 2.5 mg/3mL  Once Over 1 Hour         10/14/22 0223    10/14/22 0219  Pulmonology (on-call MD unless specified)  Once,   Status:  Canceled        Specialty:  Pulmonary Disease  Provider:  (Not yet assigned)    10/14/22 0218    10/14/22 0139  sodium chloride 0.9 % bolus 500 mL  Once         10/14/22 0137    10/14/22 0139  sodium chloride 0.9 % infusion  Continuous,   Status:  Discontinued         10/14/22 0137    10/14/22 0138  Lactic Acid, Plasma  Once         10/14/22 0137    10/14/22 0138  Procalcitonin  Once         10/14/22 0137    10/14/22 0138  Respiratory Panel PCR w/COVID-19(SARS-CoV-2) PAMELA/ROSALINDA/JASON/PAD/COR/MAD/VALERIA In-House, NP Swab in UTM/VTM, 3-4 HR TAT - Swab, Nasopharynx  STAT         10/14/22 0137    10/14/22 0131  magnesium sulfate 2g/50 mL (PREMIX) infusion  Once         10/14/22 0129    10/14/22 0124  albuterol (PROVENTIL) nebulizer solution 0.083% 2.5 mg/3mL  Once Over 1 Hour         10/14/22 0122    10/14/22 0124  methylPREDNISolone sodium succinate (SOLU-Medrol) injection 125 mg  Once         10/14/22 0122    10/14/22 0123  ECG 12 Lead  Once         10/14/22 0122    10/14/22 0123  CBC & Differential  Once         10/14/22 0122    10/14/22 0123  Comprehensive Metabolic Panel  Once         10/14/22 0122    10/14/22 0123  BNP  Once          10/14/22 0122    10/14/22 0123  Troponin  Once         10/14/22 0122    10/14/22 0123  XR Chest 1 View  1 Time Imaging         10/14/22 0122    10/14/22 0123  Insert peripheral IV  Once,   Status:  Canceled        See Hyperspace for full Linked Orders Report.    10/14/22 0122    10/14/22 0123  Cardiac Monitoring  Continuous,   Status:  Canceled         10/14/22 0122    10/14/22 0123  Monitor Blood Pressure  Per Hospital Policy         10/14/22 0122    10/14/22 0123  Pulse Oximetry, Continuous  Continuous,   Status:  Canceled         10/14/22 0122    10/14/22 0123  CBC Auto Differential  PROCEDURE ONCE         10/14/22 0122    10/14/22 0122  Oxygen Therapy- Nasal Cannula; 3 LPM; Titrate for SPO2: equal to or greater than, 92%  Continuous PRN,   Status:  Canceled       10/14/22 0122    10/14/22 0122  sodium chloride 0.9 % flush 10 mL  As Needed,   Status:  Discontinued        See Hyperspace for full Linked Orders Report.    10/14/22 0122                   Physician Progress Notes       Cristian Andrea MD at 10/14/22 0806              [facility] INPATIENT PROGRESS NOTE         [facility] INTENSIVE CARE    10/14/2022      PATIENT IDENTIFICATION:  Name: Craig Bolanos ADMIT: 10/14/2022   : 1985  PCP: Provider, No Known    MRN: [id number] LOS: 0 days   AGE/SEX: 36 y.o. male  ROOM: [facility]                     LOS 0    Reason for visit: Respiratory failure with severe status asthmaticus      SUBJECTIVE:      Follow-up severe asthma with status asthmaticus.  Has finally made a turn for the better.  Work of breathing is still increased but overall improved compared to previous.  Monitoring very closely.  I am seeing the patient for the first time today.  All patient problems are new to me.      Objective   OBJECTIVE:    Vital Sign Min/Max for last 24 hours  Temp  Min: 97.3 °F (36.3 °C)  Max: 99.1 °F (37.3 °C)   BP  Min: 132/96  Max: 170/96   Pulse  Min: 94  Max: 126   Resp  Min: 12  Max: 34   SpO2  Min:  91 %  Max: 96 %   No data recorded   Weight  Min: 63.6 kg (140 lb 3.4 oz)  Max: 63.6 kg (140 lb 3.4 oz)                         Body mass index is 20.71 kg/m².    Intake/Output Summary (Last 24 hours) at 10/14/2022 0937  Last data filed at 10/14/2022 0700  Gross per 24 hour   Intake 305.84 ml   Output 150 ml   Net 155.84 ml         Exam:  GEN:  No acute distress, appears stated age  EYES:   PERRL, anicteric sclerae  ENT:    External ears/nose normal, OP clear  NECK:  No adenopathy, midline trachea  LUNGS: Mildly labored.  Normal chest on inspection, palpation and moderate wheezing bilaterally auscultation  CV:  Normal S1S2, without murmur  ABD:  Nontender, nondistended, no hepatosplenomegaly, +BS  EXT:  No edema.  No cyanosis or clubbing.  No mottling and normal cap refill.    Assessment     Scheduled meds:  albuterol, , ,   amLODIPine, 10 mg, Oral, Q24H  enoxaparin, 40 mg, Subcutaneous, Q24H  ipratropium-albuterol, 3 mL, Nebulization, Q4H - RT  methylPREDNISolone sodium succinate, 60 mg, Intravenous, Q12H  pantoprazole, 40 mg, Intravenous, Q AM  sodium chloride, 10 mL, Intravenous, Q12H      IV meds:                      dextrose 5 % and sodium chloride 0.45 %, 100 mL/hr, Last Rate: 100 mL/hr (10/14/22 0529)      Data Review:  Results from last 7 days   Lab Units 10/14/22  0128   SODIUM mmol/L 143   POTASSIUM mmol/L 3.4*   CHLORIDE mmol/L 103   CO2 mmol/L 25.3   BUN mg/dL 7   CREATININE mg/dL 0.95   GLUCOSE mg/dL 105*   CALCIUM mg/dL 9.9         Estimated Creatinine Clearance: 97.6 mL/min (by C-G formula based on SCr of 0.95 mg/dL).  Results from last 7 days   Lab Units 10/14/22  0128   WBC 10*3/mm3 11.10*   HEMOGLOBIN g/dL 15.8   PLATELETS 10*3/mm3 277         Results from last 7 days   Lab Units 10/14/22  0128   ALT (SGPT) U/L 26   AST (SGOT) U/L 24     Results from last 7 days   Lab Units 10/14/22  0500   PH, ARTERIAL pH units 7.377   PO2 ART mm Hg 77.2*   PCO2, ARTERIAL mm Hg 41.0   HCO3 ART mmol/L 24.1      Results from last 7 days   Lab Units 10/14/22  0314 10/14/22  0128   PROCALCITONIN ng/mL  --  0.04   LACTATE mmol/L 1.7  --          Glucose   Date/Time Value Ref Range Status   10/14/2022 0446 123 70 - 130 mg/dL Final     Comment:     Meter: [id number] : [id number] Korina Owens RN     Chest x-ray 10/14 reviewed      Microbiology reviewed             Active Hospital Problems    Diagnosis  POA   • **Severe persistent asthma with exacerbation [J45.51]  Yes      Resolved Hospital Problems   No resolved problems to display.         ASSESSMENT:    Status asthmaticus  Hypertension  Elevated eosinophils  Sinus tachycardia        PLAN:    Continue scheduled bronchodilators every 4 hours and as needed.  Continue IV steroid.  Increase frequency.  Certainly tenuous but seems to be going in the right direction as of now.  If increased work of breathing would add NIPPV.  If worsening bronchospasm may consider terbutaline.  Given elevated eosinophil count he may be a candidate for biologic injectable which can be determined as outpatient.  Control glucose.  Control blood pressure.  DVT prophylaxis.    Discussed with multidisciplinary ICU team on rounds this morning.          CCT: 44 min    Cristian Andrea MD  Pulmonary and Critical Care Medicine  [facility], [facility]  10/14/2022    09:37 EDT       Electronically signed by Cristian Andrea MD at 10/14/22 0942            Discharge Summary      Miguelangel Lowery MD at 10/15/22 1632            DISCHARGE SUMMARY    Patient Name: Craig Bolanos  Age/Sex: 36 y.o. male  : 1985  MRN: [id number]  Patient Care Team:  Provider, No Known as PCP - General       Date of Admit: 10/14/2022  Date of Discharge:  10/15/22  Discharge Condition: Good    Discharge Diagnoses:  1. Status asthmaticus, resolved  2. Elevated eosinophils  3. Sinus tachycardia  4. HTN    History of present illness from H&P from 10/14/2022:   Patient is a 36 y.o. male.  Asked to admit for status  asthmaticus.  Patient in significant respiratory distress.  He can talk but he is sitting in the tripod position.  He has have him on 5 L nasal cannula O2 saturations are 95% but he is clearly working to breathe.  In addition to his asthma.  He has hypertension.  He is a former smoker he smoked for about 10 years but he quit back in 2013.  He had some recent runny nose.  He has a history of some allergies.  No fevers or chills.  He has been little sweaty today and he has had a little bit of headache.  He has a history of severe asthma and sounds like he has been on the ventilator for this before multiple hospitalizations.  Patient is on Symbicort with as needed albuterol, ipratropium.  No chest pain no nausea no lower extremity pain or swelling    Hospital Course:   I was involved in the patient's care and the last day of his stay.  To summarize the records to the best of my knowledge.    Patient was admitted on 10/14 early morning.  He was in status asthmaticus.  He received intravenous steroid and DuoNeb.  He required oxygen by nasal cannula but was weaned off on discharge.  He was noted to have significant eosinophilia.  Apparently he was just seen by his new pulmonologist Dr. Cordero.  at [place] not too long ago and was prescribed Dulera but he did not have the chance to pick it up from his pharmacy and he became sick and was having significant shortness of breath and cough and was using his rescue inhaler often so he decided to come to the hospital.  Most of his symptoms improved throughout his hospitalization.  There was no evidence of clear trigger for his asthma.  His viral panel was negative.    Today, he was eager to go home.  He was having mild wheezing on exam only but did not appear to be in any kind of distress.  He said that he is going to  his Dulera and he already has albuterol inhaler and Combivent.  I will go ahead and discharge him on prednisone 40 mg daily for 6 more days.  I  emphasized about the importance of using his maintenance inhaler.  He was previously on Symbicort but could not afford it and he stated the Dulera did not cost him anything but he just has to pick it up from the pharmacy and its ready for him to .    I encouraged him to get an appointment soon with Dr. Cordero.     Consults:   IP CONSULT TO PULMONOLOGY    Significant Discharge Diagnostics   Procedures Performed:         Pertinent Lab Results:  Results from last 7 days   Lab Units 10/15/22  0354 10/14/22  0128   SODIUM mmol/L 133* 143   POTASSIUM mmol/L 5.1 3.4*   CHLORIDE mmol/L 103 103   CO2 mmol/L 23.4 25.3   BUN mg/dL 11 7   CREATININE mg/dL 0.86 0.95   GLUCOSE mg/dL 125* 105*   CALCIUM mg/dL 9.5 9.9   AST (SGOT) U/L  --  24   ALT (SGPT) U/L  --  26     Results from last 7 days   Lab Units 10/14/22  0128   TROPONIN T ng/mL <0.010     Results from last 7 days   Lab Units 10/15/22  0354 10/14/22  0128   WBC 10*3/mm3 23.91* 11.10*   HEMOGLOBIN g/dL 14.4 15.8   HEMATOCRIT % 44.2 47.3   PLATELETS 10*3/mm3 280 277   MCV fL 85.3 83.3   MCH pg 27.8 27.8   MCHC g/dL 32.6 33.4   RDW % 14.2 13.8   RDW-SD fl 43.6 41.7   MPV fL 11.1 11.1   NEUTROPHIL % %  --  51.8   LYMPHOCYTE % %  --  28.7   MONOCYTES % %  --  10.5   EOSINOPHIL % %  --  7.8*   BASOPHIL % %  --  0.8   IMM GRAN % %  --  0.4   NEUTROS ABS 10*3/mm3  --  5.75   LYMPHS ABS 10*3/mm3  --  3.19*   MONOS ABS 10*3/mm3  --  1.16*   EOS ABS 10*3/mm3  --  0.87*   BASOS ABS 10*3/mm3  --  0.09   IMMATURE GRANS (ABS) 10*3/mm3  --  0.04   NRBC /100 WBC  --  0.0                 Results from last 7 days   Lab Units 10/14/22  0128   PROBNP pg/mL 51.9         Results from last 7 days   Lab Units 10/14/22  0500   PH, ARTERIAL pH units 7.377   PCO2, ARTERIAL mm Hg 41.0   PO2 ART mm Hg 77.2*   HCO3 ART mmol/L 24.1     Results from last 7 days   Lab Units 10/14/22  0314 10/14/22  0128   PROCALCITONIN ng/mL  --  0.04   LACTATE mmol/L 1.7  --                      Results  from last 7 days   Lab Units 10/14/22  0320   ADENOVIRUS DETECTION BY PCR  Not Detected   CORONAVIRUS 229E  Not Detected   CORONAVIRUS HKU1  Not Detected   CORONAVIRUS NL63  Not Detected   CORONAVIRUS OC43  Not Detected   HUMAN METAPNEUMOVIRUS  Not Detected   HUMAN RHINOVIRUS/ENTEROVIRUS  Not Detected   INFLUENZA B PCR  Not Detected   PARAINFLUENZA 1  Not Detected   PARAINFLUENZA VIRUS 2  Not Detected   PARAINFLUENZA VIRUS 3  Not Detected   PARAINFLUENZA VIRUS 4  Not Detected   BORDETELLA PERTUSSIS PCR  Not Detected   BORDETELLA PARAPERTUSSIS PCR  Not Detected   CHLAMYDOPHILA PNEUMONIAE PCR  Not Detected   MYCOPLAMA PNEUMO PCR  Not Detected   RSV, PCR  Not Detected           Imaging Results:  Imaging Results (All)     Procedure Component Value Units Date/Time    XR Chest 1 View [[id number]] Collected: 10/14/22 0247     Updated: 10/14/22 0247    Narrative:        Patient: ALEX LUI  Time Out: 02:46  Exam(s): FILM CXR 1 VIEW     EXAM:    XR Chest, 1 View    CLINICAL HISTORY:       Shortness of breath.    TECHNIQUE:    Frontal view of the chest.    COMPARISON:    Chest radiograph 11 7 2020    FINDINGS:    Lungs:  Unremarkable.  No consolidation.    Pleural space:  Unremarkable.  No pneumothorax.    Heart:  Unremarkable.  No cardiomegaly.    Mediastinum:  Unremarkable.    Bones joints:  Unremarkable.    IMPRESSION:         Normal chest x-ray.      Impression:          Electronically signed by Anu Johnson MD on 10-14-22 at 0246          Objective:   Temp:  [97.4 °F (36.3 °C)-98.5 °F (36.9 °C)] 98.5 °F (36.9 °C)  Heart Rate:  [] 105  Resp:  [18-26] 18  BP: ()/(57-93) 114/68   SpO2:  [89 %-98 %] 93 %  on  Flow (L/min):  [1] 1 Device (Oxygen Therapy): room air    Intake/Output Summary (Last 24 hours) at 10/15/2022 1632  Last data filed at 10/15/2022 1400  Gross per 24 hour   Intake 240 ml   Output 1500 ml   Net -1260 ml     Body mass index is 20.51 kg/m².      10/14/22  0527 10/14/22  0600 10/15/22  0600    Weight: 63.6 kg (140 lb 3.4 oz) 63.6 kg (140 lb 3.4 oz) 63 kg (138 lb 14.2 oz)     Weight change: -0.6 kg (-1 lb 5.2 oz)    Physical Exam:      General: Alert, cooperative, in no acute distress.         HEENT:  No oral lesions. No thrush. Oral mucosa moist.   Chest Wall:  No abnormalities observed.             Neck:  Trachea midline. No JVD.   Pulmonary:  Equal but diminished air entry bilaterally.  Mild expiratory wheezing.  No crackles.  No use of accessory muscles          Cardio:  Regular rhythm and normal rate. Normal S1 and S2. No murmur, gallop, rub or click.    Abdominal:  Soft, non-tender and non-distended. Normal bowel sounds. No masses. No organomegaly. No guarding. No rebound tenderness.  Extremities:  Moves all extremities well. No cyanosis. No redness. No edema.     Discharge Medications and Instructions:     Discharge Medications     Discharge Medications      New Medications      Instructions Start Date   predniSONE 20 MG tablet  Commonly known as: DELTASONE   40 mg, Oral, Daily         Continue These Medications      Instructions Start Date   budesonide-formoterol 160-4.5 MCG/ACT inhaler  Commonly known as: SYMBICORT   2 puffs, Inhalation, 2 Times Daily - RT      cetirizine 10 MG tablet  Commonly known as: zyrTEC   10 mg, Oral, Daily      hydroCHLOROthiazide 12.5 MG capsule  Commonly known as: MICROZIDE   12.5 mg, Oral, Daily      ipratropium 17 MCG/ACT inhaler  Commonly known as: Atrovent HFA   2 puffs, Inhalation, 4 Times Daily - RT      ipratropium-albuterol 0.5-2.5 mg/3 ml nebulizer  Commonly known as: DUO-NEB   3 mL, Nebulization, Every 4 Hours PRN      ipratropium-albuterol  MCG/ACT inhaler  Commonly known as: COMBIVENT RESPIMAT   1 puff, Inhalation, 4 Times Daily PRN      lisinopril 5 MG tablet  Commonly known as: PRINIVIL,ZESTRIL   5 mg, Oral, Daily      montelukast 10 MG tablet  Commonly known as: Singulair   10 mg, Oral, Nightly             Discharge Diet:    Dietary Orders (From  admission, onward)     Start     Ordered    10/15/22 1609  Diet Regular  Diet Effective Now        Question:  Diet Texture / Consistency  Answer:  Regular    10/15/22 1609                Activity at Discharge:   As tolerated    Discharge disposition: Home    Discharge Instructions and Follow ups:     Follow-up Information     Provider, No Known .    Contact information:  [facility] [address] [phone]                       No future appointments.     Medication Reconciliation: Please see electronically completed Med Rec.    Total time spent discharging patient including evaluation, medication reconciliation, arranging follow up, and post hospitalization instructions and education total time exceeds 30 minutes.     Miguelangel Lowery MD  10/15/22  16:32 EDT      Dictated utilizing Dragon dictation    Electronically signed by Miguelangel Lowery MD at 10/15/22 7012

## 2022-10-19 NOTE — PROGRESS NOTES
"Enter Query Response Below      Query Response:   Acute respiratory distress.  Electronically signed by Miguelangel Lowery MD, 10/19/22, 7:43 AM EDT.             If applicable, please update the problem list.         Patient: Craig Bolanos        : 1985  Account: [id number]           Admit Date: 10/14/2022        How to Respond to this query:       a. Click New Note     b. Answer query within the yellow box.                c. Update the Problem List, if applicable.      If you have any questions about this query contact me at: [email or web address]     Dr. Lowery,    36 yr old male admitted with status asthmaticus. H&P & discharge summary documents \"Patient in significant respiratory distress.\" Pulmonary progress note on 10/14 documents \"Respiratory failure with severe status asthmaticus.\" ER MD note documents \"On arrival emergency department, he is tripoding, is in moderate  to severe respiratory distress and is diaphoretic; Markedly increased work of breathing with diffuse wheezing throughout.\" H&P documents \"he sort of holding himself with both arms down. He is breathing about 30 times a minute. He is able to talk and say about 3-4 words before he gets dyspneic. He is on 3 to 5 L nasal cannula O2 saturation between 91 and 95% on that. He is pretty tight.  He is sort tripoding when he sits up.  He is using accessory muscles. He has wheezes in the upper lung fields.  Not a whole lot of air movement to the bases.\" ABGs on 6L showed pH 7.37, PCO2 41 and PO2 77. The patient has documented O2 @ 6L with sats 94-95% then O2 3L with sat 94% and back to O2 @ 5L with sats 95-96% and decreased to 4L with sats 95-97% and weaned to 1L and room air with sats >90%, respiratory rate 12-34 and heart rate 60-110s. The patient was treated Albuterol nebulizer x2, Duo-neb, Solu-Medrol 125mg IV then 60mg IV q8hrs x4 doses then 60mg q12hrs IV x 1 dose and discharge on Prednisone 40 mg daily for 6 more days.    Can the patient's " condition be further specified as:    - Acute hypoxic respiratory failure  - Acute respiratory distress   - Other (please specify) ________________  - Unable to determine    By submitting this query, we are merely seeking further clarification of documentation to accurately reflect all conditions that you are monitoring, evaluating, treating or that extend the hospitalization or utilize additional resources of care. Please utilize your independent clinical judgment when addressing the question(s) above.     This query and your response, once completed, will be entered into the legal medical record.    Sincerely,  Cassie Avila RN   Clinical Documentation Integrity Program

## 2022-11-13 ENCOUNTER — APPOINTMENT (OUTPATIENT)
Dept: GENERAL RADIOLOGY | Facility: HOSPITAL | Age: 37
End: 2022-11-13

## 2022-11-13 ENCOUNTER — HOSPITAL ENCOUNTER (OUTPATIENT)
Facility: HOSPITAL | Age: 37
Setting detail: OBSERVATION
Discharge: HOME | End: 2022-11-15
Attending: EMERGENCY MEDICINE | Admitting: HOSPITALIST

## 2022-11-13 DIAGNOSIS — J96.01 ACUTE HYPOXEMIC RESPIRATORY FAILURE: ICD-10-CM

## 2022-11-13 DIAGNOSIS — J45.901 EXACERBATION OF ASTHMA, UNSPECIFIED ASTHMA SEVERITY, UNSPECIFIED WHETHER PERSISTENT: Primary | ICD-10-CM

## 2022-11-13 LAB
ALBUMIN SERPL-MCNC: 4.7 G/DL (ref 3.5–5.2)
ALBUMIN/GLOB SERPL: 1.7 G/DL
ALP SERPL-CCNC: 65 U/L (ref 39–117)
ALT SERPL W P-5'-P-CCNC: 35 U/L (ref 1–41)
ANION GAP SERPL CALCULATED.3IONS-SCNC: 14.2 MMOL/L (ref 5–15)
ARTERIAL PATENCY WRIST A: POSITIVE
AST SERPL-CCNC: 29 U/L (ref 1–40)
ATMOSPHERIC PRESS: 761.5 MMHG
B PARAPERT DNA SPEC QL NAA+PROBE: NOT DETECTED
B PERT DNA SPEC QL NAA+PROBE: NOT DETECTED
BASE EXCESS BLDA CALC-SCNC: -2 MMOL/L (ref 0–2)
BASOPHILS # BLD AUTO: 0.05 10*3/MM3 (ref 0–0.2)
BASOPHILS NFR BLD AUTO: 0.6 % (ref 0–1.5)
BDY SITE: ABNORMAL
BILIRUB SERPL-MCNC: <0.2 MG/DL (ref 0–1.2)
BUN SERPL-MCNC: 9 MG/DL (ref 6–20)
BUN/CREAT SERPL: 8.3 (ref 7–25)
C PNEUM DNA NPH QL NAA+NON-PROBE: NOT DETECTED
CALCIUM SPEC-SCNC: 9.4 MG/DL (ref 8.6–10.5)
CHLORIDE SERPL-SCNC: 108 MMOL/L (ref 98–107)
CO2 SERPL-SCNC: 21.8 MMOL/L (ref 22–29)
CREAT SERPL-MCNC: 1.08 MG/DL (ref 0.76–1.27)
DEPRECATED RDW RBC AUTO: 43.4 FL (ref 37–54)
EGFRCR SERPLBLD CKD-EPI 2021: 91.8 ML/MIN/1.73
EOSINOPHIL # BLD AUTO: 0.45 10*3/MM3 (ref 0–0.4)
EOSINOPHIL NFR BLD AUTO: 5.2 % (ref 0.3–6.2)
ERYTHROCYTE [DISTWIDTH] IN BLOOD BY AUTOMATED COUNT: 14.4 % (ref 12.3–15.4)
FLUAV SUBTYP SPEC NAA+PROBE: NOT DETECTED
FLUBV RNA ISLT QL NAA+PROBE: NOT DETECTED
GAS FLOW AIRWAY: 2 LPM
GLOBULIN UR ELPH-MCNC: 2.8 GM/DL
GLUCOSE SERPL-MCNC: 92 MG/DL (ref 65–99)
HADV DNA SPEC NAA+PROBE: NOT DETECTED
HCO3 BLDA-SCNC: 23.5 MMOL/L (ref 22–28)
HCOV 229E RNA SPEC QL NAA+PROBE: NOT DETECTED
HCOV HKU1 RNA SPEC QL NAA+PROBE: NOT DETECTED
HCOV NL63 RNA SPEC QL NAA+PROBE: NOT DETECTED
HCOV OC43 RNA SPEC QL NAA+PROBE: NOT DETECTED
HCT VFR BLD AUTO: 47.7 % (ref 37.5–51)
HGB BLD-MCNC: 16.1 G/DL (ref 13–17.7)
HMPV RNA NPH QL NAA+NON-PROBE: NOT DETECTED
HPIV1 RNA ISLT QL NAA+PROBE: NOT DETECTED
HPIV2 RNA SPEC QL NAA+PROBE: NOT DETECTED
HPIV3 RNA NPH QL NAA+PROBE: NOT DETECTED
HPIV4 P GENE NPH QL NAA+PROBE: NOT DETECTED
IMM GRANULOCYTES # BLD AUTO: 0.04 10*3/MM3 (ref 0–0.05)
IMM GRANULOCYTES NFR BLD AUTO: 0.5 % (ref 0–0.5)
LYMPHOCYTES # BLD AUTO: 2.52 10*3/MM3 (ref 0.7–3.1)
LYMPHOCYTES NFR BLD AUTO: 29.3 % (ref 19.6–45.3)
M PNEUMO IGG SER IA-ACNC: NOT DETECTED
MCH RBC QN AUTO: 28 PG (ref 26.6–33)
MCHC RBC AUTO-ENTMCNC: 33.8 G/DL (ref 31.5–35.7)
MCV RBC AUTO: 83.1 FL (ref 79–97)
MODALITY: ABNORMAL
MONOCYTES # BLD AUTO: 0.86 10*3/MM3 (ref 0.1–0.9)
MONOCYTES NFR BLD AUTO: 10 % (ref 5–12)
NEUTROPHILS NFR BLD AUTO: 4.68 10*3/MM3 (ref 1.7–7)
NEUTROPHILS NFR BLD AUTO: 54.4 % (ref 42.7–76)
NRBC BLD AUTO-RTO: 0 /100 WBC (ref 0–0.2)
NT-PROBNP SERPL-MCNC: 36.4 PG/ML (ref 0–450)
PCO2 BLDA: 41.9 MM HG (ref 35–45)
PH BLDA: 7.36 PH UNITS (ref 7.35–7.45)
PLATELET # BLD AUTO: 236 10*3/MM3 (ref 140–450)
PMV BLD AUTO: 11.4 FL (ref 6–12)
PO2 BLDA: 71.1 MM HG (ref 80–100)
POTASSIUM SERPL-SCNC: 4.2 MMOL/L (ref 3.5–5.2)
PROT SERPL-MCNC: 7.5 G/DL (ref 6–8.5)
RBC # BLD AUTO: 5.74 10*6/MM3 (ref 4.14–5.8)
RHINOVIRUS RNA SPEC NAA+PROBE: NOT DETECTED
RSV RNA NPH QL NAA+NON-PROBE: NOT DETECTED
SAO2 % BLDCOA: 93.3 % (ref 92–99)
SARS-COV-2 RNA NPH QL NAA+NON-PROBE: NOT DETECTED
SODIUM SERPL-SCNC: 144 MMOL/L (ref 136–145)
TOTAL RATE: 24 BREATHS/MINUTE
TROPONIN T SERPL-MCNC: <0.01 NG/ML (ref 0–0.03)
WBC NRBC COR # BLD: 8.6 10*3/MM3 (ref 3.4–10.8)

## 2022-11-13 PROCEDURE — 96375 TX/PRO/DX INJ NEW DRUG ADDON: CPT

## 2022-11-13 PROCEDURE — 93005 ELECTROCARDIOGRAM TRACING: CPT | Performed by: EMERGENCY MEDICINE

## 2022-11-13 PROCEDURE — 96365 THER/PROPH/DIAG IV INF INIT: CPT

## 2022-11-13 PROCEDURE — 71045 X-RAY EXAM CHEST 1 VIEW: CPT

## 2022-11-13 PROCEDURE — 83880 ASSAY OF NATRIURETIC PEPTIDE: CPT | Performed by: EMERGENCY MEDICINE

## 2022-11-13 PROCEDURE — 93010 ELECTROCARDIOGRAM REPORT: CPT | Performed by: INTERNAL MEDICINE

## 2022-11-13 PROCEDURE — 94640 AIRWAY INHALATION TREATMENT: CPT

## 2022-11-13 PROCEDURE — 36600 WITHDRAWAL OF ARTERIAL BLOOD: CPT

## 2022-11-13 PROCEDURE — 94799 UNLISTED PULMONARY SVC/PX: CPT

## 2022-11-13 PROCEDURE — 85025 COMPLETE CBC W/AUTO DIFF WBC: CPT | Performed by: EMERGENCY MEDICINE

## 2022-11-13 PROCEDURE — 84484 ASSAY OF TROPONIN QUANT: CPT | Performed by: EMERGENCY MEDICINE

## 2022-11-13 PROCEDURE — 94761 N-INVAS EAR/PLS OXIMETRY MLT: CPT

## 2022-11-13 PROCEDURE — 80053 COMPREHEN METABOLIC PANEL: CPT | Performed by: EMERGENCY MEDICINE

## 2022-11-13 PROCEDURE — 25010000002 DEXAMETHASONE PER 1 MG: Performed by: EMERGENCY MEDICINE

## 2022-11-13 PROCEDURE — 25010000002 MAGNESIUM SULFATE 2 GM/50ML SOLUTION: Performed by: EMERGENCY MEDICINE

## 2022-11-13 PROCEDURE — 99285 EMERGENCY DEPT VISIT HI MDM: CPT

## 2022-11-13 PROCEDURE — 0202U NFCT DS 22 TRGT SARS-COV-2: CPT | Performed by: EMERGENCY MEDICINE

## 2022-11-13 PROCEDURE — 82803 BLOOD GASES ANY COMBINATION: CPT

## 2022-11-13 RX ORDER — IPRATROPIUM BROMIDE AND ALBUTEROL SULFATE 2.5; .5 MG/3ML; MG/3ML
3 SOLUTION RESPIRATORY (INHALATION) ONCE
Status: COMPLETED | OUTPATIENT
Start: 2022-11-13 | End: 2022-11-14

## 2022-11-13 RX ORDER — MAGNESIUM SULFATE HEPTAHYDRATE 40 MG/ML
2 INJECTION, SOLUTION INTRAVENOUS ONCE
Status: COMPLETED | OUTPATIENT
Start: 2022-11-13 | End: 2022-11-13

## 2022-11-13 RX ORDER — SODIUM CHLORIDE 0.9 % (FLUSH) 0.9 %
10 SYRINGE (ML) INJECTION AS NEEDED
Status: DISCONTINUED | OUTPATIENT
Start: 2022-11-13 | End: 2022-11-15 | Stop reason: HOSPADM

## 2022-11-13 RX ORDER — DEXAMETHASONE SODIUM PHOSPHATE 10 MG/ML
6 INJECTION INTRAMUSCULAR; INTRAVENOUS ONCE
Status: COMPLETED | OUTPATIENT
Start: 2022-11-13 | End: 2022-11-13

## 2022-11-13 RX ORDER — IPRATROPIUM BROMIDE AND ALBUTEROL SULFATE 2.5; .5 MG/3ML; MG/3ML
3 SOLUTION RESPIRATORY (INHALATION) ONCE
Status: COMPLETED | OUTPATIENT
Start: 2022-11-13 | End: 2022-11-13

## 2022-11-13 RX ORDER — ALBUTEROL SULFATE 2.5 MG/3ML
2.5 SOLUTION RESPIRATORY (INHALATION) ONCE
Status: COMPLETED | OUTPATIENT
Start: 2022-11-13 | End: 2022-11-13

## 2022-11-13 RX ADMIN — IPRATROPIUM BROMIDE AND ALBUTEROL SULFATE 3 ML: 2.5; .5 SOLUTION RESPIRATORY (INHALATION) at 21:56

## 2022-11-13 RX ADMIN — MAGNESIUM SULFATE HEPTAHYDRATE 2 G: 2 INJECTION, SOLUTION INTRAVENOUS at 21:30

## 2022-11-13 RX ADMIN — ALBUTEROL SULFATE 2.5 MG: 2.5 SOLUTION RESPIRATORY (INHALATION) at 22:18

## 2022-11-13 RX ADMIN — DEXAMETHASONE SODIUM PHOSPHATE 6 MG: 10 INJECTION INTRAMUSCULAR; INTRAVENOUS at 21:30

## 2022-11-14 PROBLEM — J45.901 EXACERBATION OF ASTHMA, UNSPECIFIED ASTHMA SEVERITY, UNSPECIFIED WHETHER PERSISTENT: Status: ACTIVE | Noted: 2022-11-14

## 2022-11-14 PROBLEM — J44.9 COPD (CHRONIC OBSTRUCTIVE PULMONARY DISEASE): Status: ACTIVE | Noted: 2017-09-17

## 2022-11-14 LAB
AMPHET+METHAMPHET UR QL: NEGATIVE
BARBITURATES UR QL SCN: NEGATIVE
BENZODIAZ UR QL SCN: NEGATIVE
CANNABINOIDS SERPL QL: POSITIVE
COCAINE UR QL: NEGATIVE
D DIMER PPP FEU-MCNC: <0.27 MCGFEU/ML (ref 0–0.49)
METHADONE UR QL SCN: NEGATIVE
OPIATES UR QL: NEGATIVE
OXYCODONE UR QL SCN: NEGATIVE
PROCALCITONIN SERPL-MCNC: 0.02 NG/ML (ref 0–0.25)
QT INTERVAL: 342 MS

## 2022-11-14 PROCEDURE — 80307 DRUG TEST PRSMV CHEM ANLYZR: CPT | Performed by: HOSPITALIST

## 2022-11-14 PROCEDURE — 94799 UNLISTED PULMONARY SVC/PX: CPT

## 2022-11-14 PROCEDURE — 85379 FIBRIN DEGRADATION QUANT: CPT | Performed by: NURSE PRACTITIONER

## 2022-11-14 PROCEDURE — 25010000002 METHYLPREDNISOLONE PER 125 MG: Performed by: NURSE PRACTITIONER

## 2022-11-14 PROCEDURE — 94664 DEMO&/EVAL PT USE INHALER: CPT

## 2022-11-14 PROCEDURE — G0378 HOSPITAL OBSERVATION PER HR: HCPCS

## 2022-11-14 PROCEDURE — 96375 TX/PRO/DX INJ NEW DRUG ADDON: CPT

## 2022-11-14 PROCEDURE — 84145 PROCALCITONIN (PCT): CPT | Performed by: HOSPITALIST

## 2022-11-14 PROCEDURE — 96376 TX/PRO/DX INJ SAME DRUG ADON: CPT

## 2022-11-14 PROCEDURE — 94761 N-INVAS EAR/PLS OXIMETRY MLT: CPT

## 2022-11-14 RX ORDER — CHOLECALCIFEROL (VITAMIN D3) 125 MCG
5 CAPSULE ORAL NIGHTLY PRN
Status: DISCONTINUED | OUTPATIENT
Start: 2022-11-14 | End: 2022-11-15 | Stop reason: HOSPADM

## 2022-11-14 RX ORDER — ONDANSETRON 4 MG/1
4 TABLET, FILM COATED ORAL EVERY 6 HOURS PRN
Status: DISCONTINUED | OUTPATIENT
Start: 2022-11-14 | End: 2022-11-15 | Stop reason: HOSPADM

## 2022-11-14 RX ORDER — ACETAMINOPHEN 325 MG/1
650 TABLET ORAL EVERY 4 HOURS PRN
Status: DISCONTINUED | OUTPATIENT
Start: 2022-11-14 | End: 2022-11-15 | Stop reason: HOSPADM

## 2022-11-14 RX ORDER — ENOXAPARIN SODIUM 100 MG/ML
40 INJECTION SUBCUTANEOUS DAILY
Status: DISCONTINUED | OUTPATIENT
Start: 2022-11-14 | End: 2022-11-15 | Stop reason: HOSPADM

## 2022-11-14 RX ORDER — NITROGLYCERIN 0.4 MG/1
0.4 TABLET SUBLINGUAL
Status: DISCONTINUED | OUTPATIENT
Start: 2022-11-14 | End: 2022-11-15 | Stop reason: HOSPADM

## 2022-11-14 RX ORDER — BUDESONIDE AND FORMOTEROL FUMARATE DIHYDRATE 160; 4.5 UG/1; UG/1
2 AEROSOL RESPIRATORY (INHALATION)
Status: DISCONTINUED | OUTPATIENT
Start: 2022-11-14 | End: 2022-11-15 | Stop reason: HOSPADM

## 2022-11-14 RX ORDER — ONDANSETRON 2 MG/ML
4 INJECTION INTRAMUSCULAR; INTRAVENOUS EVERY 6 HOURS PRN
Status: DISCONTINUED | OUTPATIENT
Start: 2022-11-14 | End: 2022-11-15 | Stop reason: HOSPADM

## 2022-11-14 RX ORDER — ALUMINA, MAGNESIA, AND SIMETHICONE 2400; 2400; 240 MG/30ML; MG/30ML; MG/30ML
15 SUSPENSION ORAL EVERY 6 HOURS PRN
Status: DISCONTINUED | OUTPATIENT
Start: 2022-11-14 | End: 2022-11-15 | Stop reason: HOSPADM

## 2022-11-14 RX ORDER — MONTELUKAST SODIUM 10 MG/1
10 TABLET ORAL NIGHTLY
Status: DISCONTINUED | OUTPATIENT
Start: 2022-11-14 | End: 2022-11-15 | Stop reason: HOSPADM

## 2022-11-14 RX ORDER — IPRATROPIUM BROMIDE AND ALBUTEROL SULFATE 2.5; .5 MG/3ML; MG/3ML
3 SOLUTION RESPIRATORY (INHALATION)
Status: DISCONTINUED | OUTPATIENT
Start: 2022-11-14 | End: 2022-11-15 | Stop reason: HOSPADM

## 2022-11-14 RX ORDER — METHYLPREDNISOLONE SODIUM SUCCINATE 125 MG/2ML
60 INJECTION, POWDER, LYOPHILIZED, FOR SOLUTION INTRAMUSCULAR; INTRAVENOUS EVERY 6 HOURS SCHEDULED
Status: DISCONTINUED | OUTPATIENT
Start: 2022-11-14 | End: 2022-11-15

## 2022-11-14 RX ORDER — SODIUM CHLORIDE 0.9 % (FLUSH) 0.9 %
10 SYRINGE (ML) INJECTION AS NEEDED
Status: DISCONTINUED | OUTPATIENT
Start: 2022-11-14 | End: 2022-11-15 | Stop reason: HOSPADM

## 2022-11-14 RX ADMIN — METHYLPREDNISOLONE SODIUM SUCCINATE 60 MG: 125 INJECTION, POWDER, FOR SOLUTION INTRAMUSCULAR; INTRAVENOUS at 23:04

## 2022-11-14 RX ADMIN — IPRATROPIUM BROMIDE AND ALBUTEROL SULFATE 3 ML: 2.5; .5 SOLUTION RESPIRATORY (INHALATION) at 12:46

## 2022-11-14 RX ADMIN — ACETAMINOPHEN 650 MG: 325 TABLET, FILM COATED ORAL at 22:39

## 2022-11-14 RX ADMIN — MONTELUKAST SODIUM 10 MG: 10 TABLET, FILM COATED ORAL at 22:39

## 2022-11-14 RX ADMIN — IPRATROPIUM BROMIDE AND ALBUTEROL SULFATE 3 ML: 2.5; .5 SOLUTION RESPIRATORY (INHALATION) at 08:57

## 2022-11-14 RX ADMIN — BUDESONIDE AND FORMOTEROL FUMARATE DIHYDRATE 2 PUFF: 160; 4.5 AEROSOL RESPIRATORY (INHALATION) at 20:08

## 2022-11-14 RX ADMIN — IPRATROPIUM BROMIDE AND ALBUTEROL SULFATE 3 ML: 2.5; .5 SOLUTION RESPIRATORY (INHALATION) at 03:32

## 2022-11-14 RX ADMIN — METHYLPREDNISOLONE SODIUM SUCCINATE 60 MG: 125 INJECTION, POWDER, FOR SOLUTION INTRAMUSCULAR; INTRAVENOUS at 18:21

## 2022-11-14 RX ADMIN — IPRATROPIUM BROMIDE AND ALBUTEROL SULFATE 3 ML: 2.5; .5 SOLUTION RESPIRATORY (INHALATION) at 23:32

## 2022-11-14 RX ADMIN — METHYLPREDNISOLONE SODIUM SUCCINATE 60 MG: 125 INJECTION, POWDER, FOR SOLUTION INTRAMUSCULAR; INTRAVENOUS at 12:01

## 2022-11-14 RX ADMIN — IPRATROPIUM BROMIDE AND ALBUTEROL SULFATE 3 ML: 2.5; .5 SOLUTION RESPIRATORY (INHALATION) at 15:16

## 2022-11-14 RX ADMIN — METHYLPREDNISOLONE SODIUM SUCCINATE 60 MG: 125 INJECTION, POWDER, FOR SOLUTION INTRAMUSCULAR; INTRAVENOUS at 03:06

## 2022-11-14 RX ADMIN — IPRATROPIUM BROMIDE AND ALBUTEROL SULFATE 3 ML: 2.5; .5 SOLUTION RESPIRATORY (INHALATION) at 00:21

## 2022-11-14 NOTE — ED NOTES
Pt to ED via PV. Pt reports SOA since yesterday. Reports hx COPD, feels like this is a COPD exacerbation per patient. Denies CP. PT diaphoretic. Sat 94% on room air.

## 2022-11-14 NOTE — ED PROVIDER NOTES
EMERGENCY DEPARTMENT ENCOUNTER    Room Number:    Date of encounter:  2022  PCP: Provider, No Known  Historian: Patient      HPI:  Chief Complaint: Shortness of breath  A complete HPI/ROS/PMH/PSH/SH/FH are unobtainable due to: None    Context: Craig Bolanos is a 36 y.o. male who presents to the ED c/o shortness of breath.  Onset yesterday.  Symptoms are constant and progressively worsened.  They are severe.  He reports having nonproductive cough.  No fever or chest pain.  He has a history of asthma and COPD.  He is a former smoker.      PAST MEDICAL HISTORY  Active Ambulatory Problems     Diagnosis Date Noted   • Severe persistent asthma with status asthmaticus 2017   • COPD exacerbation (HCC) 2017   • Benign essential HTN 2017   • Hilar lymphadenopathy 2017   • Severe persistent asthma with exacerbation 10/14/2022     Resolved Ambulatory Problems     Diagnosis Date Noted   • Acute respiratory failure with hypoxia (HCC) 2017     Past Medical History:   Diagnosis Date   • Asthma    • COPD (chronic obstructive pulmonary disease) ([facility])    • Hypertension    • Migraine          PAST SURGICAL HISTORY  History reviewed. No pertinent surgical history.      FAMILY HISTORY  Family History   Problem Relation Age of Onset   • Diabetes Mother          SOCIAL HISTORY  Social History     Socioeconomic History   • Marital status: Single   Tobacco Use   • Smoking status: Former     Packs/day: 1.00     Years: 10.00     Pack years: 10.00     Types: Cigarettes     Quit date: 2013     Years since quittin.4   • Smokeless tobacco: Never   Substance and Sexual Activity   • Alcohol use: No   • Drug use: Yes     Types: Marijuana   • Sexual activity: Defer         ALLERGIES  Patient has no known allergies.        REVIEW OF SYSTEMS  Review of Systems     All systems reviewed and negative except for those discussed in HPI.       PHYSICAL EXAM    I have reviewed the triage vital signs and nursing  notes.    ED Triage Vitals [11/13/22 2103]   Temp Heart Rate Resp BP SpO2   99.5 °F (37.5 °C) 118 25 -- (!) 89 %      Temp src Heart Rate Source Patient Position BP Location FiO2 (%)   Tympanic -- -- -- --       Physical Exam  GENERAL: distressed  HENT: nares patent  EYES: no scleral icterus  CV: regular rhythm, tachycardic  RESPIRATORY: Tachypneic, supraclavicular retractions, speaks in 2 word phrases  ABDOMEN: soft, nontender  MUSCULOSKELETAL: no deformity, no lower extremity edema or tenderness  NEURO: alert, moves all extremities, follows commands  SKIN: warm, dry        LAB RESULTS  Recent Results (from the past 24 hour(s))   Comprehensive Metabolic Panel    Collection Time: 11/13/22  9:41 PM    Specimen: Arm, Right; Blood   Result Value Ref Range    Glucose 92 65 - 99 mg/dL    BUN 9 6 - 20 mg/dL    Creatinine 1.08 0.76 - 1.27 mg/dL    Sodium 144 136 - 145 mmol/L    Potassium 4.2 3.5 - 5.2 mmol/L    Chloride 108 (H) 98 - 107 mmol/L    CO2 21.8 (L) 22.0 - 29.0 mmol/L    Calcium 9.4 8.6 - 10.5 mg/dL    Total Protein 7.5 6.0 - 8.5 g/dL    Albumin 4.70 3.50 - 5.20 g/dL    ALT (SGPT) 35 1 - 41 U/L    AST (SGOT) 29 1 - 40 U/L    Alkaline Phosphatase 65 39 - 117 U/L    Total Bilirubin <0.2 0.0 - 1.2 mg/dL    Globulin 2.8 gm/dL    A/G Ratio 1.7 g/dL    BUN/Creatinine Ratio 8.3 7.0 - 25.0    Anion Gap 14.2 5.0 - 15.0 mmol/L    eGFR 91.8 >60.0 mL/min/1.73   BNP    Collection Time: 11/13/22  9:41 PM    Specimen: Arm, Right; Blood   Result Value Ref Range    proBNP 36.4 0.0 - 450.0 pg/mL   Troponin    Collection Time: 11/13/22  9:41 PM    Specimen: Arm, Right; Blood   Result Value Ref Range    Troponin T <0.010 0.000 - 0.030 ng/mL   CBC Auto Differential    Collection Time: 11/13/22  9:41 PM    Specimen: Arm, Right; Blood   Result Value Ref Range    WBC 8.60 3.40 - 10.80 10*3/mm3    RBC 5.74 4.14 - 5.80 10*6/mm3    Hemoglobin 16.1 13.0 - 17.7 g/dL    Hematocrit 47.7 37.5 - 51.0 %    MCV 83.1 79.0 - 97.0 fL    MCH 28.0 26.6  - 33.0 pg    MCHC 33.8 31.5 - 35.7 g/dL    RDW 14.4 12.3 - 15.4 %    RDW-SD 43.4 37.0 - 54.0 fl    MPV 11.4 6.0 - 12.0 fL    Platelets 236 140 - 450 10*3/mm3    Neutrophil % 54.4 42.7 - 76.0 %    Lymphocyte % 29.3 19.6 - 45.3 %    Monocyte % 10.0 5.0 - 12.0 %    Eosinophil % 5.2 0.3 - 6.2 %    Basophil % 0.6 0.0 - 1.5 %    Immature Grans % 0.5 0.0 - 0.5 %    Neutrophils, Absolute 4.68 1.70 - 7.00 10*3/mm3    Lymphocytes, Absolute 2.52 0.70 - 3.10 10*3/mm3    Monocytes, Absolute 0.86 0.10 - 0.90 10*3/mm3    Eosinophils, Absolute 0.45 (H) 0.00 - 0.40 10*3/mm3    Basophils, Absolute 0.05 0.00 - 0.20 10*3/mm3    Immature Grans, Absolute 0.04 0.00 - 0.05 10*3/mm3    nRBC 0.0 0.0 - 0.2 /100 WBC   Respiratory Panel PCR w/COVID-19(SARS-CoV-2) PAMELA/ROSALINDA/JASON/PAD/COR/MAD/VALERIA In-House, NP Swab in [place], 3-4 HR TAT - Swab, Nasopharynx    Collection Time: 11/13/22  9:42 PM    Specimen: Nasopharynx; Swab   Result Value Ref Range    ADENOVIRUS, PCR Not Detected Not Detected    Coronavirus 229E Not Detected Not Detected    Coronavirus HKU1 Not Detected Not Detected    Coronavirus NL63 Not Detected Not Detected    Coronavirus OC43 Not Detected Not Detected    COVID19 Not Detected Not Detected - Ref. Range    Human Metapneumovirus Not Detected Not Detected    Human Rhinovirus/Enterovirus Not Detected Not Detected    Influenza A PCR Not Detected Not Detected    Influenza B PCR Not Detected Not Detected    Parainfluenza Virus 1 Not Detected Not Detected    Parainfluenza Virus 2 Not Detected Not Detected    Parainfluenza Virus 3 Not Detected Not Detected    Parainfluenza Virus 4 Not Detected Not Detected    RSV, PCR Not Detected Not Detected    Bordetella pertussis pcr Not Detected Not Detected    Bordetella parapertussis PCR Not Detected Not Detected    Chlamydophila pneumoniae PCR Not Detected Not Detected    Mycoplasma pneumo by PCR Not Detected Not Detected   Blood Gas, Arterial -    Collection Time: 11/13/22 10:16 PM    Specimen:  Arterial Blood   Result Value Ref Range    Site Arterial: right radial     Donnell's Test Positive     pH, Arterial 7.357 7.350 - 7.450 pH units    pCO2, Arterial 41.9 35.0 - 45.0 mm Hg    pO2, Arterial 71.1 (L) 80.0 - 100.0 mm Hg    HCO3, Arterial 23.5 22.0 - 28.0 mmol/L    Base Excess, Arterial -2.0 (L) 0.0 - 2.0 mmol/L    O2 Saturation Calculated 93.3 92.0 - 99.0 %    Barometric Pressure for Blood Gas 761.5 mmHg    Modality Cannula     Flow Rate 2 lpm    Rate 24 Breaths/minute   ECG 12 Lead Dyspnea    Collection Time: 11/13/22 10:35 PM   Result Value Ref Range    QT Interval 342 ms       Ordered the above labs and independently reviewed the results.        RADIOLOGY  XR Chest 1 View    Result Date: 11/14/2022  SINGLE VIEW OF THE CHEST  HISTORY: Shortness of breath and cough  COMPARISON: 10/14/2022  FINDINGS: Heart size is within normal limits. No pneumothorax or pleural effusion is seen. Patient does appear to have some background changes of COPD. There is some prominence of the perihilar interstitium, and superimposed viral pneumonia or reactive airways disease is not excluded.      The patient does have background changes of COPD, but there is some increased prominence of the perihilar interstitium, which may represent superimposed viral pneumonia or reactive airways disease.  This report was finalized on 11/14/2022 12:03 AM by Dr. Mary Blake M.D.        I ordered the above noted radiological studies. Reviewed by me and discussed with radiologist.  See dictation for official radiology interpretation.      PROCEDURES    Critical Care  Performed by: Nabor Pearson II, MD  Authorized by: Nabor Pearson II, MD     Critical care provider statement:     Critical care time (minutes):  35    Critical care was necessary to treat or prevent imminent or life-threatening deterioration of the following conditions:  Respiratory failure    Critical care was time spent personally by me on the following  activities:  Ordering and performing treatments and interventions, ordering and review of laboratory studies, ordering and review of radiographic studies, pulse oximetry, re-evaluation of patient's condition, discussions with consultants, development of treatment plan with patient or surrogate, evaluation of patient's response to treatment and examination of patient          MEDICATIONS GIVEN IN ER    Medications   sodium chloride 0.9 % flush 10 mL (has no administration in time range)   ipratropium-albuterol (DUO-NEB) nebulizer solution 3 mL (has no administration in time range)   ipratropium-albuterol (DUO-NEB) nebulizer solution 3 mL (3 mL Nebulization Given 11/13/22 2156)   dexamethasone (DECADRON) injection 6 mg (6 mg Intravenous Given 11/13/22 2130)   magnesium sulfate 2g/50 mL (PREMIX) infusion (0 g Intravenous Stopped 11/13/22 2300)   albuterol (PROVENTIL) nebulizer solution 0.083% 2.5 mg/3mL (2.5 mg Nebulization Given 11/13/22 2218)         PROGRESS, DATA ANALYSIS, CONSULTS, AND MEDICAL DECISION MAKING    All labs have been independently reviewed by me.  All radiology studies have been reviewed by me and discussed with radiologist dictating the report.   EKG's independently viewed and interpreted by me.  Discussion below represents my analysis of pertinent findings related to patient's condition, differential diagnosis, treatment plan and final disposition.     Differential diagnosis includes but not limited to CHF, acute coronary syndrome, pulmonary embolism, pneumothorax, pneumonia, asthma/COPD, pulmonary hypertension, deconditioning, anemia, other hematologic etiologies such as CO poisoning, anxiety.         ED Course as of 11/14/22 0011   Sun Nov 13, 2022 2110 SpO2(!): 85 % [TD]   2110 Device (Oxygen Therapy): room air [TD]   2238 EKG interpreted by myself.  Time 10:35 PM.  Sinus rhythm.  Heart rate 100.  Normal intervals and axis.  Prolonged R wave progression.  Left atrial abnormality. [TD]   2250  pH, Arterial: 7.357 [TD]   2250 pCO2, Arterial: 41.9 [TD]   2250 COVID19: Not Detected [TD]      ED Course User Index  [TD] Nabor Pearson II, MD     On repeat evaluation of the patient, he appears much better.  He still has quite tight but he is moving air.  He is not able to speak in long phrases to me.  However, I do believe that he would benefit from admission.    I discussed the case with [facility].  Admit to Dr. Nichols.  We reviewed patient's labs, imaging, ER therapy.    PPE: The patient wore a surgical mask throughout the entire patient encounter. I wore an N95.    AS OF 00:11 EST VITALS:    BP - (!) 157/110  HR - 83  TEMP - 98.9 °F (37.2 °C)  O2 SATS - 97%        DIAGNOSIS  Final diagnoses:   Exacerbation of asthma, unspecified asthma severity, unspecified whether persistent   Acute hypoxemic respiratory failure (HCC)         DISPOSITION  Admit           Nabor Pearson II, MD  11/14/22 0011       Nabor Pearson II, MD  11/14/22 0011

## 2022-11-14 NOTE — DISCHARGE PLACEMENT REQUEST
"Alex Bolanos (36 y.o. Male)     Date of Birth   1985    Social Security Number       Address   [address] [place] [id number]    Home Phone   [phone]    MRN   [id number]       Confucianist   None    Marital Status   Single                            Admission Date   11/13/22    Admission Type   Emergency    Admitting Provider   Royal Nichols MD    Attending Provider   Roman Nash MD    Department, Room/Bed   [facility] OBSERVATION, 126/1       Discharge Date       Discharge Disposition       Discharge Destination                               Attending Provider: Roman Nash MD    Allergies: No Known Allergies    Isolation: None   Infection: None   Code Status: CPR    Ht: 177.8 cm (70\")   Wt: 64.5 kg (142 lb 3 oz)    Admission Cmt: None   Principal Problem: Severe persistent asthma with exacerbation [J45.51]                 Active Insurance as of 11/13/2022     Primary Coverage     Payor Plan Insurance Group Employer/Plan Group    [facility] BY DOR[employer] [facility] BY DORYS [id number]     Payor Plan Address Payor Plan Phone Number Payor Plan Fax Number Effective Dates    [address] [address]   10/1/2021 - None Entered    [facility] [id number]       Subscriber Name Subscriber Birth Date Member ID       ALEX BOLANOS 1985 [id number]                 Emergency Contacts      (Rel.) Home Phone Work Phone Mobile Phone    Ann Bolanos (Mother) [phone] -- --              "

## 2022-11-14 NOTE — PLAN OF CARE
Goal Outcome Evaluation:  PT's breathing has improved throughout the day.  Wheezing has diminished.  IV steroids given.  Patient has remained off oxygen throughout the day.  VSS.  Will continue to monitor.
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
## 2022-11-14 NOTE — H&P
Patient Name:  Craig Bolanos  YOB: 1985  MRN:  [id number]  Admit Date:  11/13/2022  Patient Care Team:  Provider, No Known as PCP - General      Subjective   History Present Illness     Chief Complaint   Patient presents with   • Shortness of Breath     History of Present Illness   Mr. Bolanos is a 36 y.o. former smoker with a history of asthma, COPD and HTN that presents to [facility] complaining of shortness of breath. The patient has an extensive history of asthma that is currently managed by Dr. Cordero with [facility]. He apparently has been intubated 9 times in the past related to this and recently admitted here about a month ago by pulmonology for status asthmaticus. He is supposed to be on Combivent as well as Dulera. He has been educated on the avoidance of smoking marijuana in the past and currently denies any tobacco abuse.   The patient states his current episode began last night when he had a sudden onset of dyspnea. He attempted to use his rescue inhaler with little relief. He reports a dry cough but no chest pain, fever or chills. He denies any current GI upset and is currently feeling more tired as he didn't sleep well last night d/t his breathing issues. He currently reports improvement in his breathing after receiving steroids and bronchodilators overnight.   In the ED, he was 89% on RA with . His initial BP was 194/120 with some improvement to 164/102 this morning. Lab work showed a normal WBC of 8.6, negative troponin and normal BNP. Renal function was normal and RVP negative. ABGs showed mild hypoxia but otherwise compensated. CXR showed increased prominence at perihilar interstitium felt to be superimposed viral pneumonia versus reactive airway disease. He was given steroids and is being admitted for further care at this time.    Review of Systems   Constitutional: Negative for activity change, appetite change and chills.   HENT: Negative for  congestion and ear pain.    Respiratory: Positive for cough, shortness of breath and wheezing.    Cardiovascular: Negative for chest pain and leg swelling.   Gastrointestinal: Negative for abdominal distention, abdominal pain, constipation, diarrhea, nausea and vomiting.   Genitourinary: Negative for difficulty urinating and dysuria.   Musculoskeletal: Negative for arthralgias and back pain.   Skin: Negative for color change and pallor.   Neurological: Negative for dizziness and light-headedness.   Psychiatric/Behavioral: Negative for confusion. The patient is not nervous/anxious.       Personal History     Past Medical History:   Diagnosis Date   • Asthma    • COPD (chronic obstructive pulmonary disease) (HCC)    • Hypertension    • Migraine      History reviewed. No pertinent surgical history.  Family History   Problem Relation Age of Onset   • Diabetes Mother      Social History     Tobacco Use   • Smoking status: Former     Packs/day: 1.00     Years: 10.00     Pack years: 10.00     Types: Cigarettes     Quit date: 2013     Years since quittin.4   • Smokeless tobacco: Never   Substance Use Topics   • Alcohol use: No   • Drug use: Yes     Types: Marijuana     Medications Prior to Admission   Medication Sig Dispense Refill Last Dose   • budesonide-formoterol (SYMBICORT) 160-4.5 MCG/ACT inhaler Inhale 2 puffs 2 (Two) Times a Day. 1 inhaler 0 Past Week   • cetirizine (zyrTEC) 10 MG tablet Take 10 mg by mouth Daily.   2022   • montelukast (SINGULAIR) 10 MG tablet Take 1 tablet by mouth Every Night. 30 tablet 0 2022     Allergies:  No Known Allergies    Objective    Objective     Vital Signs  Temp:  [97 °F (36.1 °C)-99.5 °F (37.5 °C)] 97 °F (36.1 °C)  Heart Rate:  [] 90  Resp:  [16-28] 18  BP: (137-194)/() 143/83  SpO2:  [85 %-97 %] 94 %  on  Flow (L/min):  [2-5] 2;   Device (Oxygen Therapy): room air  Body mass index is 20.4 kg/m².    Physical Exam  Vitals and nursing note reviewed.    Constitutional:       Appearance: He is not toxic-appearing.   HENT:      Head: Normocephalic and atraumatic.      Right Ear: External ear normal.      Left Ear: External ear normal.      Nose: Nose normal.   Cardiovascular:      Rate and Rhythm: Normal rate and regular rhythm.      Pulses: Normal pulses.   Pulmonary:      Effort: Pulmonary effort is normal. No respiratory distress.      Breath sounds: Wheezing (coarse/scattered) and rhonchi present.   Abdominal:      General: Bowel sounds are normal. There is no distension.      Palpations: Abdomen is soft.      Tenderness: There is no abdominal tenderness.   Musculoskeletal:         General: No swelling or tenderness. Normal range of motion.      Cervical back: Normal range of motion and neck supple.   Skin:     General: Skin is warm and dry.      Findings: No bruising.   Neurological:      Mental Status: He is alert and oriented to person, place, and time.      Sensory: No sensory deficit.      Coordination: Coordination normal.   Psychiatric:         Mood and Affect: Mood normal.         Behavior: Behavior normal.      Comments: Minimally interactive        Results Review:  I reviewed the patient's new clinical results.  I reviewed the patient's new imaging results and agree with the interpretation.  I reviewed the patient's other test results and agree with the interpretation  I personally viewed and interpreted the patient's EKG/Telemetry data    Lab Results (last 24 hours)     Procedure Component Value Units Date/Time    CBC & Differential [[id number]]  (Abnormal) Collected: 11/13/22 2141    Specimen: Blood from Arm, Right Updated: 11/13/22 2157    Narrative:      The following orders were created for panel order CBC & Differential.  Procedure                               Abnormality         Status                     ---------                               -----------         ------                     CBC Auto Differential[[id number]]        Abnormal             Final result                 Please view results for these tests on the individual orders.    Comprehensive Metabolic Panel [[id number]]  (Abnormal) Collected: 11/13/22 2141    Specimen: Blood from Arm, Right Updated: 11/13/22 2228     Glucose 92 mg/dL      BUN 9 mg/dL      Creatinine 1.08 mg/dL      Sodium 144 mmol/L      Potassium 4.2 mmol/L      Comment: Slight hemolysis detected by analyzer. Results may be affected.        Chloride 108 mmol/L      CO2 21.8 mmol/L      Calcium 9.4 mg/dL      Total Protein 7.5 g/dL      Albumin 4.70 g/dL      ALT (SGPT) 35 U/L      AST (SGOT) 29 U/L      Alkaline Phosphatase 65 U/L      Total Bilirubin <0.2 mg/dL      Globulin 2.8 gm/dL      A/G Ratio 1.7 g/dL      BUN/Creatinine Ratio 8.3     Anion Gap 14.2 mmol/L      eGFR 91.8 mL/min/1.73      Comment: National Kidney Foundation and American Society of Nephrology (ASN) Task Force recommended calculation based on the Chronic Kidney Disease Epidemiology Collaboration (CKD-EPI) equation refit without adjustment for race.       Narrative:      GFR Normal >60  Chronic Kidney Disease <60  Kidney Failure <15      BNP [[id number]]  (Normal) Collected: 11/13/22 2141    Specimen: Blood from Arm, Right Updated: 11/13/22 2217     proBNP 36.4 pg/mL     Narrative:      Among patients with dyspnea, NT-proBNP is highly sensitive for the detection of acute congestive heart failure. In addition NT-proBNP of <300 pg/ml effectively rules out acute congestive heart failure with 99% negative predictive value.    Results may be falsely decreased if patient taking Biotin.      Troponin [[id number]]  (Normal) Collected: 11/13/22 2141    Specimen: Blood from Arm, Right Updated: 11/13/22 2228     Troponin T <0.010 ng/mL     Narrative:      Troponin T Reference Range:  <= 0.03 ng/mL-   Negative for AMI  >0.03 ng/mL-     Abnormal for myocardial necrosis.  Clinicians would have to utilize clinical acumen, EKG, Troponin and serial changes to determine if  it is an Acute Myocardial Infarction or myocardial injury due to an underlying chronic condition.       Results may be falsely decreased if patient taking Biotin.      CBC Auto Differential [[id number]]  (Abnormal) Collected: 11/13/22 2141    Specimen: Blood from Arm, Right Updated: 11/13/22 2157     WBC 8.60 10*3/mm3      RBC 5.74 10*6/mm3      Hemoglobin 16.1 g/dL      Hematocrit 47.7 %      MCV 83.1 fL      MCH 28.0 pg      MCHC 33.8 g/dL      RDW 14.4 %      RDW-SD 43.4 fl      MPV 11.4 fL      Platelets 236 10*3/mm3      Neutrophil % 54.4 %      Lymphocyte % 29.3 %      Monocyte % 10.0 %      Eosinophil % 5.2 %      Basophil % 0.6 %      Immature Grans % 0.5 %      Neutrophils, Absolute 4.68 10*3/mm3      Lymphocytes, Absolute 2.52 10*3/mm3      Monocytes, Absolute 0.86 10*3/mm3      Eosinophils, Absolute 0.45 10*3/mm3      Basophils, Absolute 0.05 10*3/mm3      Immature Grans, Absolute 0.04 10*3/mm3      nRBC 0.0 /100 WBC     Respiratory Panel PCR w/COVID-19(SARS-CoV-2) PAMELA/ROSALINDA/JASON/PAD/COR/MAD/VALERIA In-House, NP Swab in [facility], 3-4 HR TAT - Swab, Nasopharynx [[id number]]  (Normal) Collected: 11/13/22 2142    Specimen: Swab from Nasopharynx Updated: 11/13/22 2247     ADENOVIRUS, PCR Not Detected     Coronavirus 229E Not Detected     Coronavirus HKU1 Not Detected     Coronavirus NL63 Not Detected     Coronavirus OC43 Not Detected     COVID19 Not Detected     Human Metapneumovirus Not Detected     Human Rhinovirus/Enterovirus Not Detected     Influenza A PCR Not Detected     Influenza B PCR Not Detected     Parainfluenza Virus 1 Not Detected     Parainfluenza Virus 2 Not Detected     Parainfluenza Virus 3 Not Detected     Parainfluenza Virus 4 Not Detected     RSV, PCR Not Detected     Bordetella pertussis pcr Not Detected     Bordetella parapertussis PCR Not Detected     Chlamydophila pneumoniae PCR Not Detected     Mycoplasma pneumo by PCR Not Detected    Narrative:      In the setting of a positive respiratory  panel with a viral infection PLUS a negative procalcitonin without other underlying concern for bacterial infection, consider observing off antibiotics or discontinuation of antibiotics and continue supportive care. If the respiratory panel is positive for atypical bacterial infection (Bordetella pertussis, Chlamydophila pneumoniae, or Mycoplasma pneumoniae), consider antibiotic de-escalation to target atypical bacterial infection.    Blood Gas, Arterial - [[id number]]  (Abnormal) Collected: 11/13/22 2216    Specimen: Arterial Blood Updated: 11/13/22 2220     Site Arterial: right radial     Donnell's Test Positive     pH, Arterial 7.357 pH units      pCO2, Arterial 41.9 mm Hg      pO2, Arterial 71.1 mm Hg      HCO3, Arterial 23.5 mmol/L      Base Excess, Arterial -2.0 mmol/L      O2 Saturation Calculated 93.3 %      Barometric Pressure for Blood Gas 761.5 mmHg      Comment: SATS 96% Meter: [id number] : [id number] Ross Garner        Modality Cannula     Flow Rate 2 lpm      Rate 24 Breaths/minute     D-dimer, Quantitative [[id number]]  (Normal) Collected: 11/14/22 0929    Specimen: Blood Updated: 11/14/22 1009     D-Dimer, Quantitative <0.27 MCGFEU/mL     Narrative:      The Stago D-Dimer test used in conjunction with a clinical pretest probability (PTP) assessment model, has been approved by the FDA to rule out the presence of venous thromboembolism (VTE) in outpatients suspected of deep venous thrombosis (DVT) or pulmonary embolism (PE). The cut-off for negative predictive value is <0.50 MCGFEU/mL.          Imaging Results (Last 24 Hours)     Procedure Component Value Units Date/Time    XR Chest 1 View [[id number]] Collected: 11/14/22 0000     Updated: 11/14/22 0006    Narrative:      SINGLE VIEW OF THE CHEST     HISTORY: Shortness of breath and cough     COMPARISON: 10/14/2022     FINDINGS:  Heart size is within normal limits. No pneumothorax or pleural effusion  is seen. Patient does appear to have some  background changes of COPD.  There is some prominence of the perihilar interstitium, and superimposed  viral pneumonia or reactive airways disease is not excluded.       Impression:      The patient does have background changes of COPD, but there is some  increased prominence of the perihilar interstitium, which may represent  superimposed viral pneumonia or reactive airways disease.     This report was finalized on 11/14/2022 12:03 AM by Dr. Mary Blake M.D.                 ECG 12 Lead Dyspnea   Preliminary Result   HEART RATE= 100  bpm   RR Interval= 600  ms   MN Interval= 147  ms   P Horizontal Axis= -47  deg   P Front Axis= 76  deg   QRSD Interval= 86  ms   QT Interval= 342  ms   QRS Axis= 70  deg   T Wave Axis= 39  deg   - BORDERLINE ECG -   Sinus tachycardia   Probable left atrial enlargement   Electronically Signed By:    Date and Time of Study: 2022-11-13 22:35:00           Assessment/Plan     Active Hospital Problems    Diagnosis  POA   • **Severe persistent asthma with exacerbation [J45.51]  Yes   • Hilar lymphadenopathy [R59.0]  Yes   • Benign essential HTN [I10]  Yes   • COPD (chronic obstructive pulmonary disease) (HCC) [J44.9]  Yes     Mr. Bolanos is a 36 year old male who presented to the hospital with dyspnea. He has known severe asthma followed by Dr. Cordero at [facility]. He was recently here about a month ago for status asthmaticus.    · Severe persistent asthma with exacerbation/COPD: No clinical evidence for infection at this time. RVP negative and without fever or leukocytosis to suggest pneumonia. Procal pending. Denies tobacco but has been smoking marijuana in the past. UDS is ordered but not obtained yet. Will monitor his oxygen requirements and wean as able. ABGs fairly benign. D-dimer was added this AM and negative. Continue scheduled nebulizer treatments as well as IV Solumedrol. Can taper steroids pending his course. Resume his home maintenance therapy. For any decline in  respiratory status, will ask pulmonology to see. Otherwise, follow up with Dr. Cordero at discharge.  · HTN: Markedly hypertensive on arrival that has since improved with treatment of his breathing. Last /83. Monitor trends and add medication as needed.    · I discussed the patients findings and my recommendations with patient, nursing staff and Dr. Nash.    VTE Prophylaxis - Lovenox 40 mg SC daily.  Code Status - Full code.       CATALINO Ulloa  [facility]  11/14/22  12:15 EST

## 2022-11-14 NOTE — ED NOTES
Nursing report ED to floor  Craig Bolanos  36 y.o.  male    HPI :   Chief Complaint   Patient presents with    Shortness of Breath       Admitting doctor:   Royal Nichols MD    Admitting diagnosis:   The primary encounter diagnosis was Exacerbation of asthma, unspecified asthma severity, unspecified whether persistent. A diagnosis of Acute hypoxemic respiratory failure (HCC) was also pertinent to this visit.    Code status:   Current Code Status       Date Active Code Status Order ID Comments User Context       Prior            Allergies:   Patient has no known allergies.    Isolation:   No active isolations    Intake and Output  No intake or output data in the 24 hours ending 11/14/22 0237    Weight:       11/13/22 2145   Weight: 62.6 kg (138 lb)       Most recent vitals:   Vitals:    11/13/22 2330 11/14/22 0021 11/14/22 0029 11/14/22 0101   BP: (!) 157/110   144/87   BP Location: Right arm      Patient Position: Lying      Pulse: 83 85 84 86   Resp: 16 16 16    Temp:       TempSrc:       SpO2: 97% 96% 97% 97%   Weight:       Height:           Active LDAs/IV Access:   Lines, Drains & Airways       Active LDAs       Name Placement date Placement time Site Days    Peripheral IV 11/13/22 2124 Anterior;Right Forearm 11/13/22 2124  Forearm  less than 1                    Labs (abnormal labs have a star):   Labs Reviewed   COMPREHENSIVE METABOLIC PANEL - Abnormal; Notable for the following components:       Result Value    Chloride 108 (*)     CO2 21.8 (*)     All other components within normal limits    Narrative:     GFR Normal >60  Chronic Kidney Disease <60  Kidney Failure <15     CBC WITH AUTO DIFFERENTIAL - Abnormal; Notable for the following components:    Eosinophils, Absolute 0.45 (*)     All other components within normal limits   BLOOD GAS, ARTERIAL - Abnormal; Notable for the following components:    pO2, Arterial 71.1 (*)     Base Excess, Arterial -2.0 (*)     All other components within normal limits    RESPIRATORY PANEL PCR W/ COVID-19 (SARS-COV-2) PAMELA/ROSALINDA/JASON/PAD/COR/MAD/VALERIA IN-HOUSE, NP SWAB IN UT/VTP, 3-4 HR TAT - Normal    Narrative:     In the setting of a positive respiratory panel with a viral infection PLUS a negative procalcitonin without other underlying concern for bacterial infection, consider observing off antibiotics or discontinuation of antibiotics and continue supportive care. If the respiratory panel is positive for atypical bacterial infection (Bordetella pertussis, Chlamydophila pneumoniae, or Mycoplasma pneumoniae), consider antibiotic de-escalation to target atypical bacterial infection.   BNP (IN-HOUSE) - Normal    Narrative:     Among patients with dyspnea, NT-proBNP is highly sensitive for the detection of acute congestive heart failure. In addition NT-proBNP of <300 pg/ml effectively rules out acute congestive heart failure with 99% negative predictive value.    Results may be falsely decreased if patient taking Biotin.     TROPONIN (IN-HOUSE) - Normal    Narrative:     Troponin T Reference Range:  <= 0.03 ng/mL-   Negative for AMI  >0.03 ng/mL-     Abnormal for myocardial necrosis.  Clinicians would have to utilize clinical acumen, EKG, Troponin and serial changes to determine if it is an Acute Myocardial Infarction or myocardial injury due to an underlying chronic condition.       Results may be falsely decreased if patient taking Biotin.     BLOOD GAS, ARTERIAL   CBC AND DIFFERENTIAL    Narrative:     The following orders were created for panel order CBC & Differential.  Procedure                               Abnormality         Status                     ---------                               -----------         ------                     CBC Auto Differential[[id number]]        Abnormal            Final result                 Please view results for these tests on the individual orders.       EKG:   ECG 12 Lead Dyspnea   Preliminary Result   HEART RATE= 100  bpm   RR Interval=  600  ms   MN Interval= 147  ms   P Horizontal Axis= -47  deg   P Front Axis= 76  deg   QRSD Interval= 86  ms   QT Interval= 342  ms   QRS Axis= 70  deg   T Wave Axis= 39  deg   - BORDERLINE ECG -   Sinus tachycardia   Probable left atrial enlargement   Electronically Signed By:    Date and Time of Study: 2022 22:35:00          Meds given in ED:   Medications   sodium chloride 0.9 % flush 10 mL (has no administration in time range)   ipratropium-albuterol (DUO-NEB) nebulizer solution 3 mL (3 mL Nebulization Given 22)   dexamethasone (DECADRON) injection 6 mg (6 mg Intravenous Given 22)   magnesium sulfate 2g/50 mL (PREMIX) infusion (0 g Intravenous Stopped 22)   albuterol (PROVENTIL) nebulizer solution 0.083% 2.5 mg/3mL (2.5 mg Nebulization Given 22)   ipratropium-albuterol (DUO-NEB) nebulizer solution 3 mL (3 mL Nebulization Given 22 002)       Imaging results:  XR Chest 1 View    Result Date: 2022  The patient does have background changes of COPD, but there is some increased prominence of the perihilar interstitium, which may represent superimposed viral pneumonia or reactive airways disease.  This report was finalized on 2022 12:03 AM by Dr. Mary Blake M.D.       Ambulatory status:   - independent    Social issues:   Social History     Socioeconomic History    Marital status: Single   Tobacco Use    Smoking status: Former     Packs/day: 1.00     Years: 10.00     Pack years: 10.00     Types: Cigarettes     Quit date: 2013     Years since quittin.4    Smokeless tobacco: Never   Substance and Sexual Activity    Alcohol use: No    Drug use: Yes     Types: Marijuana    Sexual activity: Defer       NIH Stroke Scale:         James Ferrer RN  22 02:37 EST

## 2022-11-15 VITALS
RESPIRATION RATE: 16 BRPM | WEIGHT: 142.19 LBS | TEMPERATURE: 98 F | OXYGEN SATURATION: 92 % | HEART RATE: 76 BPM | HEIGHT: 70 IN | BODY MASS INDEX: 20.36 KG/M2 | SYSTOLIC BLOOD PRESSURE: 128 MMHG | DIASTOLIC BLOOD PRESSURE: 83 MMHG

## 2022-11-15 LAB
ANION GAP SERPL CALCULATED.3IONS-SCNC: 13 MMOL/L (ref 5–15)
BUN SERPL-MCNC: 15 MG/DL (ref 6–20)
BUN/CREAT SERPL: 16.5 (ref 7–25)
CALCIUM SPEC-SCNC: 9.2 MG/DL (ref 8.6–10.5)
CHLORIDE SERPL-SCNC: 101 MMOL/L (ref 98–107)
CO2 SERPL-SCNC: 22 MMOL/L (ref 22–29)
CREAT SERPL-MCNC: 0.91 MG/DL (ref 0.76–1.27)
DEPRECATED RDW RBC AUTO: 43.4 FL (ref 37–54)
EGFRCR SERPLBLD CKD-EPI 2021: 112.7 ML/MIN/1.73
ERYTHROCYTE [DISTWIDTH] IN BLOOD BY AUTOMATED COUNT: 14.6 % (ref 12.3–15.4)
GLUCOSE BLDC GLUCOMTR-MCNC: 114 MG/DL (ref 70–130)
GLUCOSE SERPL-MCNC: 123 MG/DL (ref 65–99)
HCT VFR BLD AUTO: 44.3 % (ref 37.5–51)
HGB BLD-MCNC: 14.7 G/DL (ref 13–17.7)
MCH RBC QN AUTO: 27.7 PG (ref 26.6–33)
MCHC RBC AUTO-ENTMCNC: 33.2 G/DL (ref 31.5–35.7)
MCV RBC AUTO: 83.6 FL (ref 79–97)
PLATELET # BLD AUTO: 237 10*3/MM3 (ref 140–450)
PMV BLD AUTO: 11.4 FL (ref 6–12)
POTASSIUM SERPL-SCNC: 4.9 MMOL/L (ref 3.5–5.2)
RBC # BLD AUTO: 5.3 10*6/MM3 (ref 4.14–5.8)
SODIUM SERPL-SCNC: 136 MMOL/L (ref 136–145)
WBC NRBC COR # BLD: 15.04 10*3/MM3 (ref 3.4–10.8)

## 2022-11-15 PROCEDURE — 25010000002 METHYLPREDNISOLONE PER 125 MG: Performed by: NURSE PRACTITIONER

## 2022-11-15 PROCEDURE — 96376 TX/PRO/DX INJ SAME DRUG ADON: CPT

## 2022-11-15 PROCEDURE — 80048 BASIC METABOLIC PNL TOTAL CA: CPT | Performed by: NURSE PRACTITIONER

## 2022-11-15 PROCEDURE — 25010000002 ENOXAPARIN PER 10 MG: Performed by: NURSE PRACTITIONER

## 2022-11-15 PROCEDURE — 85027 COMPLETE CBC AUTOMATED: CPT | Performed by: NURSE PRACTITIONER

## 2022-11-15 PROCEDURE — G0378 HOSPITAL OBSERVATION PER HR: HCPCS

## 2022-11-15 PROCEDURE — 96372 THER/PROPH/DIAG INJ SC/IM: CPT

## 2022-11-15 PROCEDURE — 82962 GLUCOSE BLOOD TEST: CPT

## 2022-11-15 PROCEDURE — 94799 UNLISTED PULMONARY SVC/PX: CPT

## 2022-11-15 RX ORDER — PREDNISONE 20 MG/1
40 TABLET ORAL
Status: DISCONTINUED | OUTPATIENT
Start: 2022-11-15 | End: 2022-11-15

## 2022-11-15 RX ORDER — BUDESONIDE AND FORMOTEROL FUMARATE DIHYDRATE 160; 4.5 UG/1; UG/1
2 AEROSOL RESPIRATORY (INHALATION)
Qty: 1 EACH | Refills: 0 | Status: SHIPPED | OUTPATIENT
Start: 2022-11-15

## 2022-11-15 RX ORDER — PREDNISONE 20 MG/1
TABLET ORAL
Qty: 17 TABLET | Refills: 0 | OUTPATIENT
Start: 2022-11-15 | End: 2023-01-08

## 2022-11-15 RX ORDER — PREDNISONE 20 MG/1
40 TABLET ORAL 2 TIMES DAILY WITH MEALS
Status: DISCONTINUED | OUTPATIENT
Start: 2022-11-15 | End: 2022-11-15 | Stop reason: HOSPADM

## 2022-11-15 RX ADMIN — IPRATROPIUM BROMIDE AND ALBUTEROL SULFATE 3 ML: 2.5; .5 SOLUTION RESPIRATORY (INHALATION) at 02:57

## 2022-11-15 RX ADMIN — METHYLPREDNISOLONE SODIUM SUCCINATE 60 MG: 125 INJECTION, POWDER, FOR SOLUTION INTRAMUSCULAR; INTRAVENOUS at 05:08

## 2022-11-15 RX ADMIN — ENOXAPARIN SODIUM 40 MG: 100 INJECTION SUBCUTANEOUS at 08:50

## 2022-11-15 NOTE — PLAN OF CARE
Goal Outcome Evaluation:               Pt pleasant, AxOx4, here for copd/asthma. Pt has mild wheezes bilaterally. RT seeing for Duonebs/breathing treatments. Pt on RA throughout night, only one instance where sats hovered to 88%. Instructed pt to deep breathe ans sit higher in bed, sats resolved. Received IV solumedrol. CM involved for assist with discharge. Vitals stable and wnl.

## 2022-11-15 NOTE — DISCHARGE SUMMARY
PHYSICIAN DISCHARGE SUMMARY                                                                        [facility]    Patient Identification:  Name: Craig Bolanos  Age: 36 y.o.  Sex: male  :  1985  MRN: [id number]  Primary Care Physician: Provider, No Known    Admit date: 2022  Discharge date and time: 11/15/2022     Discharged Condition: good    Discharge Diagnoses:   Severe persistent asthma with acute exacerbation.  Cannabinoid abuse.  Elevated blood pressure on presentation.      Hospital Course:  Pleasant 36-year-old gentleman with a history of severe persistent asthma presents with increasing shortness of breath.  Please H&P for full details.  On presentation he had tight wheezing present.  No acute infiltrates no evidence of any underlying bacterial disease.  Respiratory viral panel was also negative.  He denies any tobacco use but continues with marijuana use.  Patient was admitted and treated aggressively with IV steroids and bronchodilators.  He responded very nicely.  He has not required oxygen during hospitalization.  This morning he is breathing much easier and feels pretty much back to his baseline.  He has been switched over to oral steroids and will have a tapering dose on discharge.  Discussed with the patient it appears he has not filled his Symbicort prescription in quite a while.  He states he does have Combivent at home however.  Rx for Symbicort was also provided in addition to his tapering steroids.      Consults:     Consults     Date and Time Order Name Status Description    2022 11:40 PM [facility] (on-call MD unless specified) Details              Discharge Exam:  Afebrile vital signs stable.  Well-developed well-nourished male no apparent distress.  Lungs with moderately coarse expiratory sounds bilaterally but not classic wheezes.  Moving air very nicely.  O2 sats mid to upper 90s on room air.  He is  breathing comfortably.  Heart regular rate and rhythm.  Extremities with no clubbing sinus edema.  Alert oriented cooperative pleasant.     Disposition:  Home    Patient Instructions:      Discharge Medications      New Medications      Instructions Start Date   predniSONE 20 MG tablet  Commonly known as: DELTASONE   2 pills twice a day for 2 days then 2 pills daily for 3 days then 1 pill daily for 3 days.         Continue These Medications      Instructions Start Date   budesonide-formoterol 160-4.5 MCG/ACT inhaler  Commonly known as: SYMBICORT   2 puffs, Inhalation, 2 Times Daily - RT      montelukast 10 MG tablet  Commonly known as: Singulair   10 mg, Oral, Nightly         Stop These Medications    cetirizine 10 MG tablet  Commonly known as: zyrTEC          Diet Instructions     Diet: Regular      Discharge Diet: Regular        No future appointments.  Additional Instructions for the Follow-ups that You Need to Schedule     Discharge Follow-up with PCP   As directed       Currently Documented PCP:    Provider, No Known    PCP Phone Number:    [phone]     Follow Up Details: 1 week.         Discharge Follow-up with Specialty: Pulmonology; 2 Weeks   As directed      Specialty: Pulmonology    Follow Up: 2 Weeks            Follow-up Information     Provider, No Known .    Why: 1 week.  Contact information:  [facility] [address] [phone]                       Discharge Order (From admission, onward)     Start     Ordered    11/15/22 1137  Discharge patient  Once        Expected Discharge Date: 11/15/22    Discharge Disposition: Home or Self Care    Physician of Record for Attribution - Please select from Treatment Team: SHANNAN WAGNER [[id number]]    Review needed by CMO to determine Physician of Record: No       Question Answer Comment   Physician of Record for Attribution - Please select from Treatment Team SHANNAN WAGNER    Review needed by CMO to determine Physician of Record No         11/15/22 1140                  Total time spent discharging patient including evaluation,post hospitalization follow up,  medication and post hospitalization instructions and education total time exceeds 30 minutes.    Signed:  Roman Nash MD  11/15/2022  11:42 EST    EMR Dragon/Transcription disclaimer:   Much of this encounter note is an electronic transcription/translation of spoken language to printed text. The electronic translation of spoken language may permit erroneous, or at times, nonsensical words or phrases to be inadvertently transcribed; Although I have reviewed the note for such errors, some may still exist.

## 2023-01-08 ENCOUNTER — HOSPITAL ENCOUNTER (EMERGENCY)
Facility: HOSPITAL | Age: 38
Discharge: HOME | End: 2023-01-08
Attending: EMERGENCY MEDICINE | Admitting: EMERGENCY MEDICINE
Payer: COMMERCIAL

## 2023-01-08 VITALS
HEART RATE: 79 BPM | SYSTOLIC BLOOD PRESSURE: 157 MMHG | OXYGEN SATURATION: 93 % | DIASTOLIC BLOOD PRESSURE: 117 MMHG | TEMPERATURE: 98.1 F | RESPIRATION RATE: 16 BRPM

## 2023-01-08 DIAGNOSIS — J45.901 MODERATE ASTHMA WITH ACUTE EXACERBATION, UNSPECIFIED WHETHER PERSISTENT: Primary | ICD-10-CM

## 2023-01-08 PROCEDURE — 94761 N-INVAS EAR/PLS OXIMETRY MLT: CPT

## 2023-01-08 PROCEDURE — 94760 N-INVAS EAR/PLS OXIMETRY 1: CPT

## 2023-01-08 PROCEDURE — 94799 UNLISTED PULMONARY SVC/PX: CPT

## 2023-01-08 PROCEDURE — 94640 AIRWAY INHALATION TREATMENT: CPT

## 2023-01-08 PROCEDURE — 63710000001 PREDNISONE PER 1 MG: Performed by: EMERGENCY MEDICINE

## 2023-01-08 PROCEDURE — 99284 EMERGENCY DEPT VISIT MOD MDM: CPT

## 2023-01-08 RX ORDER — ALBUTEROL SULFATE 2.5 MG/3ML
2.5 SOLUTION RESPIRATORY (INHALATION) EVERY 4 HOURS PRN
Qty: 120 EACH | Refills: 0 | Status: SHIPPED | OUTPATIENT
Start: 2023-01-08

## 2023-01-08 RX ORDER — PREDNISONE 20 MG/1
60 TABLET ORAL ONCE
Status: COMPLETED | OUTPATIENT
Start: 2023-01-08 | End: 2023-01-08

## 2023-01-08 RX ORDER — ALBUTEROL SULFATE 2.5 MG/3ML
2.5 SOLUTION RESPIRATORY (INHALATION)
Status: COMPLETED | OUTPATIENT
Start: 2023-01-08 | End: 2023-01-08

## 2023-01-08 RX ORDER — PREDNISONE 20 MG/1
40 TABLET ORAL DAILY
Qty: 10 TABLET | Refills: 0 | Status: SHIPPED | OUTPATIENT
Start: 2023-01-08 | End: 2023-01-13

## 2023-01-08 RX ADMIN — PREDNISONE 60 MG: 20 TABLET ORAL at 14:28

## 2023-01-08 RX ADMIN — ALBUTEROL SULFATE 2.5 MG: 2.5 SOLUTION RESPIRATORY (INHALATION) at 15:07

## 2023-01-08 RX ADMIN — ALBUTEROL SULFATE 2.5 MG: 2.5 SOLUTION RESPIRATORY (INHALATION) at 15:03

## 2023-01-08 RX ADMIN — ALBUTEROL SULFATE 2.5 MG: 2.5 SOLUTION RESPIRATORY (INHALATION) at 15:12
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
## 2023-01-08 NOTE — ED NOTES
Patient has asthma attack today, he had a treatment with EMS. Patient is feeling better now. Patient does not have a nebulizer at home. Patient had tightness when taking deep breaths. Patient has on mask nurse has on proper ppe

## 2023-05-04 ENCOUNTER — HOSPITAL ENCOUNTER (INPATIENT)
Facility: HOSPITAL | Age: 38
LOS: 2 days | Discharge: HOME | DRG: 189 | End: 2023-05-06
Attending: EMERGENCY MEDICINE | Admitting: INTERNAL MEDICINE
Payer: COMMERCIAL

## 2023-05-04 ENCOUNTER — APPOINTMENT (OUTPATIENT)
Dept: GENERAL RADIOLOGY | Facility: HOSPITAL | Age: 38
DRG: 189 | End: 2023-05-04
Payer: COMMERCIAL

## 2023-05-04 DIAGNOSIS — J45.51 SEVERE PERSISTENT ASTHMA WITH EXACERBATION: ICD-10-CM

## 2023-05-04 DIAGNOSIS — J96.01 ACUTE RESPIRATORY FAILURE WITH HYPOXIA: Primary | ICD-10-CM

## 2023-05-04 PROBLEM — J96.21 ACUTE ON CHRONIC RESPIRATORY FAILURE WITH HYPOXIA: Status: ACTIVE | Noted: 2023-05-04

## 2023-05-04 LAB
ANION GAP SERPL CALCULATED.3IONS-SCNC: 14.3 MMOL/L (ref 5–15)
B PARAPERT DNA SPEC QL NAA+PROBE: NOT DETECTED
B PERT DNA SPEC QL NAA+PROBE: NOT DETECTED
BUN SERPL-MCNC: 9 MG/DL (ref 6–20)
BUN/CREAT SERPL: 7.5 (ref 7–25)
C PNEUM DNA NPH QL NAA+NON-PROBE: NOT DETECTED
CALCIUM SPEC-SCNC: 10.6 MG/DL (ref 8.6–10.5)
CHLORIDE SERPL-SCNC: 105 MMOL/L (ref 98–107)
CO2 SERPL-SCNC: 20.7 MMOL/L (ref 22–29)
CREAT SERPL-MCNC: 1.2 MG/DL (ref 0.76–1.27)
D DIMER PPP FEU-MCNC: <0.27 MCGFEU/ML (ref 0–0.5)
DEPRECATED RDW RBC AUTO: 43.5 FL (ref 37–54)
EGFRCR SERPLBLD CKD-EPI 2021: 80.4 ML/MIN/1.73
ERYTHROCYTE [DISTWIDTH] IN BLOOD BY AUTOMATED COUNT: 14.2 % (ref 12.3–15.4)
FLUAV SUBTYP SPEC NAA+PROBE: NOT DETECTED
FLUBV RNA ISLT QL NAA+PROBE: NOT DETECTED
GLUCOSE SERPL-MCNC: 147 MG/DL (ref 65–99)
HADV DNA SPEC NAA+PROBE: NOT DETECTED
HCOV 229E RNA SPEC QL NAA+PROBE: NOT DETECTED
HCOV HKU1 RNA SPEC QL NAA+PROBE: NOT DETECTED
HCOV NL63 RNA SPEC QL NAA+PROBE: NOT DETECTED
HCOV OC43 RNA SPEC QL NAA+PROBE: NOT DETECTED
HCT VFR BLD AUTO: 49.8 % (ref 37.5–51)
HGB BLD-MCNC: 17.1 G/DL (ref 13–17.7)
HMPV RNA NPH QL NAA+NON-PROBE: NOT DETECTED
HPIV1 RNA ISLT QL NAA+PROBE: NOT DETECTED
HPIV2 RNA SPEC QL NAA+PROBE: NOT DETECTED
HPIV3 RNA NPH QL NAA+PROBE: NOT DETECTED
HPIV4 P GENE NPH QL NAA+PROBE: NOT DETECTED
LYMPHOCYTES # BLD MANUAL: 0.85 10*3/MM3 (ref 0.7–3.1)
LYMPHOCYTES NFR BLD MANUAL: 1 % (ref 5–12)
M PNEUMO IGG SER IA-ACNC: NOT DETECTED
MAGNESIUM SERPL-MCNC: 2 MG/DL (ref 1.6–2.6)
MCH RBC QN AUTO: 28.9 PG (ref 26.6–33)
MCHC RBC AUTO-ENTMCNC: 34.3 G/DL (ref 31.5–35.7)
MCV RBC AUTO: 84.3 FL (ref 79–97)
MONOCYTES # BLD: 0.09 10*3/MM3 (ref 0.1–0.9)
NEUTROPHILS # BLD AUTO: 7.57 10*3/MM3 (ref 1.7–7)
NEUTROPHILS NFR BLD MANUAL: 89 % (ref 42.7–76)
NRBC BLD AUTO-RTO: 0 /100 WBC (ref 0–0.2)
PLATELET # BLD AUTO: 248 10*3/MM3 (ref 140–450)
PMV BLD AUTO: 10.8 FL (ref 6–12)
POTASSIUM SERPL-SCNC: 4.2 MMOL/L (ref 3.5–5.2)
RBC # BLD AUTO: 5.91 10*6/MM3 (ref 4.14–5.8)
RBC MORPH BLD: NORMAL
RHINOVIRUS RNA SPEC NAA+PROBE: NOT DETECTED
RSV RNA NPH QL NAA+NON-PROBE: NOT DETECTED
SARS-COV-2 RNA NPH QL NAA+NON-PROBE: NOT DETECTED
SMALL PLATELETS BLD QL SMEAR: ADEQUATE
SODIUM SERPL-SCNC: 140 MMOL/L (ref 136–145)
VARIANT LYMPHS NFR BLD MANUAL: 10 % (ref 19.6–45.3)
WBC MORPH BLD: NORMAL
WBC NRBC COR # BLD: 8.51 10*3/MM3 (ref 3.4–10.8)

## 2023-05-04 PROCEDURE — 25010000002 METHYLPREDNISOLONE PER 125 MG: Performed by: EMERGENCY MEDICINE

## 2023-05-04 PROCEDURE — 25010000002 MAGNESIUM SULFATE 2 GM/50ML SOLUTION: Performed by: EMERGENCY MEDICINE

## 2023-05-04 PROCEDURE — 0202U NFCT DS 22 TRGT SARS-COV-2: CPT | Performed by: EMERGENCY MEDICINE

## 2023-05-04 PROCEDURE — 85025 COMPLETE CBC W/AUTO DIFF WBC: CPT | Performed by: EMERGENCY MEDICINE

## 2023-05-04 PROCEDURE — 85379 FIBRIN DEGRADATION QUANT: CPT | Performed by: EMERGENCY MEDICINE

## 2023-05-04 PROCEDURE — 94799 UNLISTED PULMONARY SVC/PX: CPT

## 2023-05-04 PROCEDURE — 94640 AIRWAY INHALATION TREATMENT: CPT

## 2023-05-04 PROCEDURE — 80048 BASIC METABOLIC PNL TOTAL CA: CPT | Performed by: EMERGENCY MEDICINE

## 2023-05-04 PROCEDURE — 94761 N-INVAS EAR/PLS OXIMETRY MLT: CPT

## 2023-05-04 PROCEDURE — 93005 ELECTROCARDIOGRAM TRACING: CPT

## 2023-05-04 PROCEDURE — 99285 EMERGENCY DEPT VISIT HI MDM: CPT

## 2023-05-04 PROCEDURE — 85007 BL SMEAR W/DIFF WBC COUNT: CPT | Performed by: EMERGENCY MEDICINE

## 2023-05-04 PROCEDURE — 71045 X-RAY EXAM CHEST 1 VIEW: CPT

## 2023-05-04 PROCEDURE — 83735 ASSAY OF MAGNESIUM: CPT | Performed by: EMERGENCY MEDICINE

## 2023-05-04 RX ORDER — PREDNISONE 20 MG/1
40 TABLET ORAL
Status: DISCONTINUED | OUTPATIENT
Start: 2023-05-05 | End: 2023-05-06 | Stop reason: HOSPADM

## 2023-05-04 RX ORDER — GUAIFENESIN 600 MG/1
600 TABLET, EXTENDED RELEASE ORAL EVERY 12 HOURS SCHEDULED
Status: DISCONTINUED | OUTPATIENT
Start: 2023-05-05 | End: 2023-05-06 | Stop reason: HOSPADM

## 2023-05-04 RX ORDER — NITROGLYCERIN 0.4 MG/1
0.4 TABLET SUBLINGUAL
Status: DISCONTINUED | OUTPATIENT
Start: 2023-05-04 | End: 2023-05-06 | Stop reason: HOSPADM

## 2023-05-04 RX ORDER — FLUTICASONE FUROATE, UMECLIDINIUM BROMIDE AND VILANTEROL TRIFENATATE 200; 62.5; 25 UG/1; UG/1; UG/1
POWDER RESPIRATORY (INHALATION)
COMMUNITY

## 2023-05-04 RX ORDER — METHYLPREDNISOLONE SODIUM SUCCINATE 125 MG/2ML
80 INJECTION, POWDER, LYOPHILIZED, FOR SOLUTION INTRAMUSCULAR; INTRAVENOUS ONCE
Status: COMPLETED | OUTPATIENT
Start: 2023-05-04 | End: 2023-05-04

## 2023-05-04 RX ORDER — DEXTROSE MONOHYDRATE 25 G/50ML
25 INJECTION, SOLUTION INTRAVENOUS
Status: DISCONTINUED | OUTPATIENT
Start: 2023-05-04 | End: 2023-05-06 | Stop reason: HOSPADM

## 2023-05-04 RX ORDER — SODIUM CHLORIDE 9 MG/ML
40 INJECTION, SOLUTION INTRAVENOUS AS NEEDED
Status: DISCONTINUED | OUTPATIENT
Start: 2023-05-04 | End: 2023-05-06 | Stop reason: HOSPADM

## 2023-05-04 RX ORDER — GUAIFENESIN/DEXTROMETHORPHAN 100-10MG/5
10 SYRUP ORAL EVERY 6 HOURS PRN
Status: DISCONTINUED | OUTPATIENT
Start: 2023-05-04 | End: 2023-05-06 | Stop reason: HOSPADM

## 2023-05-04 RX ORDER — SODIUM CHLORIDE 0.9 % (FLUSH) 0.9 %
10 SYRINGE (ML) INJECTION EVERY 12 HOURS SCHEDULED
Status: DISCONTINUED | OUTPATIENT
Start: 2023-05-04 | End: 2023-05-06 | Stop reason: HOSPADM

## 2023-05-04 RX ORDER — MAGNESIUM SULFATE HEPTAHYDRATE 40 MG/ML
2 INJECTION, SOLUTION INTRAVENOUS ONCE
Status: COMPLETED | OUTPATIENT
Start: 2023-05-04 | End: 2023-05-04

## 2023-05-04 RX ORDER — INSULIN LISPRO 100 [IU]/ML
0-7 INJECTION, SOLUTION INTRAVENOUS; SUBCUTANEOUS
Status: DISCONTINUED | OUTPATIENT
Start: 2023-05-05 | End: 2023-05-06 | Stop reason: HOSPADM

## 2023-05-04 RX ORDER — NICOTINE POLACRILEX 4 MG
15 LOZENGE BUCCAL
Status: DISCONTINUED | OUTPATIENT
Start: 2023-05-04 | End: 2023-05-06 | Stop reason: HOSPADM

## 2023-05-04 RX ORDER — IBUPROFEN 600 MG/1
1 TABLET ORAL
Status: DISCONTINUED | OUTPATIENT
Start: 2023-05-04 | End: 2023-05-06 | Stop reason: HOSPADM

## 2023-05-04 RX ORDER — IPRATROPIUM BROMIDE AND ALBUTEROL SULFATE 2.5; .5 MG/3ML; MG/3ML
3 SOLUTION RESPIRATORY (INHALATION) ONCE
Status: COMPLETED | OUTPATIENT
Start: 2023-05-04 | End: 2023-05-04

## 2023-05-04 RX ORDER — SODIUM CHLORIDE 0.9 % (FLUSH) 0.9 %
10 SYRINGE (ML) INJECTION AS NEEDED
Status: DISCONTINUED | OUTPATIENT
Start: 2023-05-04 | End: 2023-05-06 | Stop reason: HOSPADM

## 2023-05-04 RX ADMIN — MAGNESIUM SULFATE HEPTAHYDRATE 2 G: 2 INJECTION, SOLUTION INTRAVENOUS at 22:25

## 2023-05-04 RX ADMIN — METHYLPREDNISOLONE SODIUM SUCCINATE 80 MG: 125 INJECTION, POWDER, FOR SOLUTION INTRAMUSCULAR; INTRAVENOUS at 22:26

## 2023-05-04 RX ADMIN — IPRATROPIUM BROMIDE AND ALBUTEROL SULFATE 3 ML: .5; 3 SOLUTION RESPIRATORY (INHALATION) at 22:01

## 2023-05-05 PROBLEM — F19.10 SUBSTANCE ABUSE: Status: ACTIVE | Noted: 2023-05-05

## 2023-05-05 LAB
ANION GAP SERPL CALCULATED.3IONS-SCNC: 13.4 MMOL/L (ref 5–15)
BUN SERPL-MCNC: 15 MG/DL (ref 6–20)
BUN/CREAT SERPL: 14.4 (ref 7–25)
CALCIUM SPEC-SCNC: 9.8 MG/DL (ref 8.6–10.5)
CHLORIDE SERPL-SCNC: 99 MMOL/L (ref 98–107)
CO2 SERPL-SCNC: 20.6 MMOL/L (ref 22–29)
CREAT SERPL-MCNC: 1.04 MG/DL (ref 0.76–1.27)
DEPRECATED RDW RBC AUTO: 42.2 FL (ref 37–54)
EGFRCR SERPLBLD CKD-EPI 2021: 95.4 ML/MIN/1.73
ERYTHROCYTE [DISTWIDTH] IN BLOOD BY AUTOMATED COUNT: 14.3 % (ref 12.3–15.4)
GLUCOSE BLDC GLUCOMTR-MCNC: 107 MG/DL (ref 70–130)
GLUCOSE BLDC GLUCOMTR-MCNC: 115 MG/DL (ref 70–130)
GLUCOSE BLDC GLUCOMTR-MCNC: 118 MG/DL (ref 70–130)
GLUCOSE BLDC GLUCOMTR-MCNC: 118 MG/DL (ref 70–130)
GLUCOSE BLDC GLUCOMTR-MCNC: 127 MG/DL (ref 70–130)
GLUCOSE SERPL-MCNC: 130 MG/DL (ref 65–99)
HCT VFR BLD AUTO: 47.3 % (ref 37.5–51)
HGB BLD-MCNC: 15.9 G/DL (ref 13–17.7)
MCH RBC QN AUTO: 27.9 PG (ref 26.6–33)
MCHC RBC AUTO-ENTMCNC: 33.6 G/DL (ref 31.5–35.7)
MCV RBC AUTO: 83.1 FL (ref 79–97)
PLATELET # BLD AUTO: 257 10*3/MM3 (ref 140–450)
PMV BLD AUTO: 11.2 FL (ref 6–12)
POTASSIUM SERPL-SCNC: 4.5 MMOL/L (ref 3.5–5.2)
QT INTERVAL: 324 MS
RBC # BLD AUTO: 5.69 10*6/MM3 (ref 4.14–5.8)
SODIUM SERPL-SCNC: 133 MMOL/L (ref 136–145)
WBC NRBC COR # BLD: 8.49 10*3/MM3 (ref 3.4–10.8)

## 2023-05-05 PROCEDURE — 25010000002 THIAMINE PER 100 MG: Performed by: NURSE PRACTITIONER

## 2023-05-05 PROCEDURE — 80048 BASIC METABOLIC PNL TOTAL CA: CPT | Performed by: NURSE PRACTITIONER

## 2023-05-05 PROCEDURE — 85027 COMPLETE CBC AUTOMATED: CPT | Performed by: NURSE PRACTITIONER

## 2023-05-05 PROCEDURE — 94799 UNLISTED PULMONARY SVC/PX: CPT

## 2023-05-05 PROCEDURE — 82948 REAGENT STRIP/BLOOD GLUCOSE: CPT

## 2023-05-05 PROCEDURE — 94664 DEMO&/EVAL PT USE INHALER: CPT

## 2023-05-05 PROCEDURE — 94761 N-INVAS EAR/PLS OXIMETRY MLT: CPT

## 2023-05-05 PROCEDURE — 36415 COLL VENOUS BLD VENIPUNCTURE: CPT | Performed by: NURSE PRACTITIONER

## 2023-05-05 PROCEDURE — 63710000001 PREDNISONE PER 1 MG: Performed by: NURSE PRACTITIONER

## 2023-05-05 RX ORDER — FOLIC ACID 1 MG/1
1 TABLET ORAL DAILY
Status: DISCONTINUED | OUTPATIENT
Start: 2023-05-05 | End: 2023-05-06 | Stop reason: HOSPADM

## 2023-05-05 RX ORDER — MONTELUKAST SODIUM 10 MG/1
10 TABLET ORAL NIGHTLY
Status: DISCONTINUED | OUTPATIENT
Start: 2023-05-05 | End: 2023-05-06 | Stop reason: HOSPADM

## 2023-05-05 RX ORDER — AMLODIPINE BESYLATE 5 MG/1
10 TABLET ORAL DAILY
Status: DISCONTINUED | OUTPATIENT
Start: 2023-05-05 | End: 2023-05-06 | Stop reason: HOSPADM

## 2023-05-05 RX ORDER — BUDESONIDE AND FORMOTEROL FUMARATE DIHYDRATE 80; 4.5 UG/1; UG/1
2 AEROSOL RESPIRATORY (INHALATION)
Status: DISCONTINUED | OUTPATIENT
Start: 2023-05-05 | End: 2023-05-05

## 2023-05-05 RX ORDER — CETIRIZINE HYDROCHLORIDE 10 MG/1
10 TABLET ORAL DAILY
Status: DISCONTINUED | OUTPATIENT
Start: 2023-05-05 | End: 2023-05-06 | Stop reason: HOSPADM

## 2023-05-05 RX ORDER — FLUTICASONE PROPIONATE 50 MCG
2 SPRAY, SUSPENSION (ML) NASAL DAILY
COMMUNITY

## 2023-05-05 RX ORDER — LORATADINE 10 MG/1
10 TABLET ORAL DAILY
COMMUNITY

## 2023-05-05 RX ORDER — THIAMINE HYDROCHLORIDE 100 MG/ML
200 INJECTION, SOLUTION INTRAMUSCULAR; INTRAVENOUS EVERY 8 HOURS SCHEDULED
Status: DISCONTINUED | OUTPATIENT
Start: 2023-05-05 | End: 2023-05-06 | Stop reason: HOSPADM

## 2023-05-05 RX ORDER — LORAZEPAM 2 MG/ML
2 INJECTION INTRAMUSCULAR
Status: DISCONTINUED | OUTPATIENT
Start: 2023-05-05 | End: 2023-05-06 | Stop reason: HOSPADM

## 2023-05-05 RX ORDER — MULTIPLE VITAMINS W/ MINERALS TAB 9MG-400MCG
1 TAB ORAL DAILY
Status: DISCONTINUED | OUTPATIENT
Start: 2023-05-05 | End: 2023-05-06 | Stop reason: HOSPADM

## 2023-05-05 RX ORDER — LORAZEPAM 2 MG/ML
1 INJECTION INTRAMUSCULAR
Status: DISCONTINUED | OUTPATIENT
Start: 2023-05-05 | End: 2023-05-06 | Stop reason: HOSPADM

## 2023-05-05 RX ORDER — LORAZEPAM 1 MG/1
1 TABLET ORAL
Status: DISCONTINUED | OUTPATIENT
Start: 2023-05-05 | End: 2023-05-06 | Stop reason: HOSPADM

## 2023-05-05 RX ORDER — ACETAMINOPHEN 325 MG/1
650 TABLET ORAL EVERY 6 HOURS PRN
Status: DISCONTINUED | OUTPATIENT
Start: 2023-05-05 | End: 2023-05-06 | Stop reason: HOSPADM

## 2023-05-05 RX ORDER — LORAZEPAM 1 MG/1
2 TABLET ORAL
Status: DISCONTINUED | OUTPATIENT
Start: 2023-05-05 | End: 2023-05-06 | Stop reason: HOSPADM

## 2023-05-05 RX ORDER — ALBUTEROL SULFATE 2.5 MG/3ML
2.5 SOLUTION RESPIRATORY (INHALATION) EVERY 4 HOURS PRN
Status: DISCONTINUED | OUTPATIENT
Start: 2023-05-05 | End: 2023-05-06 | Stop reason: HOSPADM

## 2023-05-05 RX ORDER — FLUTICASONE PROPIONATE 50 MCG
2 SPRAY, SUSPENSION (ML) NASAL DAILY
Status: DISCONTINUED | OUTPATIENT
Start: 2023-05-05 | End: 2023-05-06 | Stop reason: HOSPADM

## 2023-05-05 RX ORDER — IPRATROPIUM BROMIDE AND ALBUTEROL SULFATE 2.5; .5 MG/3ML; MG/3ML
3 SOLUTION RESPIRATORY (INHALATION)
Status: DISCONTINUED | OUTPATIENT
Start: 2023-05-05 | End: 2023-05-06 | Stop reason: HOSPADM

## 2023-05-05 RX ORDER — BUDESONIDE AND FORMOTEROL FUMARATE DIHYDRATE 160; 4.5 UG/1; UG/1
2 AEROSOL RESPIRATORY (INHALATION)
Status: DISCONTINUED | OUTPATIENT
Start: 2023-05-05 | End: 2023-05-06 | Stop reason: HOSPADM

## 2023-05-05 RX ORDER — AMLODIPINE BESYLATE 10 MG/1
10 TABLET ORAL DAILY
COMMUNITY

## 2023-05-05 RX ADMIN — FLUTICASONE PROPIONATE 2 SPRAY: 50 SPRAY, METERED NASAL at 09:15

## 2023-05-05 RX ADMIN — IPRATROPIUM BROMIDE AND ALBUTEROL SULFATE 3 ML: .5; 3 SOLUTION RESPIRATORY (INHALATION) at 19:51

## 2023-05-05 RX ADMIN — MULTIPLE VITAMINS W/ MINERALS TAB 1 TABLET: TAB at 09:15

## 2023-05-05 RX ADMIN — GUAIFENESIN 600 MG: 600 TABLET, EXTENDED RELEASE ORAL at 20:34

## 2023-05-05 RX ADMIN — TIOTROPIUM BROMIDE INHALATION SPRAY 2 PUFF: 3.12 SPRAY, METERED RESPIRATORY (INHALATION) at 07:29

## 2023-05-05 RX ADMIN — THIAMINE HYDROCHLORIDE 200 MG: 100 INJECTION, SOLUTION INTRAMUSCULAR; INTRAVENOUS at 07:00

## 2023-05-05 RX ADMIN — IPRATROPIUM BROMIDE AND ALBUTEROL SULFATE 3 ML: .5; 3 SOLUTION RESPIRATORY (INHALATION) at 15:22

## 2023-05-05 RX ADMIN — BUDESONIDE AND FORMOTEROL FUMARATE DIHYDRATE 2 PUFF: 160; 4.5 AEROSOL RESPIRATORY (INHALATION) at 07:29

## 2023-05-05 RX ADMIN — GUAIFENESIN 600 MG: 600 TABLET, EXTENDED RELEASE ORAL at 02:43

## 2023-05-05 RX ADMIN — Medication 10 ML: at 02:44

## 2023-05-05 RX ADMIN — AMLODIPINE BESYLATE 10 MG: 5 TABLET ORAL at 09:14

## 2023-05-05 RX ADMIN — ACETAMINOPHEN 650 MG: 325 TABLET, FILM COATED ORAL at 15:18

## 2023-05-05 RX ADMIN — CETIRIZINE HYDROCHLORIDE 10 MG: 10 TABLET ORAL at 09:15

## 2023-05-05 RX ADMIN — MONTELUKAST SODIUM 10 MG: 10 TABLET, FILM COATED ORAL at 20:34

## 2023-05-05 RX ADMIN — Medication 10 ML: at 20:34

## 2023-05-05 RX ADMIN — THIAMINE HYDROCHLORIDE 200 MG: 100 INJECTION, SOLUTION INTRAMUSCULAR; INTRAVENOUS at 16:56

## 2023-05-05 RX ADMIN — GUAIFENESIN 600 MG: 600 TABLET, EXTENDED RELEASE ORAL at 09:15

## 2023-05-05 RX ADMIN — PREDNISONE 40 MG: 20 TABLET ORAL at 09:15

## 2023-05-05 RX ADMIN — Medication 10 ML: at 09:15

## 2023-05-05 RX ADMIN — ACETAMINOPHEN 650 MG: 325 TABLET, FILM COATED ORAL at 22:18

## 2023-05-05 RX ADMIN — THIAMINE HYDROCHLORIDE 200 MG: 100 INJECTION, SOLUTION INTRAMUSCULAR; INTRAVENOUS at 22:21

## 2023-05-05 RX ADMIN — Medication 1 MG: at 09:15

## 2023-05-05 RX ADMIN — ACETAMINOPHEN 650 MG: 325 TABLET, FILM COATED ORAL at 09:14

## 2023-05-05 RX ADMIN — ACETAMINOPHEN 650 MG: 325 TABLET, FILM COATED ORAL at 02:43

## 2023-05-05 NOTE — PLAN OF CARE
Goal Outcome Evaluation:  Plan of Care Reviewed With: patient        Progress: improving  Outcome Evaluation: VSS, pt now on room air.  Complaints of a headache, treated with PRN tylenol.  Tolerating a regular diet.  Pt alert and oriented x4.  CIWA a 3 due to headache.  Continue breathing treatments and prednisone.

## 2023-05-05 NOTE — PLAN OF CARE
Goal Outcome Evaluation:  Plan of Care Reviewed With: patient      Progress: no change  Outcome Evaluation: New admit tonight for respiratory failure. Stand-by assist for safety. Regular diet. VSS. Oriented x 4. SR on the montior. 6L HFNC. CIWA scores 0. Breathing treatments. Tylenol PRN. Blood glucose monitoring.
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
## 2023-05-05 NOTE — ED NOTES
Nursing report ED to floor  Craig Bolanos  37 y.o.  male    HPI :   Chief Complaint   Patient presents with    Shortness of Breath       Admitting doctor:   John Harris DO    Admitting diagnosis:   The primary encounter diagnosis was Acute respiratory failure with hypoxia. A diagnosis of Severe persistent asthma with exacerbation was also pertinent to this visit.    Code status:   Current Code Status       Date Active Code Status Order ID Comments User Context       5/4/2023 2335 CPR (Attempt to Resuscitate) [id number]  Bee Butts APRN ED        Question Answer    Code Status (Patient has no pulse and is not breathing) CPR (Attempt to Resuscitate)    Medical Interventions (Patient has pulse or is breathing) Full Support                    Allergies:   Patient has no known allergies.    Isolation:   No active isolations    Intake and Output    Intake/Output Summary (Last 24 hours) at 5/5/2023 0019  Last data filed at 5/4/2023 2315  Gross per 24 hour   Intake 50 ml   Output --   Net 50 ml       Weight:       05/04/23 2117   Weight: 64.4 kg (142 lb)       Most recent vitals:   Vitals:    05/04/23 2321 05/04/23 2322 05/04/23 2331 05/04/23 2332   BP:   136/97    Pulse: 92 97  94   Resp:       Temp:       TempSrc:       SpO2: 96% 96%  98%   Weight:       Height:           Active LDAs/IV Access:   Lines, Drains & Airways       Active LDAs       Name Placement date Placement time Site Days    Peripheral IV 05/04/23 2224 Right Antecubital 05/04/23 2224  Antecubital  less than 1                    Labs (abnormal labs have a star):   Labs Reviewed   BASIC METABOLIC PANEL - Abnormal; Notable for the following components:       Result Value    Glucose 147 (*)     CO2 20.7 (*)     Calcium 10.6 (*)     All other components within normal limits    Narrative:     GFR Normal >60  Chronic Kidney Disease <60  Kidney Failure <15     CBC WITH AUTO DIFFERENTIAL - Abnormal; Notable for the following components:    RBC 5.91 (*)  "    All other components within normal limits   MANUAL DIFFERENTIAL - Abnormal; Notable for the following components:    Neutrophil % 89.0 (*)     Lymphocyte % 10.0 (*)     Monocyte % 1.0 (*)     Neutrophils Absolute 7.57 (*)     Monocytes Absolute 0.09 (*)     All other components within normal limits   RESPIRATORY PANEL PCR W/ COVID-19 (SARS-COV-2) PAMELA/ROSALINDA/JASON/PAD/COR/MAD/VALERIA IN-HOUSE, NP SWAB IN UTM/VTP, 3-4 HR TAT - Normal    Narrative:     In the setting of a positive respiratory panel with a viral infection PLUS a negative procalcitonin without other underlying concern for bacterial infection, consider observing off antibiotics or discontinuation of antibiotics and continue supportive care. If the respiratory panel is positive for atypical bacterial infection (Bordetella pertussis, Chlamydophila pneumoniae, or Mycoplasma pneumoniae), consider antibiotic de-escalation to target atypical bacterial infection.   D-DIMER, QUANTITATIVE - Normal    Narrative:     According to the assay 's published package insert, a normal (<0.50 MCGFEU/mL) D-dimer result in conjunction with a non-high clinical probability assessment, excludes deep vein thrombosis (DVT) and pulmonary embolism (PE) with high sensitivity.    D-dimer values increase with age and this can make VTE exclusion of an older population difficult. To address this, the [employer], based on best available evidence and recent guidelines, recommends that clinicians use age-adjusted D-dimer thresholds in patients greater than 50 years of age with: a) a low probability of PE who do not meet all Pulmonary Embolism Rule Out Criteria, or b) in those with intermediate probability of PE.   The formula for an age-adjusted D-dimer cut-off is \"age/100\".  For example, a 60 year old patient would have an age-adjusted cut-off of 0.60 MCGFEU/mL and an 80 year old 0.80 MCGFEU/mL.   MAGNESIUM - Normal   CBC (NO DIFF)   BASIC METABOLIC PANEL   POCT " GLUCOSE FINGERSTICK   POCT GLUCOSE FINGERSTICK   POCT GLUCOSE FINGERSTICK   CBC AND DIFFERENTIAL    Narrative:     The following orders were created for panel order CBC & Differential.  Procedure                               Abnormality         Status                     ---------                               -----------         ------                     CBC Auto Differential[[id number]]        Abnormal            Final result                 Please view results for these tests on the individual orders.       EKG:   ECG 12 Lead Dyspnea   Preliminary Result   HEART RATE= 114  bpm   RR Interval= 526  ms   TX Interval= 136  ms   P Horizontal Axis= -24  deg   P Front Axis= 82  deg   QRSD Interval= 88  ms   QT Interval= 324  ms   QRS Axis= 61  deg   T Wave Axis= 28  deg   - ABNORMAL ECG -   Sinus tachycardia   Biatrial enlargement   Probable left ventricular hypertrophy   Electronically Signed By:    Date and Time of Study: 2023-05-04 22:02:21          Meds given in ED:   Medications   sodium chloride 0.9 % flush 10 mL (has no administration in time range)   sodium chloride 0.9 % flush 10 mL (has no administration in time range)   sodium chloride 0.9 % infusion 40 mL (has no administration in time range)   nitroglycerin (NITROSTAT) SL tablet 0.4 mg (has no administration in time range)   predniSONE (DELTASONE) tablet 40 mg (has no administration in time range)   insulin lispro (HUMALOG/ADMELOG) injection 0-7 Units (has no administration in time range)   dextrose (GLUTOSE) oral gel 15 g (has no administration in time range)   dextrose (D50W) (25 g/50 mL) IV injection 25 g (has no administration in time range)   glucagon (GLUCAGEN) injection 1 mg (has no administration in time range)   guaiFENesin-dextromethorphan (ROBITUSSIN DM) 100-10 MG/5ML syrup 10 mL (has no administration in time range)   guaiFENesin (MUCINEX) 12 hr tablet 600 mg (has no administration in time range)   ipratropium-albuterol (DUO-NEB) nebulizer  solution 3 mL (3 mL Nebulization Given 23)   magnesium sulfate 2g/50 mL (PREMIX) infusion (0 g Intravenous Stopped 235)   methylPREDNISolone sodium succinate (SOLU-Medrol) injection 80 mg (80 mg Intravenous Given 23)       Imaging results:  XR Chest 1 View    Result Date: 2023  No acute findings.  This report was finalized on 2023 10:24 PM by Dr. Mary Blake M.D.       Ambulatory status:   - ad marline      Social issues:   Social History     Socioeconomic History    Marital status: Single   Tobacco Use    Smoking status: Former     Packs/day: 1.00     Years: 10.00     Pack years: 10.00     Types: Cigarettes     Quit date: 2013     Years since quittin.9    Smokeless tobacco: Never   Vaping Use    Vaping Use: Never used   Substance and Sexual Activity    Alcohol use: Yes     Comment: Occasional    Drug use: Yes     Types: Marijuana     Comment: Daily    Sexual activity: Defer       NIH Stroke Scale:         Katie Jones RN  23 00:19 EDT

## 2023-05-05 NOTE — ED PROVIDER NOTES
EMERGENCY DEPARTMENT ENCOUNTER    Room Number:  24/24  Date seen:  5/4/2023  PCP: Provider, No Known  Historian: Patient,  at bedside      HPI:  Chief Complaint: Shortness of breath  A complete HPI/ROS/PMH/PSH/SH/FH are unobtainable due to:   Context: Craig Bolanos is a 37 y.o. male who presents to the ED c/o shortness of breath.  Patient has had shortness of breath worsening over the last several days.  Apparently seen at [facility] earlier today.  He had extensive work-up and was diagnosed with asthma exacerbation.  He states he was treated with continuous nebulizers and IV Solu-Medrol.  He was discharged home but became short of breath again and return for further evaluation treatment.  He does report occasional dry cough.  No recent fever.  No chest pain.  Patient does not smoke cigarettes but does smoke occasional marijuana.  He sees a pulmonologist at [facility], Dr. Cordero.      MEDICAL RECORD REVIEW (non ED)  I reviewed prior medical records note the patient was seen several weeks ago by primary care provider with asthma exacerbation.  Last admission in November of last year with asthma exacerbation.    PAST MEDICAL HISTORY  Active Ambulatory Problems     Diagnosis Date Noted   • Severe persistent asthma with status asthmaticus 06/01/2017   • COPD (chronic obstructive pulmonary disease) (HCC) 09/17/2017   • Benign essential HTN 09/17/2017   • Hilar lymphadenopathy 09/19/2017   • Severe persistent asthma with exacerbation 10/14/2022   • Exacerbation of asthma, unspecified asthma severity, unspecified whether persistent 11/14/2022     Resolved Ambulatory Problems     Diagnosis Date Noted   • Acute respiratory failure with hypoxia 09/17/2017     Past Medical History:   Diagnosis Date   • Asthma    • Hypertension    • Migraine          PAST SURGICAL HISTORY  History reviewed. No pertinent surgical history.      FAMILY HISTORY  Family History   Problem Relation Age of Onset   • Diabetes Mother           SOCIAL HISTORY  Social History     Socioeconomic History   • Marital status: Single   Tobacco Use   • Smoking status: Former     Packs/day: 1.00     Years: 10.00     Pack years: 10.00     Types: Cigarettes     Quit date: 2013     Years since quittin.9   • Smokeless tobacco: Never   Vaping Use   • Vaping Use: Never used   Substance and Sexual Activity   • Alcohol use: Yes     Comment: Occasional   • Drug use: Yes     Types: Marijuana     Comment: Daily   • Sexual activity: Defer         ALLERGIES  Patient has no known allergies.        REVIEW OF SYSTEMS  Review of Systems   Constitutional: Positive for fatigue. Negative for fever.   Respiratory: Positive for cough and shortness of breath.    Cardiovascular: Negative for chest pain.   All other systems reviewed and are negative.           PHYSICAL EXAM  ED Triage Vitals   Temp Heart Rate Resp BP SpO2   23   97.1 °F (36.2 °C) (!) 129 (!) 33 122/98 94 %      Temp src Heart Rate Source Patient Position BP Location FiO2 (%)   23 -- -- -- --   Tympanic           Physical Exam    GENERAL: Alert male in moderate distress.  Triage vitals reviewed notable for initial pulse 129.  Respirations of 33.  O2 sats 94%.  Temperature 97.1  HENT: nares patent  EYES: no scleral icterus  CV: regular rhythm, regular rate  RESPIRATORY: normal effort, decreased breath sounds bilaterally- patient has O2 sats in the upper 90s on 4 L nasal cannula  ABDOMEN: soft, nontender to palpation  MUSCULOSKELETAL: no deformity-no significant swelling or tenderness to palpation  NEURO: Strength sensation and coordination are grossly intact.  Speech and mentation are unremarkable  SKIN: warm, dry      Vital signs and nursing notes reviewed.          LAB RESULTS  Recent Results (from the past 24 hour(s))   Basic Metabolic Panel    Collection Time: 23  9:59 PM    Specimen: Blood   Result Value Ref Range     Glucose 147 (H) 65 - 99 mg/dL    BUN 9 6 - 20 mg/dL    Creatinine 1.20 0.76 - 1.27 mg/dL    Sodium 140 136 - 145 mmol/L    Potassium 4.2 3.5 - 5.2 mmol/L    Chloride 105 98 - 107 mmol/L    CO2 20.7 (L) 22.0 - 29.0 mmol/L    Calcium 10.6 (H) 8.6 - 10.5 mg/dL    BUN/Creatinine Ratio 7.5 7.0 - 25.0    Anion Gap 14.3 5.0 - 15.0 mmol/L    eGFR 80.4 >60.0 mL/min/1.73   D-dimer, Quantitative    Collection Time: 05/04/23  9:59 PM    Specimen: Blood   Result Value Ref Range    D-Dimer, Quantitative <0.27 0.00 - 0.50 MCGFEU/mL   Magnesium    Collection Time: 05/04/23  9:59 PM    Specimen: Blood   Result Value Ref Range    Magnesium 2.0 1.6 - 2.6 mg/dL   CBC Auto Differential    Collection Time: 05/04/23  9:59 PM    Specimen: Blood   Result Value Ref Range    WBC 8.51 3.40 - 10.80 10*3/mm3    RBC 5.91 (H) 4.14 - 5.80 10*6/mm3    Hemoglobin 17.1 13.0 - 17.7 g/dL    Hematocrit 49.8 37.5 - 51.0 %    MCV 84.3 79.0 - 97.0 fL    MCH 28.9 26.6 - 33.0 pg    MCHC 34.3 31.5 - 35.7 g/dL    RDW 14.2 12.3 - 15.4 %    RDW-SD 43.5 37.0 - 54.0 fl    MPV 10.8 6.0 - 12.0 fL    Platelets 248 140 - 450 10*3/mm3    nRBC 0.0 0.0 - 0.2 /100 WBC   Manual Differential    Collection Time: 05/04/23  9:59 PM    Specimen: Blood   Result Value Ref Range    Neutrophil % 89.0 (H) 42.7 - 76.0 %    Lymphocyte % 10.0 (L) 19.6 - 45.3 %    Monocyte % 1.0 (L) 5.0 - 12.0 %    Neutrophils Absolute 7.57 (H) 1.70 - 7.00 10*3/mm3    Lymphocytes Absolute 0.85 0.70 - 3.10 10*3/mm3    Monocytes Absolute 0.09 (L) 0.10 - 0.90 10*3/mm3    RBC Morphology Normal Normal    WBC Morphology Normal Normal    Platelet Estimate Adequate Normal   Respiratory Panel PCR w/COVID-19(SARS-CoV-2) PAMELA/ROSALINDA/JASON/PAD/COR/MAD/VALERIA In-House, NP Swab in UTM/VTM, 3-4 HR TAT - Swab, Nasopharynx    Collection Time: 05/04/23 10:01 PM    Specimen: Nasopharynx; Swab   Result Value Ref Range    ADENOVIRUS, PCR Not Detected Not Detected    Coronavirus 229E Not Detected Not Detected    Coronavirus HKU1 Not  Detected Not Detected    Coronavirus NL63 Not Detected Not Detected    Coronavirus OC43 Not Detected Not Detected    COVID19 Not Detected Not Detected - Ref. Range    Human Metapneumovirus Not Detected Not Detected    Human Rhinovirus/Enterovirus Not Detected Not Detected    Influenza A PCR Not Detected Not Detected    Influenza B PCR Not Detected Not Detected    Parainfluenza Virus 1 Not Detected Not Detected    Parainfluenza Virus 2 Not Detected Not Detected    Parainfluenza Virus 3 Not Detected Not Detected    Parainfluenza Virus 4 Not Detected Not Detected    RSV, PCR Not Detected Not Detected    Bordetella pertussis pcr Not Detected Not Detected    Bordetella parapertussis PCR Not Detected Not Detected    Chlamydophila pneumoniae PCR Not Detected Not Detected    Mycoplasma pneumo by PCR Not Detected Not Detected   ECG 12 Lead Dyspnea    Collection Time: 05/04/23 10:02 PM   Result Value Ref Range    QT Interval 324 ms       Ordered the above labs and independently interpreted results. My findings will be discussed in the medical decision making section below        RADIOLOGY  XR Chest 1 View    Result Date: 5/4/2023  SINGLE VIEW OF THE CHEST  HISTORY: Shortness of breath  COMPARISON: None available.  FINDINGS: Heart size is within normal limits. No pneumothorax or pleural effusion is seen. No acute infiltrates are identified. Images do suggest some background emphysematous changes.      No acute findings.  This report was finalized on 5/4/2023 10:24 PM by Dr. Mary Blake M.D.        I ordered and independently reviewed the above noted radiographic studies.      I viewed images of chest x-ray which showed hyperaeration, no active disease per my independent interpretation.    See radiologist's dictation for official interpretation.             PROCEDURES  Procedures          MEDICATIONS GIVEN IN ER  Medications   sodium chloride 0.9 % flush 10 mL (has no administration in time range)   sodium chloride 0.9 %  flush 10 mL (has no administration in time range)   sodium chloride 0.9 % infusion 40 mL (has no administration in time range)   nitroglycerin (NITROSTAT) SL tablet 0.4 mg (has no administration in time range)   predniSONE (DELTASONE) tablet 40 mg (has no administration in time range)   insulin lispro (HUMALOG/ADMELOG) injection 0-7 Units (has no administration in time range)   dextrose (GLUTOSE) oral gel 15 g (has no administration in time range)   dextrose (D50W) (25 g/50 mL) IV injection 25 g (has no administration in time range)   glucagon (GLUCAGEN) injection 1 mg (has no administration in time range)   guaiFENesin-dextromethorphan (ROBITUSSIN DM) 100-10 MG/5ML syrup 10 mL (has no administration in time range)   guaiFENesin (MUCINEX) 12 hr tablet 600 mg (has no administration in time range)   ipratropium-albuterol (DUO-NEB) nebulizer solution 3 mL (3 mL Nebulization Given 5/4/23 2201)   magnesium sulfate 2g/50 mL (PREMIX) infusion (0 g Intravenous Stopped 5/4/23 2315)   methylPREDNISolone sodium succinate (SOLU-Medrol) injection 80 mg (80 mg Intravenous Given 5/4/23 2226)               MEDICAL DECISION MAKING, PROGRESS, and CONSULTS    All labs have been independently reviewed by me.  All radiology studies have been reviewed by me and I have also reviewed the radiology report.   EKG's independently viewed and interpreted by me.  Discussion below represents my analysis of pertinent findings related to patient's condition, differential diagnosis, treatment plan and final disposition.      Additional sources:  - Discussed/ obtained information from independent historians: Cousin at bedside    - External (non-ED) record review: Please see documented above    - Chronic or social conditions impacting care: Chronic asthma/COPD    - Shared decision making: I discussed ED evaluation and treatment plan with patient who is in agreement.  Patient hypoxic and requiring 4 L of oxygen to maintain O2 sats.  Still with wheezing  despite earlier treatment at outlying ED ([facility]).  Will admit to the hospital for treatment of asthma/COPD.      Orders placed during this visit:  Orders Placed This Encounter   Procedures   • Respiratory Panel PCR w/COVID-19(SARS-CoV-2) PAMELA/ROSALINDA/JASON/PAD/COR/MAD/VALERIA In-House, NP Swab in UTM/VTM, 3-4 HR TAT - Swab, Nasopharynx   • XR Chest 1 View   • Basic Metabolic Panel   • D-dimer, Quantitative   • Magnesium   • CBC Auto Differential   • Manual Differential   • Potassium   • Magnesium   • High Sensitivity Troponin T   • Blood Gas, Arterial -   • CBC (No Diff)   • Basic Metabolic Panel   • Diet: Regular/House Diet; Texture: Regular Texture (IDDSI 7); Fluid Consistency: Thin (IDDSI 0)   • Reason for COPD Admission: Natural Disease Progression   • Tobacco Cessation Education   • Respiratory Treatment Education (MDI / Spacer / Nebulizer)   • COPD Education   • Vital Signs   • Intake & Output   • Weigh Patient   • Oral Care   • Place Sequential Compression Device   • Maintain Sequential Compression Device   • Telemetry - Maintain IV Access   • May Be Off Telemetry for Tests   • Cardiac Monitoring   • Pulse Oximetry, Continuous   • Up With Assistance   • Daily Weights   • Cough / Deep Breathe   • Notify Provider if POC Glucose Greater Than 180 x2   • Code Status and Medical Interventions:   • LHA (on-call MD unless specified) Details   • Inpatient Pulmonology Consult   • Document Pulse Oximetry - On Room Air / Home O2 Level   • Oxygen Therapy- Nasal Cannula; Titrate for SPO2: 90% - 95%   • Incentive Spirometry   • POC Glucose TID AC   • ECG 12 Lead Dyspnea   • ECG 12 Lead Chest Pain   • Insert Peripheral IV   • Inpatient Admission   • CBC & Differential           Differential diagnosis:    Please see as documented below in ED course      Independent interpretation of labs, radiology studies, and discussions with consultants:  ED Course as of 05/04/23 2340   Thu May 04, 2023   2156 REC-73-elon-old male with  history of asthma, COPD presents with increased shortness of breath despite being seen earlier today at [facility] emergency department.    On exam he is requiring supplemental oxygen by nasal cannula.  Breath sounds are markedly diminished.    Assessment-most likely asthma exacerbation, perhaps COPD playing some role.  We will go ahead and check for pulmonary infection, pneumonia.  Pulmonary embolism seems unlikely but will check D-dimer.  Will check EKG to look for dysrhythmia or heart problems.    Patient will likely require admission as he was seen earlier and is now requiring oxygen.  We will go ahead and give DuoNeb treatment, steroids and magnesium. [DB]   2219 EKG independently interpreted by me  Time 10:02 PM    Sinus tachycardia 114  P waves-inverted suggesting atrial enlargement  QRS-LVH, normal axis  ST, T wave-nonspecific change    Not significant change when compared to 11/2022 [DB]   2310 Chest x-ray independently interpreted by me shows hyperinflation but no obvious acute disease.    Official interpretation by Dr. Blake is in agreement [DB]   2311 Labs reviewed are fairly benign with a  normal white count and hemoglobin.    Chemistry is fairly unremarkable without significant renal failure or electrolyte disturbance.  There are some mild hypercalcemia of 10.6.  Mild metabolic acidosis with bicarb of 21.  D-dimer normal would go against pulmonary embolism.  Viral panel negative. [DB]   2312 On repeat assessment patient is improved but still requiring oxygen to maintain sats greater than 90%.  Will admit to the hospital for asthma exacerbation and hypoxic respiratory failure. [DB]   2313 I spoke with Lucero from [facility] who will admit on behalf of Dr. John Harris. [DB]      ED Course User Index  [DB] Jona Groves MD             I used full protective equipment while examining this patient.  This includes face mask, gloves and protective eyewear.  I washed my hands before entering the room and  immediately upon leaving the room    DIAGNOSIS  Final diagnoses:   Acute respiratory failure with hypoxia   Severe persistent asthma with exacerbation         DISPOSITION  Admission            Latest Documented Vital Signs:  As of 23:40 EDT  BP- 136/97 HR- 94 Temp- 97.1 °F (36.2 °C) (Tympanic) O2 sat- 98%              --    Please note that portions of this were completed with a voice recognition program.       Note Disclaimer: At [facility], we believe that sharing information builds trust and better relationships. You are receiving this note because you are receiving care at [facility] or recently visited. It is possible you will see health information before a provider has talked with you about it. This kind of information can be easy to misunderstand. To help you fully understand what it means for your health, we urge you to discuss this note with your provider.           Jona Groves MD  05/04/23 9391

## 2023-05-05 NOTE — NURSING NOTE
Attempted to call the ordered routine pulmonary consult and answering service is down. Will pass along to day RN to place consult.

## 2023-05-05 NOTE — CONSULTS
Patient Identification:  Craig Bolanos  37 y.o.  male  1985  [id number]          LOS 1    Requesting physician: Dr Masters    Reason for Consultation: Asthma exacerbation    History of Present Illness:     37-year-old male with a history of obstructive lung disease presents with shortness of breath with associated wheezing that started earlier in the day of admission.  Has been using his rescue inhaler and nebulized bronchodilators more frequently without improvement.  Recent admission at [facility] for asthma exacerbation noted to have oxygen saturations in the upper 70s on room air on presentation to the emergency room here.  Patient uses alcohol and marijuana daily.  Complained of shortness of breath with wheezing, chest tightness and cough.  Previous smoker of cigarettes of 1 pack/day and quit in 2013.        Past Medical History:  Past Medical History:   Diagnosis Date   • Alcohol abuse    • Asthma    • COPD (chronic obstructive pulmonary disease)    • Hypertension    • Migraine        Past Surgical History:  History reviewed. No pertinent surgical history.     Home Meds:  Medications Prior to Admission   Medication Sig Dispense Refill Last Dose   • fluticasone (FLONASE) 50 MCG/ACT nasal spray 2 sprays into the nostril(s) as directed by provider Daily.   5/4/2023   • Fluticasone-Umeclidin-Vilant (Trelegy Ellipta) 200-62.5-25 MCG/ACT aerosol powder  Inhale.   5/4/2023   • loratadine (Claritin) 10 MG tablet Take 1 tablet by mouth Daily.   5/4/2023   • mometasone-formoterol (DULERA 100) 100-5 MCG/ACT inhaler Inhale 2 puffs 2 (Two) Times a Day.   5/4/2023   • montelukast (SINGULAIR) 10 MG tablet Take 1 tablet by mouth Every Night. 30 tablet 0 5/4/2023   • albuterol (PROVENTIL) (2.5 MG/3ML) 0.083% nebulizer solution Take 2.5 mg by nebulization Every 4 (Four) Hours As Needed for Wheezing. 120 each 0    • amLODIPine (NORVASC) 10 MG tablet Take 1 tablet by mouth Daily.   Unknown   • budesonide-formoterol  "(SYMBICORT) 160-4.5 MCG/ACT inhaler Inhale 2 puffs 2 (Two) Times a Day. 1 each 0          Allergies:  No Known Allergies    Social History:   Social History     Socioeconomic History   • Marital status: Single   Tobacco Use   • Smoking status: Former     Packs/day: 1.00     Years: 10.00     Pack years: 10.00     Types: Cigarettes     Quit date: 2013     Years since quittin.9   • Smokeless tobacco: Never   Vaping Use   • Vaping Use: Never used   Substance and Sexual Activity   • Alcohol use: Yes     Comment: Occasional   • Drug use: Yes     Types: Marijuana     Comment: Daily   • Sexual activity: Defer       Family History:  Family History   Problem Relation Age of Onset   • Diabetes Mother        Review of Systems:  Denies fevers or chills  Denies nausea or vomiting  No new vision or hearing changes  No chest pain  Cough with shortness of breath and wheezing  No diarrhea, hematemesis or hematochezia, no dysuria or frequency  No musculoskeletal complaints  No heat or cold intolerance  No skin rashes  No dizziness or confusion.  No seizure activity  No new anxiety or depression  12 system review of systems performed and all else negative    Objective:    PHYSICAL EXAM:    /91 (BP Location: Left arm, Patient Position: Lying)   Pulse 92   Temp 97.8 °F (36.6 °C) (Oral)   Resp 16   Ht 177.8 cm (70\")   Wt 65.3 kg (144 lb)   SpO2 94%   BMI 20.66 kg/m²  Body mass index is 20.66 kg/m². 94% 65.3 kg (144 lb)    GENERAL APPEARANCE:   · Well developed  · Well nourished  · No acute distress   EYES:    · PERRL                                                                           · Conjunctivae normal  · Sclerae nonicteric.  HENT:   · Atraumatic, normocephalic  · External ears and nose normal  · Moist mucous membranes and no ulcers  NECK:  · Thyroid not enlarged  · Trachea midline   RESPIRATORY:    · Nonlabored breathing   · Diminished bilateral breath sounds  · No rales. No wheezing  · No " dullness  CARDIOVASCULAR:    · RRR  · Normal S1, S2  · No murmur  · Lower extremity edema: none    GI:   · Bowel sounds normal  · Abdomen soft , nondistended, nontender  · No abdominal masses  MUSCULOSKELETAL:  · Normal movement of extremities  · No tenderness, no deformities  · No clubbing or cyanosis   Skin:    · No visible rashes  · No palpable nodules  · Cap refill normal.  No mottling.   PSYCHIATRIC:  · Speech and behavior appropriate  · Normal mood and affect  · Oriented to person, place and time  NEUROLOGIC:  Cranial nerves II through XII grossly intact.  Sensation intact.      Lab Review:   Results from last 7 days   Lab Units 05/05/23  0640 05/04/23  2159   WBC 10*3/mm3 8.49 8.51   HEMOGLOBIN g/dL 15.9 17.1   HEMATOCRIT % 47.3 49.8   PLATELETS 10*3/mm3 257 248     Results from last 7 days   Lab Units 05/05/23  0640 05/04/23  2159   SODIUM mmol/L 133* 140   POTASSIUM mmol/L 4.5 4.2   CHLORIDE mmol/L 99 105   CO2 mmol/L 20.6* 20.7*   BUN mg/dL 15 9   CREATININE mg/dL 1.04 1.20   CALCIUM mg/dL 9.8 10.6*   GLUCOSE mg/dL 130* 147*                       Imaging reviewed  chest X-ray 5/4 reviewed shows no acute cardiopulmonary process       Assessment:  Acute respiratory failure with hypoxemia present on admission  Severe persistent asthma with exacerbation  Chronic substance abuse          Recommendations:  Treat with scheduled and as needed bronchodilators with Symbicort and Spiriva in addition to scheduled nebulized bronchodilators and steroid taper.  Patient needs to avoid all smoking.  Wean oxygen as able.          Cristian Andrea MD  [facility], [facility]  Pulmonary and Critical Care Medicine    5/5/2023  09:10 EDT

## 2023-05-05 NOTE — CASE MANAGEMENT/SOCIAL WORK
Discharge Planning Assessment  Psychiatric     Patient Name: Craig Bolanos  MRN: [id number]  Today's Date: 5/5/2023    Admit Date: 5/4/2023    Plan: Return home with family   Discharge Needs Assessment     Row Name 05/05/23 1003       Living Environment    Name(s) of People in Home Oc    Row Name 05/05/23 1001       Living Environment    People in Home sibling(s)    Name(s) of People in Home Sister    Current Living Arrangements apartment    Potentially Unsafe Housing Conditions none    Primary Care Provided by self    Provides Primary Care For no one, unable/limited ability to care for self    Family Caregiver if Needed parent(s)    Family Caregiver Names Mother Ann Bolanos [phone]    Quality of Family Relationships helpful    Able to Return to Prior Arrangements yes       Resource/Environmental Concerns    Resource/Environmental Concerns none    Transportation Concerns none       Transition Planning    Patient/Family Anticipates Transition to home with family    Patient/Family Anticipated Services at Transition none    Transportation Anticipated family or friend will provide       Discharge Needs Assessment    Readmission Within the Last 30 Days no previous admission in last 30 days    Equipment Currently Used at Home nebulizer    Concerns to be Addressed denies needs/concerns at this time    Anticipated Changes Related to Illness none    Equipment Needed After Discharge none               Discharge Plan     Row Name 05/05/23 1009       Plan    Plan Return home with family    Patient/Family in Agreement with Plan yes    Plan Comments Spoke with patient at bedside.  Patient lives with his sister, is IADL, uses a nebulizer.   PCP is Dr. Nakul Paul and pharmacy is [facility].  Patient drives but his sister also drives and he states he has a very good support system.  He plans to return home at [place].  CCP will follow.  Sonja RODRIGES              Continued Care and Services - Admitted Since 5/4/2023     Coordination has not been started for this encounter.       Expected Discharge Date and Time     Expected Discharge Date Expected Discharge Time    May 8, 2023          Demographic Summary     Row Name 05/05/23 1000       General Information    Admission Type inpatient    Arrived From home    Referral Source admission list    Reason for Consult discharge planning    Preferred Language English               Functional Status     Row Name 05/05/23 1001       Functional Status    Usual Activity Tolerance good    Current Activity Tolerance moderate       Functional Status, IADL    Medications independent    Meal Preparation assistive person  Lives with his sister    Housekeeping assistive person    Laundry assistive person    Shopping assistive person       Mental Status    General Appearance WDL WDL       Mental Status Summary    Recent Changes in Mental Status/Cognitive Functioning no changes                      Becky S. [place], RN

## 2023-05-06 ENCOUNTER — READMISSION MANAGEMENT (OUTPATIENT)
Dept: CALL CENTER | Facility: HOSPITAL | Age: 38
End: 2023-05-06
Payer: COMMERCIAL

## 2023-05-06 VITALS
HEART RATE: 99 BPM | DIASTOLIC BLOOD PRESSURE: 82 MMHG | HEIGHT: 70 IN | BODY MASS INDEX: 19.33 KG/M2 | RESPIRATION RATE: 20 BRPM | SYSTOLIC BLOOD PRESSURE: 131 MMHG | TEMPERATURE: 98.4 F | OXYGEN SATURATION: 97 % | WEIGHT: 135 LBS

## 2023-05-06 LAB
ALBUMIN SERPL-MCNC: 4.4 G/DL (ref 3.5–5.2)
ALBUMIN/GLOB SERPL: 1.3 G/DL
ALP SERPL-CCNC: 63 U/L (ref 39–117)
ALT SERPL W P-5'-P-CCNC: 13 U/L (ref 1–41)
ANION GAP SERPL CALCULATED.3IONS-SCNC: 11.3 MMOL/L (ref 5–15)
AST SERPL-CCNC: 17 U/L (ref 1–40)
BASOPHILS # BLD AUTO: 0.03 10*3/MM3 (ref 0–0.2)
BASOPHILS NFR BLD AUTO: 0.2 % (ref 0–1.5)
BILIRUB SERPL-MCNC: 0.4 MG/DL (ref 0–1.2)
BUN SERPL-MCNC: 22 MG/DL (ref 6–20)
BUN/CREAT SERPL: 19.5 (ref 7–25)
CALCIUM SPEC-SCNC: 9.7 MG/DL (ref 8.6–10.5)
CHLORIDE SERPL-SCNC: 101 MMOL/L (ref 98–107)
CO2 SERPL-SCNC: 22.7 MMOL/L (ref 22–29)
CREAT SERPL-MCNC: 1.13 MG/DL (ref 0.76–1.27)
DEPRECATED RDW RBC AUTO: 43.5 FL (ref 37–54)
EGFRCR SERPLBLD CKD-EPI 2021: 86.4 ML/MIN/1.73
EOSINOPHIL # BLD AUTO: 0.01 10*3/MM3 (ref 0–0.4)
EOSINOPHIL NFR BLD AUTO: 0.1 % (ref 0.3–6.2)
ERYTHROCYTE [DISTWIDTH] IN BLOOD BY AUTOMATED COUNT: 14.6 % (ref 12.3–15.4)
GLOBULIN UR ELPH-MCNC: 3.5 GM/DL
GLUCOSE BLDC GLUCOMTR-MCNC: 122 MG/DL (ref 70–130)
GLUCOSE BLDC GLUCOMTR-MCNC: 95 MG/DL (ref 70–130)
GLUCOSE SERPL-MCNC: 86 MG/DL (ref 65–99)
HCT VFR BLD AUTO: 47.7 % (ref 37.5–51)
HGB BLD-MCNC: 16.5 G/DL (ref 13–17.7)
IMM GRANULOCYTES # BLD AUTO: 0.05 10*3/MM3 (ref 0–0.05)
IMM GRANULOCYTES NFR BLD AUTO: 0.4 % (ref 0–0.5)
LYMPHOCYTES # BLD AUTO: 1.9 10*3/MM3 (ref 0.7–3.1)
LYMPHOCYTES NFR BLD AUTO: 13.9 % (ref 19.6–45.3)
MCH RBC QN AUTO: 28.8 PG (ref 26.6–33)
MCHC RBC AUTO-ENTMCNC: 34.6 G/DL (ref 31.5–35.7)
MCV RBC AUTO: 83.4 FL (ref 79–97)
MONOCYTES # BLD AUTO: 1.13 10*3/MM3 (ref 0.1–0.9)
MONOCYTES NFR BLD AUTO: 8.3 % (ref 5–12)
NEUTROPHILS NFR BLD AUTO: 10.53 10*3/MM3 (ref 1.7–7)
NEUTROPHILS NFR BLD AUTO: 77.1 % (ref 42.7–76)
NRBC BLD AUTO-RTO: 0 /100 WBC (ref 0–0.2)
PLATELET # BLD AUTO: 265 10*3/MM3 (ref 140–450)
PMV BLD AUTO: 11.1 FL (ref 6–12)
POTASSIUM SERPL-SCNC: 4.2 MMOL/L (ref 3.5–5.2)
PROT SERPL-MCNC: 7.9 G/DL (ref 6–8.5)
RBC # BLD AUTO: 5.72 10*6/MM3 (ref 4.14–5.8)
SODIUM SERPL-SCNC: 135 MMOL/L (ref 136–145)
WBC NRBC COR # BLD: 13.65 10*3/MM3 (ref 3.4–10.8)

## 2023-05-06 PROCEDURE — 94664 DEMO&/EVAL PT USE INHALER: CPT

## 2023-05-06 PROCEDURE — 94799 UNLISTED PULMONARY SVC/PX: CPT

## 2023-05-06 PROCEDURE — 63710000001 PREDNISONE PER 1 MG: Performed by: NURSE PRACTITIONER

## 2023-05-06 PROCEDURE — 82948 REAGENT STRIP/BLOOD GLUCOSE: CPT

## 2023-05-06 PROCEDURE — 80053 COMPREHEN METABOLIC PANEL: CPT | Performed by: INTERNAL MEDICINE

## 2023-05-06 PROCEDURE — 94761 N-INVAS EAR/PLS OXIMETRY MLT: CPT

## 2023-05-06 PROCEDURE — 25010000002 THIAMINE PER 100 MG: Performed by: NURSE PRACTITIONER

## 2023-05-06 PROCEDURE — 85025 COMPLETE CBC W/AUTO DIFF WBC: CPT | Performed by: INTERNAL MEDICINE

## 2023-05-06 RX ORDER — PREDNISONE 20 MG/1
20 TABLET ORAL DAILY
Qty: 7 TABLET | Refills: 0 | Status: SHIPPED | OUTPATIENT
Start: 2023-05-06

## 2023-05-06 RX ADMIN — Medication 1 MG: at 09:34

## 2023-05-06 RX ADMIN — MULTIPLE VITAMINS W/ MINERALS TAB 1 TABLET: TAB at 09:34

## 2023-05-06 RX ADMIN — CETIRIZINE HYDROCHLORIDE 10 MG: 10 TABLET ORAL at 09:34

## 2023-05-06 RX ADMIN — AMLODIPINE BESYLATE 10 MG: 5 TABLET ORAL at 09:34

## 2023-05-06 RX ADMIN — GUAIFENESIN 600 MG: 600 TABLET, EXTENDED RELEASE ORAL at 09:34

## 2023-05-06 RX ADMIN — IPRATROPIUM BROMIDE AND ALBUTEROL SULFATE 3 ML: .5; 3 SOLUTION RESPIRATORY (INHALATION) at 12:50

## 2023-05-06 RX ADMIN — THIAMINE HYDROCHLORIDE 200 MG: 100 INJECTION, SOLUTION INTRAMUSCULAR; INTRAVENOUS at 06:44

## 2023-05-06 RX ADMIN — IPRATROPIUM BROMIDE AND ALBUTEROL SULFATE 3 ML: .5; 3 SOLUTION RESPIRATORY (INHALATION) at 07:51

## 2023-05-06 RX ADMIN — Medication 10 ML: at 09:34

## 2023-05-06 RX ADMIN — PREDNISONE 40 MG: 20 TABLET ORAL at 09:34

## 2023-05-06 NOTE — PROGRESS NOTES
"                                              LOS: 2 days   Patient Care Team:  Nakul Paul MD as PCP - General (Family Medicine)    Chief Complaint:  F/up     Subjective   Interval History  I reviewed the admission note, progress notes, PMH, PSH, Family hx, social history, imagings and prior records on this admission, summarized the finding in my note and formulated a transition of care plan.    He feels better.  He denies shortness of breath and cough.  We will [place].    REVIEW OF SYSTEMS:   CARDIOVASCULAR: No chest pain, chest pressure or chest discomfort. No palpitations or edema.    GASTROINTESTINAL: No anorexia, nausea, vomiting or diarrhea. No abdominal pain.  CONSTITUTIONAL: No fever or chills.     Ventilator/Non-Invasive Ventilation Settings (From admission, onward)    None                Physical Exam:     Vital Signs  Temp:  [97.9 °F (36.6 °C)-98.4 °F (36.9 °C)] 98.4 °F (36.9 °C)  Heart Rate:  [] 104  Resp:  [16-20] 20  BP: (109-130)/(78-89) 109/89    Intake/Output Summary (Last 24 hours) at 5/6/2023 0924  Last data filed at 5/6/2023 0644  Gross per 24 hour   Intake 740 ml   Output 400 ml   Net 340 ml     Flowsheet Rows    Flowsheet Row First Filed Value   Admission Height 177.8 cm (70\") Documented at 05/04/2023 2117   Admission Weight 64.4 kg (142 lb) Documented at 05/04/2023 2117          PPE used per hospital policy    General Appearance:   Alert, cooperative, in no acute distress   ENMT:  Mallampati score 3, moist mucous membrane   Eyes:  Pupils equal and reactive to light. EOMI   Neck:    Trachea midline. No thyromegaly.   Lungs:    Equal but slightly diminished air entry overall.  Very faint expiratory wheezing.    Heart:   Regular rhythm and normal rate, normal S1 and S2, no         murmur   Skin:   No rash or ecchymosis   Abdomen:    Soft. No tenderness. No HSM.   Neuro/psych:  Conscious, alert, oriented x3. Strength 5/5 in upper and lower  ext.  Appropriate mood and affect "   Extremities:  No cyanosis, clubbing or edema.  Warm extremities and well-perfused          Results Review:        Results from last 7 days   Lab Units 05/06/23  0737 05/05/23  0640 05/04/23  2159   SODIUM mmol/L 135* 133* 140   POTASSIUM mmol/L 4.2 4.5 4.2   CHLORIDE mmol/L 101 99 105   CO2 mmol/L 22.7 20.6* 20.7*   BUN mg/dL 22* 15 9   CREATININE mg/dL 1.13 1.04 1.20   GLUCOSE mg/dL 86 130* 147*   CALCIUM mg/dL 9.7 9.8 10.6*         Results from last 7 days   Lab Units 05/06/23  0737 05/05/23  0640 05/04/23  2159   WBC 10*3/mm3 13.65* 8.49 8.51   HEMOGLOBIN g/dL 16.5 15.9 17.1   HEMATOCRIT % 47.7 47.3 49.8   PLATELETS 10*3/mm3 265 257 248               Results from last 7 days   Lab Units 05/04/23 2159   D DIMER QUANT MCGFEU/mL <0.27                     I reviewed the patient's new clinical results.        Medication Review:   amLODIPine, 10 mg, Oral, Daily  budesonide-formoterol, 2 puff, Inhalation, BID - RT  cetirizine, 10 mg, Oral, Daily  fluticasone, 2 spray, Nasal, Daily  folic acid, 1 mg, Oral, Daily  guaiFENesin, 600 mg, Oral, Q12H  insulin lispro, 0-7 Units, Subcutaneous, TID With Meals  ipratropium-albuterol, 3 mL, Nebulization, 4x Daily - RT  montelukast, 10 mg, Oral, Nightly  multivitamin with minerals, 1 tablet, Oral, Daily  predniSONE, 40 mg, Oral, Daily With Breakfast  sodium chloride, 10 mL, Intravenous, Q12H  thiamine (B-1) IV, 200 mg, Intravenous, Q8H   Followed by  [START ON 5/10/2023] thiamine, 100 mg, Oral, Daily  tiotropium bromide monohydrate, 2 puff, Inhalation, Daily - RT             Diagnostic imaging:  I personally and independently reviewed the following images:  CXR 5/4/2023: Flattened diaphragms.  Hyperinflation.    Assessment   1. Severe asthma (FEV1 46% on 10/17/2022) with current exacerbation.  No emphysema on CT chest 9/7/2021  2. Acute hypoxic respiratory failure, secondary to above.  Resolved.  3. Prior eosinophilia: October and July 2022  4. Alcohol and marijuana abuse    All  problems new to me    Plan     · Prednisone 40 mg daily, can continue to a total of 7 days steroid therapy  · Singulair  · DuoNeb 4 times a day and Symbicort while hospitalized.  On DC he will require long-acting treatment such as Symbicort, Dulera or Trelegy.  It does not look like he has any.  · Counseled against smoking.    Okay to DC.  Follow-up with his outpatient pulmonologist at [place].        Miguelangel Lowery MD  05/06/23  09:24 EDT            This note was dictated utilizing Dragon dictation

## 2023-05-06 NOTE — PLAN OF CARE
Goal Outcome Evaluation:  Plan of Care Reviewed With: patient      Progress: improving  Outcome Evaluation: All needs met. Rested well. Using urinal. Poor appetite, but drinking good amount of fluids. VSS. Oriented x 4. Remains on RA. SR on the monitor. CIWA scores less than 3 tonight. Tylenol PRN for headache. Blood glucose monitoring.

## 2023-05-06 NOTE — PLAN OF CARE
Goal Outcome Evaluation:  Plan of Care Reviewed With: patient        Progress: improving  Outcome Evaluation: VSS, no complaints of pain this shift.  Poor appetite.  Alert and oriented x4.  Pt remains on room air, sinus on the monitor.  Plan is for pt to be discharged home this shift and to follow up with pulmonologist.

## 2023-05-06 NOTE — OUTREACH NOTE
Prep Survey    Flowsheet Row Responses   [facility] facility patient discharged from? [place]   Is LACE score < 7 ? No   Eligibility Not Eligible   What are the reasons patient is not eligible? Other  [substance abuse]   Does the patient have one of the following disease processes/diagnoses(primary or secondary)? Other   Prep survey completed? Yes          Cindy MEDINA - Registered Nurse

## 2023-05-08 NOTE — PAYOR COMM NOTE
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 Detected    Influenza A PCR Not Detected Not Detected    Influenza B PCR Not Detected Not Detected    Parainfluenza Virus 1 Not Detected Not Detected    Parainfluenza Virus 2 Not Detected Not Detected    Parainfluenza Virus 3 Not Detected Not Detected    Parainfluenza Virus 4 Not Detected Not Detected    RSV, PCR Not Detected Not Detected    Bordetella pertussis pcr Not Detected Not Detected    Bordetella parapertussis PCR Not Detected Not Detected    Chlamydophila pneumoniae PCR Not Detected Not Detected    Mycoplasma pneumo by PCR Not Detected Not Detected   ECG 12 Lead Dyspnea    Collection Time: 05/04/23 10:02 PM   Result Value Ref Range    QT Interval 324 ms       Ordered the above labs and independently interpreted results. My findings will be discussed in the medical decision making section below        RADIOLOGY  XR Chest 1 View    Result Date: 5/4/2023  SINGLE VIEW OF THE CHEST  HISTORY: Shortness of breath  COMPARISON: None available.  FINDINGS: Heart size is within normal limits. No pneumothorax or pleural effusion is seen. No acute infiltrates are identified. Images do suggest some  background emphysematous changes.      No acute findings.  This report was finalized on 5/4/2023 10:24 PM by Dr. Mary Blake M.D.        I ordered and independently reviewed the above noted radiographic studies.      I viewed images of chest x-ray which showed hyperaeration, no active disease per my independent interpretation.    See radiologist's dictation for official interpretation.             PROCEDURES  Procedures          MEDICATIONS GIVEN IN ER  Medications   sodium chloride 0.9 % flush 10 mL (has no administration in time range)   sodium chloride 0.9 % flush 10 mL (has no administration in time range)   sodium chloride 0.9 % infusion 40 mL (has no administration in time range)   nitroglycerin (NITROSTAT) SL tablet 0.4 mg (has no administration in time range)   predniSONE (DELTASONE) tablet 40 mg (has no administration in time range)   insulin lispro (HUMALOG/ADMELOG) injection 0-7 Units (has no administration in time range)   dextrose (GLUTOSE) oral gel 15 g (has no administration in time range)   dextrose (D50W) (25 g/50 mL) IV injection 25 g (has no administration in time range)   glucagon (GLUCAGEN) injection 1 mg (has no administration in time range)   guaiFENesin-dextromethorphan (ROBITUSSIN DM) 100-10 MG/5ML syrup 10 mL (has no administration in time range)   guaiFENesin (MUCINEX) 12 hr tablet 600 mg (has no administration in time range)   ipratropium-albuterol (DUO-NEB) nebulizer solution 3 mL (3 mL Nebulization Given 5/4/23 2201)   magnesium sulfate 2g/50 mL (PREMIX) infusion (0 g Intravenous Stopped 5/4/23 2315)   methylPREDNISolone sodium succinate (SOLU-Medrol) injection 80 mg (80 mg Intravenous Given 5/4/23 2226)               MEDICAL DECISION MAKING, PROGRESS, and CONSULTS    All labs have been independently reviewed by me.  All radiology studies have been reviewed by me and I have also reviewed the radiology report.   EKG's independently viewed and interpreted by me.  Discussion below  represents my analysis of pertinent findings related to patient's condition, differential diagnosis, treatment plan and final disposition.      Additional sources:  - Discussed/ obtained information from independent historians: Cousin at bedside    - External (non-ED) record review: Please see documented above    - Chronic or social conditions impacting care: Chronic asthma/COPD    - Shared decision making: I discussed ED evaluation and treatment plan with patient who is in agreement.  Patient hypoxic and requiring 4 L of oxygen to maintain O2 sats.  Still with wheezing despite earlier treatment at outlying ED ([facility]).  Will admit to the hospital for treatment of asthma/COPD.      Orders placed during this visit:  Orders Placed This Encounter   Procedures   • Respiratory Panel PCR w/COVID-19(SARS-CoV-2) PAMELA/ROSALINDA/JASON/PAD/COR/MAD/VALERIA In-House, NP Swab in UTM/VTM, 3-4 HR TAT - Swab, Nasopharynx   • XR Chest 1 View   • Basic Metabolic Panel   • D-dimer, Quantitative   • Magnesium   • CBC Auto Differential   • Manual Differential   • Potassium   • Magnesium   • High Sensitivity Troponin T   • Blood Gas, Arterial -   • CBC (No Diff)   • Basic Metabolic Panel   • Diet: Regular/House Diet; Texture: Regular Texture (IDDSI 7); Fluid Consistency: Thin (IDDSI 0)   • Reason for COPD Admission: Natural Disease Progression   • Tobacco Cessation Education   • Respiratory Treatment Education (MDI / Spacer / Nebulizer)   • COPD Education   • Vital Signs   • Intake & Output   • Weigh Patient   • Oral Care   • Place Sequential Compression Device   • Maintain Sequential Compression Device   • Telemetry - Maintain IV Access   • May Be Off Telemetry for Tests   • Cardiac Monitoring   • Pulse Oximetry, Continuous   • Up With Assistance   • Daily Weights   • Cough / Deep Breathe   • Notify Provider if POC Glucose Greater Than 180 x2   • Code Status and Medical Interventions:   • LHA (on-call MD unless specified) Details   •  Inpatient Pulmonology Consult   • Document Pulse Oximetry - On Room Air / Home O2 Level   • Oxygen Therapy- Nasal Cannula; Titrate for SPO2: 90% - 95%   • Incentive Spirometry   • POC Glucose TID AC   • ECG 12 Lead Dyspnea   • ECG 12 Lead Chest Pain   • Insert Peripheral IV   • Inpatient Admission   • CBC & Differential           Differential diagnosis:    Please see as documented below in ED course      Independent interpretation of labs, radiology studies, and discussions with consultants:  ED Course as of 05/04/23 2340   Thu May 04, 2023   2156 ECT-10-iwed-old male with history of asthma, COPD presents with increased shortness of breath despite being seen earlier today at [facility] emergency department.    On exam he is requiring supplemental oxygen by nasal cannula.  Breath sounds are markedly diminished.    Assessment-most likely asthma exacerbation, perhaps COPD playing some role.  We will go ahead and check for pulmonary infection, pneumonia.  Pulmonary embolism seems unlikely but will check D-dimer.  Will check EKG to look for dysrhythmia or heart problems.    Patient will likely require admission as he was seen earlier and is now requiring oxygen.  We will go ahead and give DuoNeb treatment, steroids and magnesium. [DB]   2219 EKG independently interpreted by me  Time 10:02 PM    Sinus tachycardia 114  P waves-inverted suggesting atrial enlargement  QRS-LVH, normal axis  ST, T wave-nonspecific change    Not significant change when compared to 11/2022 [DB]   2310 Chest x-ray independently interpreted by me shows hyperinflation but no obvious acute disease.    Official interpretation by Dr. Blake is in agreement [DB]   2311 Labs reviewed are fairly benign with a  normal white count and hemoglobin.    Chemistry is fairly unremarkable without significant renal failure or electrolyte disturbance.  There are some mild hypercalcemia of 10.6.  Mild metabolic acidosis with bicarb of 21.  D-dimer normal would go  against pulmonary embolism.  Viral panel negative. [DB]   2312 On repeat assessment patient is improved but still requiring oxygen to maintain sats greater than 90%.  Will admit to the hospital for asthma exacerbation and hypoxic respiratory failure. [DB]   2313 I spoke with Lucero from [facility] who will admit on behalf of Dr. John Harris. [DB]      ED Course User Index  [DB] Jona Groves MD             I used full protective equipment while examining this patient.  This includes face mask, gloves and protective eyewear.  I washed my hands before entering the room and immediately upon leaving the room    DIAGNOSIS  Final diagnoses:   Acute respiratory failure with hypoxia   Severe persistent asthma with exacerbation         DISPOSITION  Admission            Latest Documented Vital Signs:  As of 23:40 EDT  BP- 136/97 HR- 94 Temp- 97.1 °F (36.2 °C) (Tympanic) O2 sat- 98%              --    Please note that portions of this were completed with a voice recognition program.       Note Disclaimer: At [facility], we believe that sharing information builds trust and better relationships. You are receiving this note because you are receiving care at [facility] or recently visited. It is possible you will see health information before a provider has talked with you about it. This kind of information can be easy to misunderstand. To help you fully understand what it means for your health, we urge you to discuss this note with your provider.           Jona Groves MD  05/04/23 2340      Electronically signed by Jona Groves MD at 05/04/23 2340     Katie Jones, RN at 05/05/23 0019          Nursing report ED to floor  Craig Bolanos  37 y.o.  male    HPI :   Chief Complaint   Patient presents with   • Shortness of Breath       Admitting doctor:   John Harris DO    Admitting diagnosis:   The primary encounter diagnosis was Acute respiratory failure with hypoxia. A diagnosis of Severe persistent asthma with  exacerbation was also pertinent to this visit.    Code status:   Current Code Status       Date Active Code Status Order ID Comments User Context       5/4/2023 2335 CPR (Attempt to Resuscitate) [id number]  Bee Butts APRN ED        Question Answer    Code Status (Patient has no pulse and is not breathing) CPR (Attempt to Resuscitate)    Medical Interventions (Patient has pulse or is breathing) Full Support                    Allergies:   Patient has no known allergies.    Isolation:   No active isolations    Intake and Output    Intake/Output Summary (Last 24 hours) at 5/5/2023 0019  Last data filed at 5/4/2023 2315  Gross per 24 hour   Intake 50 ml   Output --   Net 50 ml       Weight:       05/04/23 2117   Weight: 64.4 kg (142 lb)       Most recent vitals:   Vitals:    05/04/23 2321 05/04/23 2322 05/04/23 2331 05/04/23 2332   BP:   136/97    Pulse: 92 97  94   Resp:       Temp:       TempSrc:       SpO2: 96% 96%  98%   Weight:       Height:           Active LDAs/IV Access:   Lines, Drains & Airways       Active LDAs       Name Placement date Placement time Site Days    Peripheral IV 05/04/23 2224 Right Antecubital 05/04/23 2224  Antecubital  less than 1                    Labs (abnormal labs have a star):   Labs Reviewed   BASIC METABOLIC PANEL - Abnormal; Notable for the following components:       Result Value    Glucose 147 (*)     CO2 20.7 (*)     Calcium 10.6 (*)     All other components within normal limits    Narrative:     GFR Normal >60  Chronic Kidney Disease <60  Kidney Failure <15     CBC WITH AUTO DIFFERENTIAL - Abnormal; Notable for the following components:    RBC 5.91 (*)     All other components within normal limits   MANUAL DIFFERENTIAL - Abnormal; Notable for the following components:    Neutrophil % 89.0 (*)     Lymphocyte % 10.0 (*)     Monocyte % 1.0 (*)     Neutrophils Absolute 7.57 (*)     Monocytes Absolute 0.09 (*)     All other components within normal limits   RESPIRATORY  "PANEL PCR W/ COVID-19 (SARS-COV-2) PAMELA/ROSALINDA/JASON/PAD/COR/MAD/VALERIA IN-HOUSE, NP SWAB IN UT/VTP, 3-4 HR TAT - Normal    Narrative:     In the setting of a positive respiratory panel with a viral infection PLUS a negative procalcitonin without other underlying concern for bacterial infection, consider observing off antibiotics or discontinuation of antibiotics and continue supportive care. If the respiratory panel is positive for atypical bacterial infection (Bordetella pertussis, Chlamydophila pneumoniae, or Mycoplasma pneumoniae), consider antibiotic de-escalation to target atypical bacterial infection.   D-DIMER, QUANTITATIVE - Normal    Narrative:     According to the assay 's published package insert, a normal (<0.50 MCGFEU/mL) D-dimer result in conjunction with a non-high clinical probability assessment, excludes deep vein thrombosis (DVT) and pulmonary embolism (PE) with high sensitivity.    D-dimer values increase with age and this can make VTE exclusion of an older population difficult. To address this, the [employer], based on best available evidence and recent guidelines, recommends that clinicians use age-adjusted D-dimer thresholds in patients greater than 50 years of age with: a) a low probability of PE who do not meet all Pulmonary Embolism Rule Out Criteria, or b) in those with intermediate probability of PE.   The formula for an age-adjusted D-dimer cut-off is \"age/100\".  For example, a 60 year old patient would have an age-adjusted cut-off of 0.60 MCGFEU/mL and an 80 year old 0.80 MCGFEU/mL.   MAGNESIUM - Normal   CBC (NO DIFF)   BASIC METABOLIC PANEL   POCT GLUCOSE FINGERSTICK   POCT GLUCOSE FINGERSTICK   POCT GLUCOSE FINGERSTICK   CBC AND DIFFERENTIAL    Narrative:     The following orders were created for panel order CBC & Differential.  Procedure                               Abnormality         Status                     ---------                               " -----------         ------                     CBC Auto Differential[[id number]]        Abnormal            Final result                 Please view results for these tests on the individual orders.       EKG:   ECG 12 Lead Dyspnea   Preliminary Result   HEART RATE= 114  bpm   RR Interval= 526  ms   MI Interval= 136  ms   P Horizontal Axis= -24  deg   P Front Axis= 82  deg   QRSD Interval= 88  ms   QT Interval= 324  ms   QRS Axis= 61  deg   T Wave Axis= 28  deg   - ABNORMAL ECG -   Sinus tachycardia   Biatrial enlargement   Probable left ventricular hypertrophy   Electronically Signed By:    Date and Time of Study: 2023-05-04 22:02:21          Meds given in ED:   Medications   sodium chloride 0.9 % flush 10 mL (has no administration in time range)   sodium chloride 0.9 % flush 10 mL (has no administration in time range)   sodium chloride 0.9 % infusion 40 mL (has no administration in time range)   nitroglycerin (NITROSTAT) SL tablet 0.4 mg (has no administration in time range)   predniSONE (DELTASONE) tablet 40 mg (has no administration in time range)   insulin lispro (HUMALOG/ADMELOG) injection 0-7 Units (has no administration in time range)   dextrose (GLUTOSE) oral gel 15 g (has no administration in time range)   dextrose (D50W) (25 g/50 mL) IV injection 25 g (has no administration in time range)   glucagon (GLUCAGEN) injection 1 mg (has no administration in time range)   guaiFENesin-dextromethorphan (ROBITUSSIN DM) 100-10 MG/5ML syrup 10 mL (has no administration in time range)   guaiFENesin (MUCINEX) 12 hr tablet 600 mg (has no administration in time range)   ipratropium-albuterol (DUO-NEB) nebulizer solution 3 mL (3 mL Nebulization Given 5/4/23 2201)   magnesium sulfate 2g/50 mL (PREMIX) infusion (0 g Intravenous Stopped 5/4/23 2315)   methylPREDNISolone sodium succinate (SOLU-Medrol) injection 80 mg (80 mg Intravenous Given 5/4/23 2226)       Imaging results:  XR Chest 1 View    Result Date: 5/4/2023  No acute  findings.  This report was finalized on 2023 10:24 PM by Dr. Mary Blake M.D.       Ambulatory status:   - ad marline      Social issues:   Social History     Socioeconomic History   • Marital status: Single   Tobacco Use   • Smoking status: Former     Packs/day: 1.00     Years: 10.00     Pack years: 10.00     Types: Cigarettes     Quit date: 2013     Years since quittin.9   • Smokeless tobacco: Never   Vaping Use   • Vaping Use: Never used   Substance and Sexual Activity   • Alcohol use: Yes     Comment: Occasional   • Drug use: Yes     Types: Marijuana     Comment: Daily   • Sexual activity: Defer       NIH Stroke Scale:         Katie Jones RN  23 00:19 EDT          Electronically signed by Katie Jones RN at 23 0019       Oxygen Therapy (last 3 days) before discharge     Date/Time SpO2 Device (Oxygen Therapy) Flow (L/min) Oxygen Concentration (%) ETCO2 (mmHg)    23 1255 97 -- -- -- --    23 1250 90 room air -- -- --    23 0938 -- room air -- -- --    23 0756 97 -- -- -- --    23 0752 91 room air -- -- --    23 2349 92 room air -- -- --    23 2034 -- room air -- -- --    23 92 room air -- -- --    23 1522 96 room air -- -- --    23 1458 -- room air -- -- --    23 1258 92 room air -- -- --    23 1252 93 room air -- -- --    23 0914 -- humidified;high-flow nasal cannula 6 -- --    23 0730 94 -- -- -- --    23 0729 91 humidified;high-flow nasal cannula 6 -- --    23 0724 96 humidified;high-flow nasal cannula 6 -- --    23 0136 -- humidified;high-flow nasal cannula 6 -- --    23 0118 92 nasal cannula 4 -- --    23 -- nasal cannula 4 -- --    23 98 -- -- -- --    23 96 -- -- -- --    23 96 -- -- -- --    23 97 -- -- -- --    23 90 -- -- -- --    23 97 -- -- -- --    23 96 -- -- -- --     05/04/23 2313 99 -- -- -- --    05/04/23 2309 95 -- -- -- --    05/04/23 2307 98 -- -- -- --    05/04/23 2305 98 -- -- -- --    05/04/23 2304 100 -- -- -- --    05/04/23 2303 100 -- -- -- --    05/04/23 2301 100 -- -- -- --    05/04/23 2211 100 -- 4 -- --    05/04/23 2200 98 -- -- -- --    05/04/23 2155 77 -- 10 -- --    05/04/23 2126 91 -- -- -- --    05/04/23 2115 94 -- -- -- --        Intake & Output (last 3 days)       05/05 0701 05/06 0700 05/06 0701  05/07 0700 05/07 0701  05/08 0700 05/08 0701  05/09 0700    P.O. 740       I.V. (mL/kg)        Total Intake(mL/kg) 740 (12.1)       Urine (mL/kg/hr) 400 (0.3)       Total Output 400       Net +340                    Lines, Drains & Airways     Active LDAs     None          Inactive LDAs     Name Placement date Placement time Removal date Removal time Site Days    [REMOVED] Peripheral IV 05/04/23 2224 Right Antecubital 05/04/23 2224 05/06/23  1207  Antecubital  1                  Medication Administration Report for Craig Bolanos as of 05/08/23 1153   Legend:    Given Hold Not Given Due Canceled Entry Other Actions    Time Time (Time) Time  Time-Action       Discontinued     Completed     Future     MAR Hold     Linked           Medications 05/04/23 05/05/23 05/06/23   Completed Medications    ipratropium-albuterol (DUO-NEB) nebulizer solution 3 mL  Dose: 3 mL  Freq: Once Route: NEBULIZATION  Start: 05/04/23 2157   End: 05/04/23 2201   Admin Instructions:   Include Respiratory Treatment Education    2201-Given               magnesium sulfate 2g/50 mL (PREMIX) infusion  Dose: 2 g  Freq: Once Route: IV  Start: 05/04/23 2157   End: 05/04/23 2315 2225-New Bag     2315-Stopped              methylPREDNISolone sodium succinate (SOLU-Medrol) injection 80 mg  Dose: 80 mg  Freq: Once Route: IV  Start: 05/04/23 2200   End: 05/04/23 2226   Admin Instructions:   Caution: Look alike/sound alike drug alert    2226-Given              Discontinued Medications  Medications  05/04/23 05/05/23 05/06/23       acetaminophen (TYLENOL) tablet 650 mg  Dose: 650 mg  Freq: Every 6 Hours PRN Route: PO  PRN Reason: Mild Pain  Start: 05/05/23 0215   End: 05/06/23 1728   Admin Instructions:   Based on patient request - if ordered for moderate or severe pain, provider allows for administration of a medication prescribed for a lower pain scale.  Do not exceed 4 grams of acetaminophen in a 24 hr period. Max dose of 2gm for AST/ALT greater than 120 units/L    If given for fever, use fever parameter: fever greater than 100.4 °F.    If given for pain, use the following pain scale:   Mild Pain = Pain Score of 1-3, CPOT 1-2  Moderate Pain = Pain Score of 4-6, CPOT 3-4  Severe Pain = Pain Score of 7-10, CPOT 5-8     0243-Given     0914-Given     1518-Given       2218-Given [C]          (0644)-Not Given             albuterol (PROVENTIL) nebulizer solution 0.083% 2.5 mg/3mL  Dose: 2.5 mg  Freq: Every 4 Hours PRN Route: NEBULIZATION  PRN Reasons: Shortness of Air,Wheezing  Start: 05/05/23 0118   End: 05/06/23 1728   Admin Instructions:   Include Respiratory Treatment Education          amLODIPine (NORVASC) tablet 10 mg  Dose: 10 mg  Freq: Daily Route: PO  Start: 05/05/23 0900   End: 05/06/23 1728   Admin Instructions:   Caution: Look alike/sound alike drug alert. Avoid grapefruit juice.     0914-Given          0934-Given             budesonide-formoterol (SYMBICORT) 160-4.5 MCG/ACT inhaler 2 puff  Dose: 2 puff  Freq: 2 Times Daily - RT Route: IN  Start: 05/05/23 0315   End: 05/06/23 1728   Admin Instructions:    Shake well.  Rinse mouth after use, do not swallow water.  Send aerosols to pharmacy in ziplock bag for proper disposal.     (0332)-Not Given [C]     0729-Given     (1951)-Not Given        (0752)-Not Given             budesonide-formoterol (SYMBICORT) 80-4.5 MCG/ACT inhaler 2 puff  Dose: 2 puff  Freq: 2 Times Daily - RT Route: IN  Start: 05/05/23 0230   End: 05/05/23 0218   Admin Instructions:    Include Respiratory Treatment Education   Shake well.  Rinse mouth after use, do not swallow water.  Send aerosols to pharmacy in ziplock bag for proper disposal.          cetirizine (zyrTEC) tablet 10 mg  Dose: 10 mg  Freq: Daily Route: PO  Start: 05/05/23 0900   End: 05/06/23 1728     0915-Given          0934-Given             dextrose (D50W) (25 g/50 mL) IV injection 25 g  Dose: 25 g  Freq: Every 15 Minutes PRN Route: IV  PRN Reason: Low Blood Sugar  PRN Comment: Blood Sugar Less Than 70, Patient Has IV Access - Unresponsive, NPO or Unable To Safely Swallow  Start: 05/04/23 2334   End: 05/06/23 1728          dextrose (GLUTOSE) oral gel 15 g  Dose: 15 g  Freq: Every 15 Minutes PRN Route: PO  PRN Reason: Low Blood Sugar  PRN Comment: Blood Sugar Less Than 70, Patient Alert, Is Not NPO & Can Safely Swallow  Start: 05/04/23 2334   End: 05/06/23 1728          fluticasone (FLONASE) 50 MCG/ACT nasal spray 2 spray  Dose: 2 spray  Freq: Daily Route: NA  Start: 05/05/23 0900   End: 05/06/23 1728     0915-Given          (0934)-Not Given             folic acid (FOLVITE) tablet 1 mg  Dose: 1 mg  Freq: Daily Route: PO  Start: 05/05/23 0900   End: 05/06/23 1728     0915-Given          0934-Given             glucagon (GLUCAGEN) injection 1 mg  Dose: 1 mg  Freq: Every 15 Minutes PRN Route: SC  PRN Reason: Low Blood Sugar  PRN Comment: Blood Glucose Less Than 70 - Patient Without IV Access - Unresponsive, NPO or Unable To Safely Swallow  Start: 05/04/23 2334   End: 05/06/23 1728   Admin Instructions:   Reconstitute powder for injection by adding 1 mL of -supplied sterile diluent or sterile water for injection to a vial containing 1 mg of the drug, to provide solutions containing 1 mg/mL. Shake vial gently to dissolve.          guaiFENesin (MUCINEX) 12 hr tablet 600 mg  Dose: 600 mg  Freq: Every 12 Hours Scheduled Route: PO  Start: 05/05/23 0230   End: 05/06/23 1728   Admin Instructions:   Caution: Look alike/sound  alike drug alert               Do not crush, split, or chew.     0243-Given     0915-Given     2034-Given        0934-Given             guaiFENesin-dextromethorphan (ROBITUSSIN DM) 100-10 MG/5ML syrup 10 mL  Dose: 10 mL  Freq: Every 6 Hours PRN Route: PO  PRN Reason: Cough  Start: 05/04/23 2334   End: 05/06/23 1728   Admin Instructions:   Caution: Look alike/sound alike drug alert          insulin lispro (HUMALOG/ADMELOG) injection 0-7 Units  Dose: 0-7 Units  Freq: 3 Times Daily With Meals Route: SC  Start: 05/05/23 0800   End: 05/06/23 1728   Admin Instructions:   Correction - Low Dose    Blood Glucose 150-199 - 2 units  Blood Glucose 200-249 - 3 units  Blood Glucose 250-299 - 4 units  Blood Glucose 300-349 - 5 units  Blood Glucose 350-400 - 6 units  Blood Glucose Greater Than 400 - 7 units & Call Provider   Caution: Look alike/sound alike drug alert     (0807)-Not Given     (1225)-Not Given     (1729)-Not Given        (0737)-Not Given     (1158)-Not Given            ipratropium-albuterol (DUO-NEB) nebulizer solution 3 mL  Dose: 3 mL  Freq: Every 2 Hours PRN Route: NEBULIZATION  PRN Reason: Shortness of Air  Start: 05/05/23 0915   End: 05/06/23 1728          ipratropium-albuterol (DUO-NEB) nebulizer solution 3 mL  Dose: 3 mL  Freq: 4 Times Daily - RT Route: NEBULIZATION  Start: 05/05/23 1230   End: 05/06/23 1728     (1155)-Not Given     1522-Given     1951-Given        0751-Given     1250-Given            LORazepam (ATIVAN) tablet 1 mg  Dose: 1 mg  Freq: Every 2 Hours PRN Route: PO  PRN Reason: Withdrawal  PRN Comment: For CIWA-Ar 8-10  Start: 05/05/23 0119   End: 05/06/23 1728   Admin Instructions:   Reassess 2 Hours After Administration.  For Use in NON Critical Care Units.   Caution: Look alike/sound alike drug alert         Or  LORazepam (ATIVAN) injection 1 mg  Dose: 1 mg  Freq: Every 2 Hours PRN Route: IV  PRN Reason: Withdrawal  PRN Comment: For EDIL-Ar 8-10  Start: 05/05/23 0119   End: 05/06/23 1728   Admin  Instructions:   Reassess 2 Hours After Administration.  For Use in NON Critical Care Units.   Caution: Look alike/sound alike drug alert. Dilute 1:1 with normal saline.         Or  LORazepam (ATIVAN) tablet 2 mg  Dose: 2 mg  Freq: Every 1 Hour PRN Route: PO  PRN Reason: Withdrawal  PRN Comment: For CIWA-Ar 11-15  Start: 05/05/23 0119   End: 05/06/23 1728   Admin Instructions:   Reassess 1 Hour After Administration.  For Use in NON Critical Care Units.   Caution: Look alike/sound alike drug alert         Or  LORazepam (ATIVAN) injection 2 mg  Dose: 2 mg  Freq: Every 1 Hour PRN Route: IV  PRN Reason: Withdrawal  PRN Comment: For CIWA-Ar 11-15  Start: 05/05/23 0119   End: 05/06/23 1728   Admin Instructions:   Reassess 1 Hour After Administration.  For Use in NON Critical Care Units.   Caution: Look alike/sound alike drug alert. Dilute 1:1 with normal saline.         Or  LORazepam (ATIVAN) injection 2 mg  Dose: 2 mg  Freq: Every 15 Minutes PRN Route: IV  PRN Reason: Anxiety  PRN Comment: For CIWA-Ar Greater Than 15.  Repeat Dose in 15 Minutes if CIWA-Ar Does Not Decrease  Start: 05/05/23 0119   End: 05/06/23 1728   Admin Instructions:   Reassess 15 Minutes After Each Administration. For Use in NON Critical Care Units.  If CIWA-Ar Does Not Decrease Contact Provider To Discuss Transfer to Higher Level of Care.   Caution: Look alike/sound alike drug alert. Dilute 1:1 with normal saline.         Or  LORazepam (ATIVAN) injection 2 mg  Dose: 2 mg  Freq: Every 15 Minutes PRN Route: IM  PRN Reason: Withdrawal  PRN Comment: If Unable to Administer IV - For CIWA-Ar Greater Than 15.  Repeat Dose in 15 Minutes if CIWA-Ar Does Not Decrease  Start: 05/05/23 0119   End: 05/06/23 1728   Admin Instructions:   Reassess 15 Minutes After Each Administration. For Use in NON Critical Care Units.  If CIWA-Ar Does Not Decrease Contact Provider To Discuss Transfer to Higher Level of Care.   Caution: Look alike/sound alike drug alert. Dilute 1:1  with normal saline.          montelukast (SINGULAIR) tablet 10 mg  Dose: 10 mg  Freq: Nightly Route: PO  Start: 05/05/23 2100   End: 05/06/23 1728     2034-Given              multivitamin with minerals 1 tablet  Dose: 1 tablet  Freq: Daily Route: PO  Start: 05/05/23 0900   End: 05/06/23 1728   Admin Instructions:        0915-Given          0934-Given             nitroglycerin (NITROSTAT) SL tablet 0.4 mg  Dose: 0.4 mg  Freq: Every 5 Minutes PRN Route: SL  PRN Reason: Chest Pain  PRN Comment: Only if SBP Greater Than 100  Start: 05/04/23 2333   End: 05/06/23 1728   Admin Instructions:   If Pain Unrelieved After 3 Doses Notify MD          predniSONE (DELTASONE) tablet 40 mg  Dose: 40 mg  Freq: Daily With Breakfast Route: PO  Start: 05/05/23 0800   End: 05/06/23 1728   Admin Instructions:   Take with food.     0915-Given          0934-Given             sodium chloride 0.9 % flush 10 mL  Dose: 10 mL  Freq: As Needed Route: IV  PRN Reason: Line Care  Start: 05/04/23 2332   End: 05/06/23 1728          sodium chloride 0.9 % flush 10 mL  Dose: 10 mL  Freq: Every 12 Hours Scheduled Route: IV  Start: 05/04/23 2337   End: 05/06/23 1728     0244-Given     0915-Given     2034-Given        0934-Given             sodium chloride 0.9 % infusion 40 mL  Dose: 40 mL  Freq: As Needed Route: IV  PRN Reason: Line Care  Start: 05/04/23 2332   End: 05/06/23 1728   Admin Instructions:   Following administration of an IV intermittent medication, flush line with 40mL NS at 100mL/hr.          thiamine (B-1) injection 200 mg  Dose: 200 mg  Freq: Every 8 Hours Scheduled Route: IV  Start: 05/05/23 0600   End: 05/06/23 1728   Admin Instructions:   Doses of up to 250mg, give over 1-2 minutes (IV push). Doses 250mg and above, give over 30 minutes.     0700-Given     1656-Given     2221-Given        0644-Given     1400           Followed by  thiamine (VITAMIN B-1) tablet 100 mg  Dose: 100 mg  Freq: Daily Route: PO  Start: 05/10/23 0900   End:  "05/06/23 1728          tiotropium (SPIRIVA RESPIMAT) 2.5 mcg/act aerosol solution inhaler  Dose: 2 puff  Freq: Daily - RT Route: IN  Start: 05/05/23 0930   End: 05/06/23 1728   Admin Instructions:   Include Respiratory Treatment Education     0729-Given          (0752)-Not Given                           Physician Progress Notes (all)      Miguelangel Lowery MD at 05/06/23 0924                                                        LOS: 2 days   Patient Care Team:  Nakul Paul MD as PCP - General (Family Medicine)    Chief Complaint:  F/up     Subjective   Interval History  I reviewed the admission note, progress notes, PMH, PSH, Family hx, social history, imagings and prior records on this admission, summarized the finding in my note and formulated a transition of care plan.    He feels better.  He denies shortness of breath and cough.  We will [place].    REVIEW OF SYSTEMS:   CARDIOVASCULAR: No chest pain, chest pressure or chest discomfort. No palpitations or edema.    GASTROINTESTINAL: No anorexia, nausea, vomiting or diarrhea. No abdominal pain.  CONSTITUTIONAL: No fever or chills.     Ventilator/Non-Invasive Ventilation Settings (From admission, onward)    None                Physical Exam:     Vital Signs  Temp:  [97.9 °F (36.6 °C)-98.4 °F (36.9 °C)] 98.4 °F (36.9 °C)  Heart Rate:  [] 104  Resp:  [16-20] 20  BP: (109-130)/(78-89) 109/89    Intake/Output Summary (Last 24 hours) at 5/6/2023 0924  Last data filed at 5/6/2023 0644  Gross per 24 hour   Intake 740 ml   Output 400 ml   Net 340 ml     Flowsheet Rows    Flowsheet Row First Filed Value   Admission Height 177.8 cm (70\") Documented at 05/04/2023 2117   Admission Weight 64.4 kg (142 lb) Documented at 05/04/2023 2117          PPE used per hospital policy    General Appearance:   Alert, cooperative, in no acute distress   ENMT:  Mallampati score 3, moist mucous membrane   Eyes:  Pupils equal and reactive to light. EOMI   Neck:    Trachea midline. No " thyromegaly.   Lungs:    Equal but slightly diminished air entry overall.  Very faint expiratory wheezing.    Heart:   Regular rhythm and normal rate, normal S1 and S2, no         murmur   Skin:   No rash or ecchymosis   Abdomen:    Soft. No tenderness. No HSM.   Neuro/psych:  Conscious, alert, oriented x3. Strength 5/5 in upper and lower  ext.  Appropriate mood and affect   Extremities:  No cyanosis, clubbing or edema.  Warm extremities and well-perfused          Results Review:        Results from last 7 days   Lab Units 05/06/23  0737 05/05/23  0640 05/04/23  2159   SODIUM mmol/L 135* 133* 140   POTASSIUM mmol/L 4.2 4.5 4.2   CHLORIDE mmol/L 101 99 105   CO2 mmol/L 22.7 20.6* 20.7*   BUN mg/dL 22* 15 9   CREATININE mg/dL 1.13 1.04 1.20   GLUCOSE mg/dL 86 130* 147*   CALCIUM mg/dL 9.7 9.8 10.6*         Results from last 7 days   Lab Units 05/06/23  0737 05/05/23  0640 05/04/23  2159   WBC 10*3/mm3 13.65* 8.49 8.51   HEMOGLOBIN g/dL 16.5 15.9 17.1   HEMATOCRIT % 47.7 47.3 49.8   PLATELETS 10*3/mm3 265 257 248               Results from last 7 days   Lab Units 05/04/23  2159   D DIMER QUANT MCGFEU/mL <0.27                     I reviewed the patient's new clinical results.        Medication Review:   amLODIPine, 10 mg, Oral, Daily  budesonide-formoterol, 2 puff, Inhalation, BID - RT  cetirizine, 10 mg, Oral, Daily  fluticasone, 2 spray, Nasal, Daily  folic acid, 1 mg, Oral, Daily  guaiFENesin, 600 mg, Oral, Q12H  insulin lispro, 0-7 Units, Subcutaneous, TID With Meals  ipratropium-albuterol, 3 mL, Nebulization, 4x Daily - RT  montelukast, 10 mg, Oral, Nightly  multivitamin with minerals, 1 tablet, Oral, Daily  predniSONE, 40 mg, Oral, Daily With Breakfast  sodium chloride, 10 mL, Intravenous, Q12H  thiamine (B-1) IV, 200 mg, Intravenous, Q8H   Followed by  [START ON 5/10/2023] thiamine, 100 mg, Oral, Daily  tiotropium bromide monohydrate, 2 puff, Inhalation, Daily - RT             Diagnostic imaging:  I personally  and independently reviewed the following images:  CXR 5/4/2023: Flattened diaphragms.  Hyperinflation.    Assessment   1. Severe asthma (FEV1 46% on 10/17/2022) with current exacerbation.  No emphysema on CT chest 9/7/2021  2. Acute hypoxic respiratory failure, secondary to above.  Resolved.  3. Prior eosinophilia: October and July 2022  4. Alcohol and marijuana abuse    All problems new to me    Plan     · Prednisone 40 mg daily, can continue to a total of 7 days steroid therapy  · Singulair  · DuoNeb 4 times a day and Symbicort while hospitalized.  On DC he will require long-acting treatment such as Symbicort, Dulera or Trelegy.  It does not look like he has any.  · Counseled against smoking.    Okay to DC.  Follow-up with his outpatient pulmonologist at [place].        Miguelangel Lowery MD  05/06/23  09:24 EDT            This note was dictated utilizing Dragon dictation    Electronically signed by Miguelangel Lowery MD at 05/06/23 1037          Consult Notes (all)      Cristian Andrea MD at 05/05/23 0909      Consult Orders    1. Inpatient Pulmonology Consult [[id number]] ordered by Bee Butts APRN at 05/04/23 6562                     Patient Identification:  Craig Bolanos  37 y.o.  male  1985  [id number]          LOS 1    Requesting physician: Dr Masters    Reason for Consultation: Asthma exacerbation    History of Present Illness:     37-year-old male with a history of obstructive lung disease presents with shortness of breath with associated wheezing that started earlier in the day of admission.  Has been using his rescue inhaler and nebulized bronchodilators more frequently without improvement.  Recent admission at [facility] for asthma exacerbation noted to have oxygen saturations in the upper 70s on room air on presentation to the emergency room here.  Patient uses alcohol and marijuana daily.  Complained of shortness of breath with wheezing, chest tightness and cough.  Previous smoker of  cigarettes of 1 pack/day and quit in .        Past Medical History:  Past Medical History:   Diagnosis Date   • Alcohol abuse    • Asthma    • COPD (chronic obstructive pulmonary disease)    • Hypertension    • Migraine        Past Surgical History:  History reviewed. No pertinent surgical history.     Home Meds:  Medications Prior to Admission   Medication Sig Dispense Refill Last Dose   • fluticasone (FLONASE) 50 MCG/ACT nasal spray 2 sprays into the nostril(s) as directed by provider Daily.   2023   • Fluticasone-Umeclidin-Vilant (Trelegy Ellipta) 200-62.5-25 MCG/ACT aerosol powder  Inhale.   2023   • loratadine (Claritin) 10 MG tablet Take 1 tablet by mouth Daily.   2023   • mometasone-formoterol (DULERA 100) 100-5 MCG/ACT inhaler Inhale 2 puffs 2 (Two) Times a Day.   2023   • montelukast (SINGULAIR) 10 MG tablet Take 1 tablet by mouth Every Night. 30 tablet 0 2023   • albuterol (PROVENTIL) (2.5 MG/3ML) 0.083% nebulizer solution Take 2.5 mg by nebulization Every 4 (Four) Hours As Needed for Wheezing. 120 each 0    • amLODIPine (NORVASC) 10 MG tablet Take 1 tablet by mouth Daily.   Unknown   • budesonide-formoterol (SYMBICORT) 160-4.5 MCG/ACT inhaler Inhale 2 puffs 2 (Two) Times a Day. 1 each 0          Allergies:  No Known Allergies    Social History:   Social History     Socioeconomic History   • Marital status: Single   Tobacco Use   • Smoking status: Former     Packs/day: 1.00     Years: 10.00     Pack years: 10.00     Types: Cigarettes     Quit date: 2013     Years since quittin.9   • Smokeless tobacco: Never   Vaping Use   • Vaping Use: Never used   Substance and Sexual Activity   • Alcohol use: Yes     Comment: Occasional   • Drug use: Yes     Types: Marijuana     Comment: Daily   • Sexual activity: Defer       Family History:  Family History   Problem Relation Age of Onset   • Diabetes Mother        Review of Systems:  Denies fevers or chills  Denies nausea or vomiting  No  "new vision or hearing changes  No chest pain  Cough with shortness of breath and wheezing  No diarrhea, hematemesis or hematochezia, no dysuria or frequency  No musculoskeletal complaints  No heat or cold intolerance  No skin rashes  No dizziness or confusion.  No seizure activity  No new anxiety or depression  12 system review of systems performed and all else negative    Objective:    PHYSICAL EXAM:    /91 (BP Location: Left arm, Patient Position: Lying)   Pulse 92   Temp 97.8 °F (36.6 °C) (Oral)   Resp 16   Ht 177.8 cm (70\")   Wt 65.3 kg (144 lb)   SpO2 94%   BMI 20.66 kg/m²  Body mass index is 20.66 kg/m². 94% 65.3 kg (144 lb)    GENERAL APPEARANCE:   · Well developed  · Well nourished  · No acute distress   EYES:    · PERRL                                                                           · Conjunctivae normal  · Sclerae nonicteric.  HENT:   · Atraumatic, normocephalic  · External ears and nose normal  · Moist mucous membranes and no ulcers  NECK:  · Thyroid not enlarged  · Trachea midline   RESPIRATORY:    · Nonlabored breathing   · Diminished bilateral breath sounds  · No rales. No wheezing  · No dullness  CARDIOVASCULAR:    · RRR  · Normal S1, S2  · No murmur  · Lower extremity edema: none    GI:   · Bowel sounds normal  · Abdomen soft , nondistended, nontender  · No abdominal masses  MUSCULOSKELETAL:  · Normal movement of extremities  · No tenderness, no deformities  · No clubbing or cyanosis   Skin:    · No visible rashes  · No palpable nodules  · Cap refill normal.  No mottling.   PSYCHIATRIC:  · Speech and behavior appropriate  · Normal mood and affect  · Oriented to person, place and time  NEUROLOGIC:  Cranial nerves II through XII grossly intact.  Sensation intact.      Lab Review:   Results from last 7 days   Lab Units 05/05/23  0640 05/04/23  2159   WBC 10*3/mm3 8.49 8.51   HEMOGLOBIN g/dL 15.9 17.1   HEMATOCRIT % 47.3 49.8   PLATELETS 10*3/mm3 257 248     Results from last 7 " days   Lab Units 05/05/23  0640 05/04/23  2159   SODIUM mmol/L 133* 140   POTASSIUM mmol/L 4.5 4.2   CHLORIDE mmol/L 99 105   CO2 mmol/L 20.6* 20.7*   BUN mg/dL 15 9   CREATININE mg/dL 1.04 1.20   CALCIUM mg/dL 9.8 10.6*   GLUCOSE mg/dL 130* 147*                       Imaging reviewed  chest X-ray 5/4 reviewed shows no acute cardiopulmonary process       Assessment:  Acute respiratory failure with hypoxemia present on admission  Severe persistent asthma with exacerbation  Chronic substance abuse          Recommendations:  Treat with scheduled and as needed bronchodilators with Symbicort and Spiriva in addition to scheduled nebulized bronchodilators and steroid taper.  Patient needs to avoid all smoking.  Wean oxygen as able.          Cristian Andrea MD  [facility], [facility]  Pulmonary and Critical Care Medicine    5/5/2023  09:10 EDT    Electronically signed by Cristian Andrea MD at 05/05/23 0917          Discharge Summary      Reginald Masters MD at 05/06/23 1508                              [facility]    Date of Discharge:  5/6/2023    PCP: Nakul Paul MD    Discharge Diagnosis:   Active Hospital Problems    Diagnosis  POA   • **Acute on chronic respiratory failure with hypoxia [J96.21]  Yes   • Substance abuse [F19.10]  Yes   • Alcohol abuse [F10.10]  Yes   • COPD (chronic obstructive pulmonary disease) (HCC) [J44.9]  Yes   • Severe persistent asthma with status asthmaticus [J45.52]  Yes      Resolved Hospital Problems   No resolved problems to display.          Consults     Date and Time Order Name Status Description    5/5/2023 12:34 AM Inpatient Pulmonology Consult Completed         Hospital Course  This is a 37-year-old male, with history of asthma, COPD, substance abuse, presented to the hospital, with acute on chronic respiratory failure with hypoxia.  Patient is on supplemental oxygen.  Pulmonary consultation has been requested.  Patient has severe  persistent asthma, chronic since childhood, poorly controlled.  Continue breathing treatments.  Patient has continued to improve, he appears to be at baseline clinical status.  Overall patient is deemed stable to discharge.  I have seen and examined patient at bedside, total time spent on discharge management is more than 30 minutes.  Plan of care discussed with patient and he verbalized understanding.      Temp:  [97.9 °F (36.6 °C)-98.4 °F (36.9 °C)] 98.4 °F (36.9 °C)  Heart Rate:  [] 99  Resp:  [16-20] 20  BP: (109-131)/(78-89) 131/82  Body mass index is 19.37 kg/m².    Physical Exam   General, awake and alert.  Head and ENT, normocephalic and atraumatic.  Lungs, symmetric expansion, equal air entry bilaterally.  Heart, regular rate and rhythm.  Abdomen, soft and nontender.  Extremities, no clubbing or cyanosis.  Neuro, no focal deficits.  Skin: Warm and no rash.  Psych, normal mood and affect.  Musculoskeletal, joint examination is grossly normal.    Disposition: Home or Self Care       Discharge Medications      New Medications      Instructions Start Date   predniSONE 20 MG tablet  Commonly known as: DELTASONE  Notes to patient: Next dose due 5/7/23 at 9am   20 mg, Oral, Daily         Continue These Medications      Instructions Start Date   albuterol (2.5 MG/3ML) 0.083% nebulizer solution  Commonly known as: PROVENTIL   2.5 mg, Nebulization, Every 4 Hours PRN      amLODIPine 10 MG tablet  Commonly known as: NORVASC  Notes to patient: Next dose due 5/7/23 at 9am   10 mg, Oral, Daily      budesonide-formoterol 160-4.5 MCG/ACT inhaler  Commonly known as: SYMBICORT  Notes to patient: Next dose due 5/6/23 at 9pm   2 puffs, Inhalation, 2 Times Daily - RT      Claritin 10 MG tablet  Generic drug: loratadine  Notes to patient: Next dose due 5/7/23 at 9am   10 mg, Oral, Daily      fluticasone 50 MCG/ACT nasal spray  Commonly known as: FLONASE  Notes to patient: Continue taking as prescribed   2 sprays, Nasal,  Daily      mometasone-formoterol 100-5 MCG/ACT inhaler  Commonly known as: DULERA 100  Notes to patient: Next dose due 5/6/23 at 9pm   2 puffs, Inhalation, 2 Times Daily - RT      montelukast 10 MG tablet  Commonly known as: Singulair  Notes to patient: Next dose due 5/6/23 at 9pm   10 mg, Oral, Nightly      Trelegy Ellipta 200-62.5-25 MCG/ACT aerosol powder   Generic drug: Fluticasone-Umeclidin-Vilant  Notes to patient: Continue taking as prescribed   Inhalation              Additional Instructions for the Follow-ups that You Need to Schedule     Discharge Follow-up with PCP   As directed       Currently Documented PCP:    Nakul Paul MD    PCP Phone Number:    [phone]     Follow Up Details: Follow up with PCP in 7 days.            Follow-up Information     Nakul Paul MD .    Specialty: Family Medicine  Why: Follow up with PCP in 7 days.  Contact information:  [address]  [facility] [id number] [phone]             Sabrina Cordero MD. Schedule an appointment as soon as possible for a visit.    Specialty: Sleep Medicine  Why: 1-2 weeks  Contact information:  [address] [facility]  [address]  [facility] [id number] [phone]                      No future appointments.     Reginald Masters MD  [facility]  05/06/23    Discharge time spent greater than 30 minutes.    Electronically signed by Reginald Masters MD at 05/06/23 3285

## 2023-05-08 NOTE — CASE MANAGEMENT/SOCIAL WORK
Case Management Discharge Note      Final Note: DC'd home 5/6              Transportation Services  Private: Car    Final Discharge Disposition Code: 01 - home or self-care

## 2025-08-20 ENCOUNTER — HOSPITAL ENCOUNTER (EMERGENCY)
Facility: HOSPITAL | Age: 40
Discharge: HOME | End: 2025-08-20
Attending: EMERGENCY MEDICINE
Payer: COMMERCIAL